# Patient Record
Sex: FEMALE | Race: WHITE | NOT HISPANIC OR LATINO | Employment: UNEMPLOYED | URBAN - METROPOLITAN AREA
[De-identification: names, ages, dates, MRNs, and addresses within clinical notes are randomized per-mention and may not be internally consistent; named-entity substitution may affect disease eponyms.]

---

## 2019-06-05 ENCOUNTER — OFFICE VISIT (OUTPATIENT)
Dept: FAMILY MEDICINE | Facility: CLINIC | Age: 24
End: 2019-06-05
Payer: COMMERCIAL

## 2019-06-05 VITALS
TEMPERATURE: 98.4 F | HEIGHT: 67 IN | DIASTOLIC BLOOD PRESSURE: 76 MMHG | SYSTOLIC BLOOD PRESSURE: 126 MMHG | BODY MASS INDEX: 26.18 KG/M2 | RESPIRATION RATE: 16 BRPM | WEIGHT: 166.8 LBS | HEART RATE: 76 BPM | OXYGEN SATURATION: 100 %

## 2019-06-05 DIAGNOSIS — Z11.1 SCREENING FOR TUBERCULOSIS: ICD-10-CM

## 2019-06-05 DIAGNOSIS — Z12.4 SCREENING FOR CERVICAL CANCER: ICD-10-CM

## 2019-06-05 DIAGNOSIS — Z72.0 TOBACCO ABUSE: ICD-10-CM

## 2019-06-05 DIAGNOSIS — Z11.3 SCREEN FOR STD (SEXUALLY TRANSMITTED DISEASE): ICD-10-CM

## 2019-06-05 DIAGNOSIS — Z11.59 ENCOUNTER FOR SCREENING FOR OTHER VIRAL DISEASES: ICD-10-CM

## 2019-06-05 DIAGNOSIS — Z00.00 ROUTINE GENERAL MEDICAL EXAMINATION AT A HEALTH CARE FACILITY: Primary | ICD-10-CM

## 2019-06-05 DIAGNOSIS — A54.9 GONORRHEA: ICD-10-CM

## 2019-06-05 DIAGNOSIS — F31.9 BIPOLAR DEPRESSION (H): ICD-10-CM

## 2019-06-05 DIAGNOSIS — A74.9 CHLAMYDIA: ICD-10-CM

## 2019-06-05 PROCEDURE — 87389 HIV-1 AG W/HIV-1&-2 AB AG IA: CPT | Performed by: FAMILY MEDICINE

## 2019-06-05 PROCEDURE — 86803 HEPATITIS C AB TEST: CPT | Performed by: FAMILY MEDICINE

## 2019-06-05 PROCEDURE — 87591 N.GONORRHOEAE DNA AMP PROB: CPT | Performed by: FAMILY MEDICINE

## 2019-06-05 PROCEDURE — 87491 CHLMYD TRACH DNA AMP PROBE: CPT | Performed by: FAMILY MEDICINE

## 2019-06-05 PROCEDURE — 86708 HEPATITIS A ANTIBODY: CPT | Performed by: FAMILY MEDICINE

## 2019-06-05 PROCEDURE — 86780 TREPONEMA PALLIDUM: CPT | Performed by: FAMILY MEDICINE

## 2019-06-05 PROCEDURE — 99385 PREV VISIT NEW AGE 18-39: CPT | Performed by: FAMILY MEDICINE

## 2019-06-05 PROCEDURE — 36415 COLL VENOUS BLD VENIPUNCTURE: CPT | Performed by: FAMILY MEDICINE

## 2019-06-05 PROCEDURE — 86481 TB AG RESPONSE T-CELL SUSP: CPT | Performed by: FAMILY MEDICINE

## 2019-06-05 PROCEDURE — 86709 HEPATITIS A IGM ANTIBODY: CPT | Performed by: FAMILY MEDICINE

## 2019-06-05 PROCEDURE — 87340 HEPATITIS B SURFACE AG IA: CPT | Performed by: FAMILY MEDICINE

## 2019-06-05 RX ORDER — BREXPIPRAZOLE 1 MG/1
TABLET ORAL
Refills: 1 | COMMUNITY
Start: 2019-04-24 | End: 2021-01-18

## 2019-06-05 RX ORDER — NICOTINE 21 MG/24HR
1 PATCH, TRANSDERMAL 24 HOURS TRANSDERMAL EVERY 24 HOURS
Qty: 30 PATCH | Refills: 1 | Status: SHIPPED | OUTPATIENT
Start: 2019-06-05 | End: 2021-01-18

## 2019-06-05 ASSESSMENT — PATIENT HEALTH QUESTIONNAIRE - PHQ9
5. POOR APPETITE OR OVEREATING: MORE THAN HALF THE DAYS
SUM OF ALL RESPONSES TO PHQ QUESTIONS 1-9: 8

## 2019-06-05 ASSESSMENT — ANXIETY QUESTIONNAIRES
GAD7 TOTAL SCORE: 13
6. BECOMING EASILY ANNOYED OR IRRITABLE: MORE THAN HALF THE DAYS
7. FEELING AFRAID AS IF SOMETHING AWFUL MIGHT HAPPEN: SEVERAL DAYS
3. WORRYING TOO MUCH ABOUT DIFFERENT THINGS: MORE THAN HALF THE DAYS
IF YOU CHECKED OFF ANY PROBLEMS ON THIS QUESTIONNAIRE, HOW DIFFICULT HAVE THESE PROBLEMS MADE IT FOR YOU TO DO YOUR WORK, TAKE CARE OF THINGS AT HOME, OR GET ALONG WITH OTHER PEOPLE: VERY DIFFICULT
1. FEELING NERVOUS, ANXIOUS, OR ON EDGE: MORE THAN HALF THE DAYS
2. NOT BEING ABLE TO STOP OR CONTROL WORRYING: SEVERAL DAYS
5. BEING SO RESTLESS THAT IT IS HARD TO SIT STILL: NEARLY EVERY DAY

## 2019-06-05 ASSESSMENT — MIFFLIN-ST. JEOR: SCORE: 1529.23

## 2019-06-05 NOTE — NURSING NOTE
"Chief Complaint   Patient presents with     Physical     [facility] pt- pt states that she did have a pap smear done elsewhere, but she does want STD screenings and a pap anyways.     /76   Pulse 76   Temp 98.4  F (36.9  C) (Oral)   Resp 16   Ht 1.702 m (5' 7\")   Wt 75.7 kg (166 lb 12.8 oz)   SpO2 100%   BMI 26.12 kg/m   Estimated body mass index is 26.12 kg/m  as calculated from the following:    Height as of this encounter: 1.702 m (5' 7\").    Weight as of this encounter: 75.7 kg (166 lb 12.8 oz).  Medication Reconciliation: complete        Health Maintenance Due Pending Provider Review:  Pap Smear, PHQ9 and GAD7    Possibly completing today per provider review.    Cary Sosa MA  [facility]  [phone]  "

## 2019-06-05 NOTE — PATIENT INSTRUCTIONS
Close follow up with psychiatrist  Follow up at [facility] as needed    Preventive Health Recommendations  Female Ages 26 - 39  Yearly exam:   See your health care provider every year in order to    Review health changes.     Discuss preventive care.      Review your medicines if you your doctor has prescribed any.    Until age 30: Get a Pap test every three years (more often if you have had an abnormal result).    After age 30: Talk to your doctor about whether you should have a Pap test every 3 years or have a Pap test with HPV screening every 5 years.   You do not need a Pap test if your uterus was removed (hysterectomy) and you have not had cancer.  You should be tested each year for STDs (sexually transmitted diseases), if you're at risk.   Talk to your provider about how often to have your cholesterol checked.  If you are at risk for diabetes, you should have a diabetes test (fasting glucose).  Shots: Get a flu shot each year. Get a tetanus shot every 10 years.   Nutrition:     Eat at least 5 servings of fruits and vegetables each day.    Eat whole-grain bread, whole-wheat pasta and brown rice instead of white grains and rice.    Get adequate Calcium and Vitamin D.     Lifestyle    Exercise at least 150 minutes a week (30 minutes a day, 5 days of the week). This will help you control your weight and prevent disease.    Limit alcohol to one drink per day.    No smoking.     Wear sunscreen to prevent skin cancer.    See your dentist every six months for an exam and cleaning.
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
## 2019-06-06 ENCOUNTER — TELEPHONE (OUTPATIENT)
Dept: FAMILY MEDICINE | Facility: CLINIC | Age: 24
End: 2019-06-06

## 2019-06-06 DIAGNOSIS — A74.9 CHLAMYDIA: Primary | ICD-10-CM

## 2019-06-06 DIAGNOSIS — A54.9 GONORRHEA: ICD-10-CM

## 2019-06-06 LAB
C TRACH DNA SPEC QL NAA+PROBE: POSITIVE
HAV IGG SER QL IA: NONREACTIVE
HAV IGM SERPL QL IA: NONREACTIVE
HBV SURFACE AG SERPL QL IA: NONREACTIVE
HCV AB SERPL QL IA: NONREACTIVE
HIV 1+2 AB+HIV1 P24 AG SERPL QL IA: NONREACTIVE
N GONORRHOEA DNA SPEC QL NAA+PROBE: POSITIVE
SPECIMEN SOURCE: ABNORMAL
SPECIMEN SOURCE: ABNORMAL
T PALLIDUM AB SER QL: NONREACTIVE

## 2019-06-06 RX ORDER — AZITHROMYCIN 500 MG/1
2000 TABLET, FILM COATED ORAL ONCE
Qty: 4 TABLET | Refills: 0 | Status: SHIPPED | OUTPATIENT
Start: 2019-06-06 | End: 2019-06-06

## 2019-06-06 RX ORDER — AZITHROMYCIN 500 MG/1
1000 TABLET, FILM COATED ORAL ONCE
Qty: 2 TABLET | Refills: 0 | Status: SHIPPED | OUTPATIENT
Start: 2019-06-06 | End: 2019-06-06

## 2019-06-06 ASSESSMENT — ANXIETY QUESTIONNAIRES: GAD7 TOTAL SCORE: 13

## 2019-06-06 NOTE — TELEPHONE ENCOUNTER
Please call radha at [phone] & fax report to [phone] at [facility]-  to inform patient Alicia     Chlamydia & gonorrhea positive  Need treatment Zithromax 2 gm single dose treatment is sent  Should have nurse only ap to get ceftriaxone 250 mg 1m       All rest negative for -HEPATITIS B, B  C VIRUS   HIV, human immunodeficiency virus  and syphilis    Negative for TB screening as well.    Also start MD

## 2019-06-06 NOTE — TELEPHONE ENCOUNTER
Called [facility] and left message for pt to call us back   Need to discuss positive results with patient     Rx sent for 2g - treatment should be 1g only   Huddled with A.S  She reviewed this - 1g is correct  Cancelled Rx at [facility] Rx for 1g instead per verbal order from A.S    [facility] form started (treatment date needs to be added)  Bettye FARRELL RN

## 2019-06-07 LAB
GAMMA INTERFERON BACKGROUND BLD IA-ACNC: 0.03 IU/ML
M TB IFN-G BLD-IMP: NEGATIVE
M TB IFN-G CD4+ BCKGRND COR BLD-ACNC: >10 IU/ML
MITOGEN IGNF BCKGRD COR BLD-ACNC: 0.01 IU/ML
MITOGEN IGNF BCKGRD COR BLD-ACNC: 0.03 IU/ML

## 2019-06-10 ENCOUNTER — ALLIED HEALTH/NURSE VISIT (OUTPATIENT)
Dept: NURSING | Facility: CLINIC | Age: 24
End: 2019-06-10
Payer: COMMERCIAL

## 2019-06-10 ENCOUNTER — MYC MEDICAL ADVICE (OUTPATIENT)
Dept: FAMILY MEDICINE | Facility: CLINIC | Age: 24
End: 2019-06-10

## 2019-06-10 DIAGNOSIS — A54.9 GONORRHEA: Primary | ICD-10-CM

## 2019-06-10 PROCEDURE — 96372 THER/PROPH/DIAG INJ SC/IM: CPT

## 2019-06-10 PROCEDURE — 99207 ZZC NO CHARGE NURSE ONLY: CPT

## 2019-06-10 RX ORDER — CEFTRIAXONE SODIUM 250 MG
250 VIAL (EA) INJECTION ONCE
Status: COMPLETED | OUTPATIENT
Start: 2019-06-10 | End: 2019-06-10

## 2019-06-10 RX ADMIN — Medication 250 MG: at 16:00

## 2019-08-06 ENCOUNTER — TELEPHONE (OUTPATIENT)
Dept: FAMILY MEDICINE | Facility: CLINIC | Age: 24
End: 2019-08-06

## 2019-08-06 NOTE — TELEPHONE ENCOUNTER
Received form(s) from [facility] for patient investigation.  Placed form(s) in/on AS's desk.  Forms need to be signed and faxed to [phone]..    Call pt to verify form was sent: No  Copy needs to be sent for scanning after completion: Yes

## 2020-03-01 ENCOUNTER — HEALTH MAINTENANCE LETTER (OUTPATIENT)
Age: 25
End: 2020-03-01

## 2020-11-08 ENCOUNTER — HOSPITAL ENCOUNTER (EMERGENCY)
Facility: CLINIC | Age: 25
Discharge: HOME | End: 2020-11-08
Attending: EMERGENCY MEDICINE | Admitting: EMERGENCY MEDICINE
Payer: COMMERCIAL

## 2020-11-08 VITALS
RESPIRATION RATE: 20 BRPM | OXYGEN SATURATION: 99 % | DIASTOLIC BLOOD PRESSURE: 69 MMHG | SYSTOLIC BLOOD PRESSURE: 145 MMHG | TEMPERATURE: 97.8 F | HEART RATE: 118 BPM

## 2020-11-08 DIAGNOSIS — R45.1 AGITATION: ICD-10-CM

## 2020-11-08 DIAGNOSIS — F19.10 POLYSUBSTANCE ABUSE (H): ICD-10-CM

## 2020-11-08 LAB — ALCOHOL BREATH TEST: 0 (ref 0–0.01)

## 2020-11-08 PROCEDURE — 99283 EMERGENCY DEPT VISIT LOW MDM: CPT | Performed by: EMERGENCY MEDICINE

## 2020-11-08 PROCEDURE — 82075 ASSAY OF BREATH ETHANOL: CPT | Performed by: EMERGENCY MEDICINE

## 2020-11-08 ASSESSMENT — ENCOUNTER SYMPTOMS
RHINORRHEA: 0
SORE THROAT: 0
FEVER: 0
NAUSEA: 0
CHILLS: 0
VOMITING: 0
ABDOMINAL PAIN: 0
HALLUCINATIONS: 0
COUGH: 0
NERVOUS/ANXIOUS: 1
SHORTNESS OF BREATH: 0

## 2020-11-08 NOTE — ED AVS SNAPSHOT
[facility] Emergency Department  [address]  [place] [address]  Phone: [phone]  Fax: [phone]                                    Alicia Rangel   MRN: [id number]    Department: [facility] Emergency Department   Date of Visit: 11/8/2020           After Visit Summary Signature Page    I have received my discharge instructions, and my questions have been answered. I have discussed any challenges I see with this plan with the nurse or doctor.    ..........................................................................................................................................  Patient/Patient Representative Signature      ..........................................................................................................................................  Patient Representative Print Name and Relationship to Patient    ..................................................               ................................................  Date                                   Time    ..........................................................................................................................................  Reviewed by Signature/Title    ...................................................              ..............................................  Date                                               Time          22EPIC Rev 08/18

## 2020-11-09 ENCOUNTER — TRANSFERRED RECORDS (OUTPATIENT)
Dept: HEALTH INFORMATION MANAGEMENT | Facility: CLINIC | Age: 25
End: 2020-11-09

## 2020-11-09 ENCOUNTER — HOSPITAL ENCOUNTER (EMERGENCY)
Facility: CLINIC | Age: 25
Discharge: HOME | End: 2020-11-10
Attending: EMERGENCY MEDICINE | Admitting: EMERGENCY MEDICINE
Payer: COMMERCIAL

## 2020-11-09 DIAGNOSIS — F19.10 SUBSTANCE ABUSE (H): ICD-10-CM

## 2020-11-09 DIAGNOSIS — F11.10 HEROIN ABUSE (H): ICD-10-CM

## 2020-11-09 PROCEDURE — 99284 EMERGENCY DEPT VISIT MOD MDM: CPT | Performed by: EMERGENCY MEDICINE

## 2020-11-09 ASSESSMENT — ENCOUNTER SYMPTOMS
AGITATION: 1
DYSURIA: 0
HYPERACTIVE: 1

## 2020-11-09 NOTE — DISCHARGE INSTRUCTIONS
You have come to the emergency department in order to get into detox due to heroin, methamphetamines, and possible other opiate use.  We do not do detox from opiates on an inpatient basis.  There is an outpatient addiction clinic that can talk with you about opiate detox.   There are no units that do detox from methamphetamines - there are treatment centers that can help you with this.      Rule 25 Information -     To access chemical dependency treatment you must have an assessment done or updated within 30 days of starting any program. If you are intoxicated to may be required to detox at a detox facility before starting treatment.     [facility]: [phone]  [facility]: [phone]  [facility]: [phone]  [facility]: [phone]   [facility]: [phone]    The following facilities offer Rule 25 assessments -     [facility]  [phone]  [facility]   [address] [place] [address]    [facility] ([facility])  [phone]  [address] [place] [address]  *Walk in assessments also available M-F starting at 8 am.     [facility]  [phone]  [address] [facility] [address]      [facility]   [phone]  [facility]  [address] [facility] [address]    [facility] [phone]  [address] [facility] [address]    [facility]  [phone] - press 1  Multiple clinic locations    [facility]   [phone]  [address]  [facility] [address]    [facility]  [phone]  [address] [facility] [address]    [facility]  [phone]  [address] [facility] [address]    [facility] RULE 25 INFORMATION -   [place] - [phone]  [facility] [phone]  [place] - [phone]  [facility] [phone]  [facility] [phone]  [facility]  [phone]  [place] - [phone]  [place] - [phone]

## 2020-11-09 NOTE — ED PROVIDER NOTES
"    [facility]GENCY DEPARTMENT ([facility])     November 8, 2020  History     Chief Complaint   Patient presents with     Drug / Alcohol Assessment     HPI  Alicia Rangel is a 25 year old female with a PMH of bipolar depression, AGUSTIN, asthma, jackie, paranoia, psychosis, and polysubstance abuse who presents to the ED today seeking a drug/alcohol assessment.  Patient reports she has been using meth and heroin, with her last use being earlier today.  Patient wants to get into detox.  Patient reports she smokes \"a bowl\" of meth daily, and has been doing this for a year and a half.  She reports she has been smoking $10 worth of heroin daily since November 2018, with her last use being just before arrival.  Patient reports she used carfentanyl on Sunday morning, and her friends had to administer Narcan.  She states that she was breathing the whole time, and did not go to the hospital after.  She states she smokes marijuana occasionally, with her last use a couple weeks ago.  Patient reports that she is on lamotrigine for her mental health disorders.  Patient reports has been through treatment before, [place] year ago, then [facility] in February 2020.  Patient reports she was not sober at all after her last treatment.  Patient reports she was last sober 3 years ago for a short time.  Patient denies any alcohol use.  Patient reports she has a psychiatrist at [facility] in [place], and occasionally attends [facility].  Patient reports she is paranoid and states \"for good reason\" but will not state why.  Per mom, her father and mother support her and want her to get help.  Patient denies alcohol use, suicidal ideation, hallucinations, fever, chills, rhinorrhea, sore throat, cough, shortness of breath, chest pain, abdominal pain, nausea, vomiting, or any IV drug use.    Her mom has accompanied the patient here to the emergency department.  She states that she very much wants to support the " "patient as she attempts to get sober.  She states that this episode last weekend where she seemingly overdosed on car fentanyl was quite a wake-up call to her and this is why she is pressing the patient to get help now.    The patient is exceedingly irritable, and during the entire interview was trying to interact on her phone with her Facebook account.  She had to be told several times to put her phone down so she could participate in the interview.  On multiple occasions, she said \"can't you just look this up, why are you asking me all these questions?\"    I have reviewed the Medications, Allergies, Past Medical and Surgical History, and Social History in the i4[employer]ms system.  PAST MEDICAL HISTORY:   Past Medical History:   Diagnosis Date     Generalized anxiety disorder      Polysubstance dependence (H)        PAST SURGICAL HISTORY:   Past Surgical History:   Procedure Laterality Date     GYN SURGERY         Past medical history, past surgical history, medications, and allergies were reviewed with the patient. Additional pertinent items: None    FAMILY HISTORY:   Family History   Problem Relation Age of Onset     Eye Disorder Mother      Alcohol/Drug Father      Hypertension Father      Cancer Paternal Grandfather        SOCIAL HISTORY:   Social History     Tobacco Use     Smoking status: Current Every Day Smoker     Packs/day: 3.00     Types: Cigarettes     Smokeless tobacco: Never Used   Substance Use Topics     Alcohol use: Yes     Comment: Last used Wednesday     Social history was reviewed with the patient. Additional pertinent items: None             Allergies   Allergen Reactions     Nkda [No Known Drug Allergies]         Review of Systems   Constitutional: Negative for chills and fever.        (Seeking detox from methamphetamines and opiates)   HENT: Negative for rhinorrhea and sore throat.    Respiratory: Negative for cough and shortness of breath.    Cardiovascular: Negative for chest pain. "   Gastrointestinal: Negative for abdominal pain, nausea and vomiting.   Genitourinary: Negative for dysuria.   Psychiatric/Behavioral: Positive for agitation and behavioral problems. Negative for hallucinations and suicidal ideas. The patient is nervous/anxious (Paranoid) and is hyperactive.    All other systems reviewed and are negative.    A complete review of systems was performed with pertinent positives and negatives noted in the HPI, and all other systems negative.    Physical Exam   BP: (!) 145/69  Pulse: 118  Temp: 97.8  F (36.6  C)  Resp: 20  SpO2: 99 %      Physical Exam  Vitals signs and nursing note reviewed.   Constitutional:       General: She is not in acute distress.     Appearance: She is well-developed. She is not diaphoretic.      Comments: Thin adult female, exceedingly distracted by her phone and attempting to interact with her Facebook account, very irritable, clearly not interested in responding to questions regarding drug use   HENT:      Head: Normocephalic and atraumatic.   Eyes:      Conjunctiva/sclera: Conjunctivae normal.      Pupils: Pupils are equal, round, and reactive to light.   Cardiovascular:      Rate and Rhythm: Normal rate and regular rhythm.      Heart sounds: Normal heart sounds.   Pulmonary:      Effort: Pulmonary effort is normal. No respiratory distress.      Breath sounds: Normal breath sounds. No wheezing or rales.   Abdominal:      General: Bowel sounds are normal. There is no distension.      Palpations: Abdomen is soft.      Tenderness: There is no abdominal tenderness.   Skin:     General: Skin is warm and dry.   Neurological:      Mental Status: She is alert.   Psychiatric:      Comments: Distracted, irritable, pressured speech, frequently interrupting.  Denies SI/HI, does not appear to be responding to internal stimuli. Insight very impaired.  Labile mood.  Unpredictable behavior, very suddenly escalated and became violent, agitated, throwing things in the room.  "        ED Course   7:24 PM  The patient was seen and examined by Vane Swain MD in Room ED15.        Procedures                           Results for orders placed or performed during the hospital encounter of 11/08/20 (from the past 24 hour(s))   Alcohol breath test POCT   Result Value Ref Range    Alcohol Breath Test 0.00 0.00 - 0.01     Medications - No data to display          Assessments & Plan (with Medical Decision Making)   Patient presents to the emergency department today seeking detox.  She is currently using methamphetamines and opiates.  She is not drinking alcohol per her report and benzodiazepines do not appear to be something that she tends to use.  She is very obviously exceedingly irritable and not overly interested in cooperating with an assessment.  I do not see any mental health reason to admit her.    I did attempt to explain to her that we cannot admit her to a detox unit for her meth use.  Before I could even finish the sentence, patient very suddenly escalated, stood up from her chair and began screaming out of control.  Her mother, who was with her here in the emergency department, immediately did stand up and tried to reason with her.  She repeatedly attempted to call the patient down and this was unsuccessful.  The patient did make threats and told me to \"get the fuck out of here\" repeatedly.  She was not redirectable even to have a very basic discussion of what our recommendations would be about how to go about getting help as an outpatient.  She began throwing things at her mother.  Even with a show of force by security, she did not calm down.    At this point, she is asking/demanding discharge.  For patient and staff safety, we will discharge her.  I attempted to give CD resources, and I was going to speak with her mother in a separate room so we could discuss potential outpatient resources for her.  However the patient came back in the department, screaming at the top of her " lungs and demanded that her mother come out right now so she could drive her home.  Her mother complied.    Patient was discharged.    I have reviewed the nursing notes.    I have reviewed the findings, diagnosis, plan and need for follow up with the patient.    Discharge Medication List as of 11/8/2020  7:43 PM          Final diagnoses:   Polysubstance abuse (H)   Agitation   I, Zachary Marx, am serving as a trained medical scribe to document services personally performed by Vane Swain MD, based on the provider's statements to me.     I, Vane Swain MD, was physically present and have reviewed and verified the accuracy of this note documented by Zachary Marx.      11/8/2020   [facility] EMERGENCY DEPARTMENT     Vane Swain MD  11/09/20 0105

## 2020-11-09 NOTE — ED TRIAGE NOTES
Pt states she has been using alcohol, meth, and herion and last used earlier today.  Pt wants to get into detox.

## 2020-11-09 NOTE — ED NOTES
Pt yelling and swearing a MD and staff in pt room and in hallway. Pt yelling for shoes and demanding to leave. Pt's mom given Rule 25 and detox center information. Pt left without being discharged.

## 2020-11-09 NOTE — ED AVS SNAPSHOT
[facility] Emergency Department  [address]  [place] [address]  Phone: [phone]  Fax: [phone]                                    Alicia Rangel   MRN: [id number]    Department: [facility]   Date of Visit: 11/9/2020           After Visit Summary Signature Page    I have received my discharge instructions, and my questions have been answered. I have discussed any challenges I see with this plan with the nurse or doctor.    ..........................................................................................................................................  Patient/Patient Representative Signature      ..........................................................................................................................................  Patient Representative Print Name and Relationship to Patient    ..................................................               ................................................  Date                                   Time    ..........................................................................................................................................  Reviewed by Signature/Title    ...................................................              ..............................................  Date                                               Time          22EPIC Rev 08/18

## 2020-11-10 VITALS
TEMPERATURE: 99 F | SYSTOLIC BLOOD PRESSURE: 114 MMHG | OXYGEN SATURATION: 100 % | RESPIRATION RATE: 16 BRPM | DIASTOLIC BLOOD PRESSURE: 78 MMHG | HEART RATE: 54 BPM

## 2020-11-10 PROCEDURE — 250N000011 HC RX IP 250 OP 636: Performed by: EMERGENCY MEDICINE

## 2020-11-10 PROCEDURE — 250N000013 HC RX MED GY IP 250 OP 250 PS 637: Mod: GY | Performed by: EMERGENCY MEDICINE

## 2020-11-10 PROCEDURE — 96372 THER/PROPH/DIAG INJ SC/IM: CPT | Performed by: EMERGENCY MEDICINE

## 2020-11-10 RX ORDER — LAMOTRIGINE 25 MG/1
25 TABLET ORAL ONCE
Status: COMPLETED | OUTPATIENT
Start: 2020-11-10 | End: 2020-11-10

## 2020-11-10 RX ORDER — OLANZAPINE 10 MG/2ML
10 INJECTION, POWDER, FOR SOLUTION INTRAMUSCULAR ONCE
Status: COMPLETED | OUTPATIENT
Start: 2020-11-10 | End: 2020-11-10

## 2020-11-10 RX ORDER — BUPRENORPHINE AND NALOXONE 2; .5 MG/1; MG/1
1 FILM, SOLUBLE BUCCAL; SUBLINGUAL DAILY
Status: DISCONTINUED | OUTPATIENT
Start: 2020-11-10 | End: 2020-11-10 | Stop reason: HOSPADM

## 2020-11-10 RX ORDER — BUPRENORPHINE AND NALOXONE 2; .5 MG/1; MG/1
1 FILM, SOLUBLE BUCCAL; SUBLINGUAL ONCE
Status: DISCONTINUED | OUTPATIENT
Start: 2020-11-10 | End: 2020-11-10 | Stop reason: HOSPADM

## 2020-11-10 RX ADMIN — LAMOTRIGINE 25 MG: 25 TABLET ORAL at 12:57

## 2020-11-10 RX ADMIN — OLANZAPINE 10 MG: 10 INJECTION, POWDER, LYOPHILIZED, FOR SOLUTION INTRAMUSCULAR at 11:16

## 2020-11-10 RX ADMIN — BUPRENORPHINE AND NALOXONE 1 FILM: 2; .5 FILM BUCCAL; SUBLINGUAL at 14:35

## 2020-11-10 ASSESSMENT — ENCOUNTER SYMPTOMS
CONFUSION: 1
ABDOMINAL PAIN: 0
AGITATION: 1
SHORTNESS OF BREATH: 0
FEVER: 0

## 2020-11-10 NOTE — ED NOTES
Pt signed out to me by Dr. Quigley at 7 am      Situation:   The 25-year-old female who was brought to the emergency department overnight due to aggressive behavior.  Patient has a history of polysubstance abuse and was given droperidol in route by EMS.    Plan: Plan is to watch the patient in the ER until she is more awake and alert and able to give a full history.  There is a concern that patient is under the influence of multiple polysubstances this likely caused her aggressive behavior.  Plan will be that patient will likely be discharged in the morning.    Shift Report:    The first reevaluated the patient around 8:30 in the morning.  She is sleeping comfortably in bed and is calm.  Patient will be reevaluated later in the day when she is more awake.    4pm: When patient woke up she became more agitated and was having withdrawal-like symptoms.  She felt very restless and anxious.  She is unable to sit still.  She felt like she needed to be admitted to detox.  I did call and discuss her case with mental health detox.  They said that they no longer do detox for methamphetamines and opioids cannot offer her any inpatient treatment.  Patient was given some Zyprexa to see that would help with her agitation and some Lamictal per her request.  This helped somewhat with her symptoms but patient was still feeling very restless and felt like her symptoms were likely due to heroin withdrawal.  I called and spoke to the [facility] physician on-call.  She recommended that I give her a dose of Suboxone and so she was given one half a fill film of 2 mg of Suboxone.  The Suboxone did help her feel better and her symptoms improved.  The physician had recommended that I discharge her with 1 dose of Suboxone for her to take tonight and then have her follow-up in clinic tomorrow.  Patient was given 1 additional 2 mg of Suboxone here in the ER and will be discharged home with instructions to follow-up tomorrow in [facility] clinic  for further care.    Signed:  Mary Cleveland MD  November 10, 2020 at 4:01 PM       Mary Cleveland MD  11/10/20 2948

## 2020-11-10 NOTE — ED NOTES
Patient up to bathroom at this time. Patient ambulates with stable gait, but needs encouragement to open eyes when ambulating. Patient tearful about having to void. Assisted to bathroom, patient able to void. Patient able to answer some questions, but still not answering Triage questions. Poor insight about the events that happened PTA. Will continue to monitor.

## 2020-11-10 NOTE — ED NOTES
Pt was picked up from [address] because staff couldn't control her.  She got 5mg of Droperidol IV at about 2240.

## 2020-11-10 NOTE — ED NOTES
Patient was brought in by EMS from [address] [place] [address] for aggressive behavior. Patient was given sedatives PTA. Patient answering questions intermittently. Patient sleepy at this time. Rouses to verbal stimuli. Denies being suicidal.

## 2020-11-10 NOTE — ED PROVIDER NOTES
"ED Provider Note  [facility]      History     Chief Complaint   Patient presents with     Aggressive Behavior     Pt was picked up from [address] because staff couldn't control her.  She got 5mg of Droperidol IV at about 2240.      The history is provided by the patient, medical records and the EMS personnel.     Alicia Rangel is a 25 year old female with a PMH of bipolar depression, AGUSTIN, asthma, jackie, paranoia, psychosis, and polysubstance abuse who presents to the ED today via EMS from [address] for aggressive behavior. Per EMS staff could not control her. She was given 5mg Droperidol at 2240. When asked what she used tonight the patient states she used \"catfish\".     Per chart review the patient was seen here in the ED on 11/8 seeking a drug/alcohol assessment and seeking detox. She reported using a bowl of meth daily for 2 years in addition to occasional marijuana use. She also reported using carfentanyl on Sunday morning and her friends had to administer Narcan. She is on lamotrigine for mental health disorders. She was in treatment 1 year ago in [place] and again at [facility] in February 2020, but was not sober at all after. The patient escalated after being told she could not be admitted to detox for meth. She was discharged for patient and staff safety.   Past Medical History:   Diagnosis Date     Generalized anxiety disorder      Polysubstance dependence (H)           Review of Systems   Constitutional: Negative for fever.   Respiratory: Negative for shortness of breath.    Cardiovascular: Negative for chest pain.   Gastrointestinal: Negative for abdominal pain.   Psychiatric/Behavioral: Positive for agitation and confusion. Negative for self-injury and suicidal ideas.   All other systems reviewed and are negative.        Physical Exam   BP: (pt refused)  Pulse: 86  Temp: (pt refused temp)  Resp: 12  SpO2: 98 %  Physical Exam  Vitals signs and nursing note reviewed. "   Constitutional:       General: She is not in acute distress.     Appearance: She is well-developed.   HENT:      Head: Normocephalic.   Eyes:      Extraocular Movements: Extraocular movements intact.   Neck:      Musculoskeletal: Neck supple.   Pulmonary:      Effort: No respiratory distress.   Abdominal:      General: There is no distension.   Musculoskeletal:         General: No deformity.   Skin:     General: Skin is dry.   Neurological:      Mental Status: She is alert.      Comments: Sleeping but arousable, answer some questions.         ED Course      Procedures             No results found for any visits on 11/09/20.  Medications - No data to display     Assessments & Plan (with Medical Decision Making)   25-year-old female presents to us with a chief complaint of agitation.  She received droperidol prior to arrival and is sleepy upon evaluation.  She admits to using substances.  She is not currently violent or agitated.  This point she still seems somewhat disoriented.  Will sign her out to the day physician pending clearance of what ever substance she is on    I have reviewed the nursing notes. I have reviewed the findings, diagnosis, plan and need for follow up with the patient.    New Prescriptions    No medications on file       Final diagnoses:   Substance abuse (H)   I, Jennifer Houston, am serving as a trained medical scribe to document services personally performed by Jorden Quigley DO, based on the provider's statements to me.     I, Jorden Quigley DO, was physically present and have reviewed and verified the accuracy of this note documented by Jennifer Houston.      --  Jorden Quigley DO  [facility] EMERGENCY DEPARTMENT  11/9/2020     Jorden Quigley DO  11/10/20 0659

## 2020-11-11 ENCOUNTER — DOCUMENTATION ONLY (OUTPATIENT)
Dept: BEHAVIORAL HEALTH | Facility: CLINIC | Age: 25
End: 2020-11-11

## 2020-11-11 NOTE — PROGRESS NOTES
[facility] was a [facility] show to [facility]11[facility]. Routing to   to contact patient to reschedule.    [place] [address] [facility], [address]  [place], [place], [address]  [phone]    Open Mon, Wed, Fridays  8:30am-4:30pm  Walk in hours: 9am-3pm    Obdulia Persaud RN on 11/11/2020 at 5:26 PM

## 2020-11-12 ENCOUNTER — HOSPITAL ENCOUNTER (EMERGENCY)
Facility: CLINIC | Age: 25
Discharge: HOME | End: 2020-11-12
Attending: EMERGENCY MEDICINE | Admitting: EMERGENCY MEDICINE
Payer: COMMERCIAL

## 2020-11-12 VITALS
DIASTOLIC BLOOD PRESSURE: 70 MMHG | TEMPERATURE: 98 F | RESPIRATION RATE: 12 BRPM | HEART RATE: 80 BPM | OXYGEN SATURATION: 98 % | SYSTOLIC BLOOD PRESSURE: 120 MMHG

## 2020-11-12 DIAGNOSIS — F19.10 POLYSUBSTANCE ABUSE (H): ICD-10-CM

## 2020-11-12 LAB
AMPHETAMINES UR QL SCN: POSITIVE
BARBITURATES UR QL: NEGATIVE
BENZODIAZ UR QL: NEGATIVE
CANNABINOIDS UR QL SCN: NEGATIVE
COCAINE UR QL: NEGATIVE
ETHANOL UR QL SCN: NEGATIVE
HCG UR QL: NEGATIVE
OPIATES UR QL SCN: POSITIVE

## 2020-11-12 PROCEDURE — 96360 HYDRATION IV INFUSION INIT: CPT | Performed by: EMERGENCY MEDICINE

## 2020-11-12 PROCEDURE — 99283 EMERGENCY DEPT VISIT LOW MDM: CPT | Performed by: EMERGENCY MEDICINE

## 2020-11-12 PROCEDURE — 96361 HYDRATE IV INFUSION ADD-ON: CPT | Performed by: EMERGENCY MEDICINE

## 2020-11-12 PROCEDURE — 250N000013 HC RX MED GY IP 250 OP 250 PS 637: Performed by: EMERGENCY MEDICINE

## 2020-11-12 PROCEDURE — 81025 URINE PREGNANCY TEST: CPT | Performed by: EMERGENCY MEDICINE

## 2020-11-12 PROCEDURE — 99283 EMERGENCY DEPT VISIT LOW MDM: CPT | Mod: 25 | Performed by: EMERGENCY MEDICINE

## 2020-11-12 PROCEDURE — 80307 DRUG TEST PRSMV CHEM ANLYZR: CPT | Performed by: EMERGENCY MEDICINE

## 2020-11-12 PROCEDURE — 258N000003 HC RX IP 258 OP 636: Performed by: EMERGENCY MEDICINE

## 2020-11-12 PROCEDURE — 80320 DRUG SCREEN QUANTALCOHOLS: CPT | Performed by: EMERGENCY MEDICINE

## 2020-11-12 RX ORDER — SODIUM CHLORIDE 9 MG/ML
INJECTION, SOLUTION INTRAVENOUS CONTINUOUS
Status: DISCONTINUED | OUTPATIENT
Start: 2020-11-12 | End: 2020-11-12 | Stop reason: HOSPADM

## 2020-11-12 RX ORDER — HYDROXYZINE HYDROCHLORIDE 50 MG/1
50 TABLET, FILM COATED ORAL ONCE
Status: COMPLETED | OUTPATIENT
Start: 2020-11-12 | End: 2020-11-12

## 2020-11-12 RX ADMIN — HYDROXYZINE HYDROCHLORIDE 50 MG: 50 TABLET, FILM COATED ORAL at 02:26

## 2020-11-12 RX ADMIN — SODIUM CHLORIDE 1000 ML: 9 INJECTION, SOLUTION INTRAVENOUS at 01:44

## 2020-11-12 ASSESSMENT — ENCOUNTER SYMPTOMS
FEVER: 0
ABDOMINAL PAIN: 0
SHORTNESS OF BREATH: 0

## 2020-11-12 NOTE — ED NOTES
Call received from camera room that pt fell in room. Pt up to BR. Asked Pt about fall. Pt denies fall or injury.

## 2020-11-12 NOTE — ED PROVIDER NOTES
"ED Provider Note  [facility]      History     Chief Complaint   Patient presents with     Addiction Problem     Pt here for detox from heroin and meth     The history is provided by the patient.     Alicia Rangel is a 25 year old female with a medical history significant for bipolar depression, AGUSTIN, asthma, jackie, paranoia, psychosis and polysubstance abuse who presents to the Emergency Department seeking detox from heroin and methamphetamine.  The patient reports she is supposed to enter [facility], but she needs to detox first.  The patient reports she was at [address], but she left because they were \"sabotaging me\".  The patient has done a Rule 25 assessment.  The patient reports that her last use was a couple of days ago.  The patient is currently homeless and does not want to be on the streets tonight.  The patient reports that her mother can pick her up in the morning to take her to the treatment center.    Past Medical History  Past Medical History:   Diagnosis Date     Generalized anxiety disorder      Polysubstance dependence (H)      Past Surgical History:   Procedure Laterality Date     GYN SURGERY            ARIPiprazole (ABILIFY) 20 MG tablet       diphenhydrAMINE (BENADRYL) 25 MG capsule       divalproex (DEPAKOTE ER) 500 MG 24 hr tablet       hydrOXYzine (ATARAX) 50 MG tablet       levonorgestrel-ethinyl estradiol (AVIANE,ALESSE,LESSINA) 0.1-20 MG-MCG per tablet       nicotine (NICODERM CQ) 21 MG/24HR 24 hr patch       propranolol (INDERAL) 20 MG tablet       REXULTI 1 MG tablet       SUMAtriptan (IMITREX) 50 MG tablet      Allergies   Allergen Reactions     Nkda [No Known Drug Allergies]      Family History  Family History   Problem Relation Age of Onset     Eye Disorder Mother      Alcohol/Drug Father      Hypertension Father      Cancer Paternal Grandfather      Social History   Social History     Tobacco Use     Smoking status: Current Every Day Smoker     " Packs/day: 3.00     Types: Cigarettes     Smokeless tobacco: Never Used   Substance Use Topics     Alcohol use: Yes     Comment: Last used Wednesday     Drug use: Yes     Types: Marijuana, Cocaine, Methamphetamines, Opiates     Comment: Relapsed on Cocaine this past week      Past medical history, past surgical history, medications, allergies, family history, and social history were reviewed with the patient. No additional pertinent items.       Review of Systems   Constitutional: Negative for fever.   Respiratory: Negative for shortness of breath.    Cardiovascular: Negative for chest pain.   Gastrointestinal: Negative for abdominal pain.   All other systems reviewed and are negative.      Physical Exam   BP: (!) 80/48  Pulse: 88  Temp: 98  F (36.7  C)  Resp: 12  SpO2: 98 %  Physical Exam  Constitutional:       General: She is not in acute distress.     Appearance: She is not diaphoretic.   HENT:      Head: Normocephalic.      Mouth/Throat:      Pharynx: No oropharyngeal exudate.   Eyes:      Extraocular Movements: Extraocular movements intact.   Neck:      Musculoskeletal: Neck supple.   Cardiovascular:      Rate and Rhythm: Normal rate and regular rhythm.      Heart sounds: Normal heart sounds.   Pulmonary:      Effort: No respiratory distress.      Breath sounds: Normal breath sounds.   Abdominal:      General: There is no distension.      Palpations: Abdomen is soft.      Tenderness: There is no abdominal tenderness.   Musculoskeletal:         General: No deformity.   Skin:     General: Skin is warm and dry.   Neurological:      Mental Status: She is alert.      Comments: alert   Psychiatric:         Behavior: Behavior normal.         ED Course      Procedures     1:12 AM  The patient was seen and examined by Jorden Quigley DO in Room ED16B.           Results for orders placed or performed during the hospital encounter of 11/12/20   Drug abuse screen 6 urine (tox)     Status: Abnormal   Result Value Ref  Range    Amphetamine Qual Urine Positive (A) NEG^Negative    Barbiturates Qual Urine Negative NEG^Negative    Benzodiazepine Qual Urine Negative NEG^Negative    Cannabinoids Qual Urine Negative NEG^Negative    Cocaine Qual Urine Negative NEG^Negative    Ethanol Qual Urine Negative NEG^Negative    Opiates Qualitative Urine Positive (A) NEG^Negative   HCG qualitative urine     Status: None   Result Value Ref Range    HCG Qual Urine Negative NEG^Negative          Results for orders placed or performed during the hospital encounter of 11/12/20   Drug abuse screen 6 urine (tox)     Status: Abnormal   Result Value Ref Range    Amphetamine Qual Urine Positive (A) NEG^Negative    Barbiturates Qual Urine Negative NEG^Negative    Benzodiazepine Qual Urine Negative NEG^Negative    Cannabinoids Qual Urine Negative NEG^Negative    Cocaine Qual Urine Negative NEG^Negative    Ethanol Qual Urine Negative NEG^Negative    Opiates Qualitative Urine Positive (A) NEG^Negative   HCG qualitative urine     Status: None   Result Value Ref Range    HCG Qual Urine Negative NEG^Negative     Medications   0.9% sodium chloride BOLUS (1,000 mLs Intravenous New Bag 11/12/20 0144)     Followed by   sodium chloride 0.9% infusion (has no administration in time range)   hydrOXYzine (ATARAX) tablet 50 mg (has no administration in time range)        Assessments & Plan (with Medical Decision Making)   25-year-old female presents to us with a chief complaint of requesting detox for methamphetamine and other substances.  Patient's initial blood pressure seemed to be low.  Recheck was normal but the patient was given some fluids as she did report being on methamphetamine recently.  She does have a intake for chemical dependency treatment tomorrow.  She is not actively suicidal or homicidal.  She was able to eat a meal.  Patient was observed in the emergency department overnight and was discharged in the a.m. to go to her treatment facility.    I have  reviewed the nursing notes. I have reviewed the findings, diagnosis, plan and need for follow up with the patient.    New Prescriptions    No medications on file       Final diagnoses:   Polysubstance abuse (H)       --  I, Moo Benavides, am serving as a trained medical scribe to document services personally performed by Jorden Quigley DO, based on the provider's statements to me.     I, Jorden Quigley DO, was physically present and have reviewed and verified the accuracy of this note documented by Moo Benavides.    Jorden Quigley DO  [facility] EMERGENCY DEPARTMENT  11/12/2020     Jorden Quigley DO  11/12/20 0218

## 2020-11-12 NOTE — ED AVS SNAPSHOT
[facility] Emergency Department  [address]  [place] [address]  Phone: [phone]  Fax: [phone]                                    Alicia Rangel   MRN: [id number]    Department: [facility] Department   Date of Visit: 11/12/2020           After Visit Summary Signature Page    I have received my discharge instructions, and my questions have been answered. I have discussed any challenges I see with this plan with the nurse or doctor.    ..........................................................................................................................................  Patient/Patient Representative Signature      ..........................................................................................................................................  Patient Representative Print Name and Relationship to Patient    ..................................................               ................................................  Date                                   Time    ..........................................................................................................................................  Reviewed by Signature/Title    ...................................................              ..............................................  Date                                               Time          22EPIC Rev 08/18

## 2020-11-12 NOTE — ED NOTES
Pt restless in hw talking to another agitated pt. Pt moved to room, pacing, eating food provided by staff.

## 2020-11-12 NOTE — ED NOTES
"Pt requested to use phone when discharging. Writer provided pt with hospital phone. Writer heard pt yelling on phone in room. Pt then came out of room and requested to be provided toothpaste and toothbrush so she could brush teeth. Writer provided pt with toothpaste and toothbrush but informed them they could not use bathroom inside emergency department because they were discharged. Pt began to yell and become aggressive while yelling \"talk to my mom!\" and trying to hand writer and nearby nurse the phone. Writer declined speaking to the person on the phone. Pt then tried to access locked bathroom and became angry when she could not. Pt was then informed by security that it was time to leave. Pt ran down hallway screaming and left personal bag. Security brought personal bag to patient in the lobby.   "

## 2020-12-14 ENCOUNTER — HEALTH MAINTENANCE LETTER (OUTPATIENT)
Age: 25
End: 2020-12-14

## 2021-01-17 ENCOUNTER — HOSPITAL ENCOUNTER (EMERGENCY)
Facility: CLINIC | Age: 26
Discharge: PSYCHIATRIC OR SUBSTANCE USE FACILITY | End: 2021-01-18
Attending: EMERGENCY MEDICINE | Admitting: PSYCHIATRY & NEUROLOGY
Payer: COMMERCIAL

## 2021-01-17 DIAGNOSIS — F19.10 POLYSUBSTANCE ABUSE (H): ICD-10-CM

## 2021-01-17 DIAGNOSIS — R46.89 AGGRESSION: ICD-10-CM

## 2021-01-17 DIAGNOSIS — R45.1 AGITATION: ICD-10-CM

## 2021-01-17 LAB
ALBUMIN SERPL-MCNC: 3.9 G/DL (ref 3.4–5)
ALP SERPL-CCNC: 60 U/L (ref 40–150)
ALT SERPL W P-5'-P-CCNC: 24 U/L (ref 0–50)
AMPHETAMINES UR QL SCN: POSITIVE
ANION GAP SERPL CALCULATED.3IONS-SCNC: 4 MMOL/L (ref 3–14)
APAP SERPL-MCNC: <2 MG/L (ref 10–20)
AST SERPL W P-5'-P-CCNC: 18 U/L (ref 0–45)
B-HCG FREE SERPL-ACNC: <5 IU/L
BARBITURATES UR QL: NEGATIVE
BASOPHILS # BLD AUTO: 0.1 10E9/L (ref 0–0.2)
BASOPHILS NFR BLD AUTO: 0.8 %
BENZODIAZ UR QL: NEGATIVE
BILIRUB SERPL-MCNC: 0.2 MG/DL (ref 0.2–1.3)
BUN SERPL-MCNC: 15 MG/DL (ref 7–30)
CALCIUM SERPL-MCNC: 9.1 MG/DL (ref 8.5–10.1)
CANNABINOIDS UR QL SCN: NEGATIVE
CHLORIDE SERPL-SCNC: 108 MMOL/L (ref 94–109)
CO2 SERPL-SCNC: 27 MMOL/L (ref 20–32)
COCAINE UR QL: NEGATIVE
CREAT SERPL-MCNC: 0.95 MG/DL (ref 0.52–1.04)
DIFFERENTIAL METHOD BLD: NORMAL
EOSINOPHIL # BLD AUTO: 0.6 10E9/L (ref 0–0.7)
EOSINOPHIL NFR BLD AUTO: 5.6 %
ERYTHROCYTE [DISTWIDTH] IN BLOOD BY AUTOMATED COUNT: 13.1 % (ref 10–15)
ETHANOL SERPL-MCNC: <0.01 G/DL
GFR SERPL CREATININE-BSD FRML MDRD: 82 ML/MIN/{1.73_M2}
GLUCOSE SERPL-MCNC: 63 MG/DL (ref 70–99)
HCT VFR BLD AUTO: 39 % (ref 35–47)
HGB BLD-MCNC: 12.7 G/DL (ref 11.7–15.7)
IMM GRANULOCYTES # BLD: 0 10E9/L (ref 0–0.4)
IMM GRANULOCYTES NFR BLD: 0.3 %
LABORATORY COMMENT REPORT: NORMAL
LYMPHOCYTES # BLD AUTO: 3.5 10E9/L (ref 0.8–5.3)
LYMPHOCYTES NFR BLD AUTO: 35.1 %
MCH RBC QN AUTO: 28.7 PG (ref 26.5–33)
MCHC RBC AUTO-ENTMCNC: 32.6 G/DL (ref 31.5–36.5)
MCV RBC AUTO: 88 FL (ref 78–100)
MONOCYTES # BLD AUTO: 0.4 10E9/L (ref 0–1.3)
MONOCYTES NFR BLD AUTO: 4.3 %
NEUTROPHILS # BLD AUTO: 5.3 10E9/L (ref 1.6–8.3)
NEUTROPHILS NFR BLD AUTO: 53.9 %
NRBC # BLD AUTO: 0 10*3/UL
NRBC BLD AUTO-RTO: 0 /100
OPIATES UR QL SCN: POSITIVE
PCP UR QL SCN: NEGATIVE
PLATELET # BLD AUTO: 313 10E9/L (ref 150–450)
POTASSIUM SERPL-SCNC: 4 MMOL/L (ref 3.4–5.3)
PROT SERPL-MCNC: 7.2 G/DL (ref 6.8–8.8)
RBC # BLD AUTO: 4.42 10E12/L (ref 3.8–5.2)
SALICYLATES SERPL-MCNC: <2 MG/DL
SARS-COV-2 RNA RESP QL NAA+PROBE: NEGATIVE
SODIUM SERPL-SCNC: 139 MMOL/L (ref 133–144)
SPECIMEN SOURCE: NORMAL
WBC # BLD AUTO: 9.9 10E9/L (ref 4–11)

## 2021-01-17 PROCEDURE — C9803 HOPD COVID-19 SPEC COLLECT: HCPCS

## 2021-01-17 PROCEDURE — 99292 CRITICAL CARE ADDL 30 MIN: CPT

## 2021-01-17 PROCEDURE — 85025 COMPLETE CBC W/AUTO DIFF WBC: CPT | Performed by: EMERGENCY MEDICINE

## 2021-01-17 PROCEDURE — 80307 DRUG TEST PRSMV CHEM ANLYZR: CPT | Performed by: EMERGENCY MEDICINE

## 2021-01-17 PROCEDURE — 250N000011 HC RX IP 250 OP 636

## 2021-01-17 PROCEDURE — 84702 CHORIONIC GONADOTROPIN TEST: CPT

## 2021-01-17 PROCEDURE — 258N000003 HC RX IP 258 OP 636: Performed by: EMERGENCY MEDICINE

## 2021-01-17 PROCEDURE — 80179 DRUG ASSAY SALICYLATE: CPT | Performed by: EMERGENCY MEDICINE

## 2021-01-17 PROCEDURE — 87635 SARS-COV-2 COVID-19 AMP PRB: CPT | Performed by: EMERGENCY MEDICINE

## 2021-01-17 PROCEDURE — 96360 HYDRATION IV INFUSION INIT: CPT

## 2021-01-17 PROCEDURE — 250N000013 HC RX MED GY IP 250 OP 250 PS 637: Performed by: EMERGENCY MEDICINE

## 2021-01-17 PROCEDURE — 99291 CRITICAL CARE FIRST HOUR: CPT | Mod: 25

## 2021-01-17 PROCEDURE — 80143 DRUG ASSAY ACETAMINOPHEN: CPT | Performed by: EMERGENCY MEDICINE

## 2021-01-17 PROCEDURE — 80053 COMPREHEN METABOLIC PANEL: CPT | Performed by: EMERGENCY MEDICINE

## 2021-01-17 PROCEDURE — 82077 ASSAY SPEC XCP UR&BREATH IA: CPT | Performed by: EMERGENCY MEDICINE

## 2021-01-17 RX ORDER — LANOLIN ALCOHOL/MO/W.PET/CERES
3 CREAM (GRAM) TOPICAL
Status: DISCONTINUED | OUTPATIENT
Start: 2021-01-17 | End: 2021-01-18 | Stop reason: HOSPADM

## 2021-01-17 RX ORDER — HYDROXYZINE HYDROCHLORIDE 25 MG/1
25 TABLET, FILM COATED ORAL EVERY 4 HOURS PRN
Status: DISCONTINUED | OUTPATIENT
Start: 2021-01-17 | End: 2021-01-18

## 2021-01-17 RX ORDER — OLANZAPINE 10 MG/2ML
INJECTION, POWDER, FOR SOLUTION INTRAMUSCULAR
Status: COMPLETED
Start: 2021-01-17 | End: 2021-01-17

## 2021-01-17 RX ORDER — OLANZAPINE 10 MG/2ML
10 INJECTION, POWDER, FOR SOLUTION INTRAMUSCULAR 2 TIMES DAILY PRN
Status: DISCONTINUED | OUTPATIENT
Start: 2021-01-17 | End: 2021-01-18

## 2021-01-17 RX ORDER — OLANZAPINE 10 MG/2ML
10 INJECTION, POWDER, FOR SOLUTION INTRAMUSCULAR ONCE
Status: COMPLETED | OUTPATIENT
Start: 2021-01-17 | End: 2021-01-17

## 2021-01-17 RX ORDER — ACETAMINOPHEN 325 MG/1
650 TABLET ORAL ONCE
Status: COMPLETED | OUTPATIENT
Start: 2021-01-17 | End: 2021-01-17

## 2021-01-17 RX ORDER — WATER 10 ML/10ML
INJECTION INTRAMUSCULAR; INTRAVENOUS; SUBCUTANEOUS
Status: DISCONTINUED
Start: 2021-01-17 | End: 2021-01-18 | Stop reason: HOSPADM

## 2021-01-17 RX ORDER — HYDROXYZINE HYDROCHLORIDE 25 MG/1
25 TABLET, FILM COATED ORAL
Status: DISCONTINUED | OUTPATIENT
Start: 2021-01-17 | End: 2021-01-18

## 2021-01-17 RX ADMIN — OLANZAPINE 10 MG: 10 INJECTION, POWDER, FOR SOLUTION INTRAMUSCULAR at 18:35

## 2021-01-17 RX ADMIN — SODIUM CHLORIDE 1000 ML: 9 INJECTION, SOLUTION INTRAVENOUS at 14:15

## 2021-01-17 RX ADMIN — ACETAMINOPHEN 650 MG: 325 TABLET, FILM COATED ORAL at 23:55

## 2021-01-17 RX ADMIN — OLANZAPINE 10 MG: 10 INJECTION, POWDER, LYOPHILIZED, FOR SOLUTION INTRAMUSCULAR at 18:35

## 2021-01-17 NOTE — ED PROVIDER NOTES
"  History   Chief Complaint:  Psychiatric Evaluation       The history is provided by the EMS personnel. The history is limited by the condition of the patient.      Alicia Rangel is a 25 year old female with history of polysubstance abuse, jackie, bipolar depression, and paranoia who presents via EMS after her friends reported that she was acting abnormally. Upon arrival, EMS notes that she was very angry and combative. She was stating that people were \"after her\" and \"wanted her dead\". They are not sure if she ingested any substances, but they state that she was foaming at the mouth prior to arrival. She also stated that she wanted to kill the paramedics. She arrives in the ED in 4 point restraints and chemically restrained using Ketamine and Benadryl.    Review of Systems   Unable to perform ROS: Other (Chemical restraints)     Allergies:  Diatrizoate    Medications:  Abilify  Benadryl  Depakote  Atarax  Inderal  Imitrex  Rexulti    Past Medical History:    AGUSTIN  Polysubstance abuse  Jackie  Paranoia, psychosis  Bipolar depression  Insomnia  Dysmenorrhea  Migraines     Past Surgical History:    Gyn surgery-     Family History:    Father: HTN, alcohol/drug use  Mother: eye disorder    Social History:  Arrives via EMS. Was chemically restrained upon arrival.    Physical Exam     Patient Vitals for the past 24 hrs:   BP Temp Temp src Pulse Resp SpO2   21 1815 108/64 -- -- 70 15 --   21 1800 114/72 -- -- 68 15 --   21 1730 110/48 -- -- 73 15 --   21 1700 (!) 121/91 -- -- 68 20 --   21 1630 126/79 -- -- 71 19 97 %   21 1600 111/55 -- -- 79 -- 96 %   21 1530 112/64 -- -- 82 15 97 %   21 1500 105/64 -- -- 79 14 97 %   21 1445 (!) 124/92 -- -- 80 13 97 %   21 1430 (!) 121/90 -- -- 83 8 98 %   21 1415 109/64 -- -- 76 14 98 %   21 1400 112/66 98.5  F (36.9  C) Rectal 74 13 98 %   21 1345 113/65 -- -- 82 16 97 %       Physical " Exam  Constitutional: Well developed, disheveled, sedated  Head: Atraumatic.   Mouth/Throat: Oropharynx is clear and moist.   Neck:  no stridor  Eyes: no scleral icterus  Cardiovascular: RRR, 2+ bilat radial pulses  Pulmonary/Chest: nml resp effort, Clear BS bilat  Abdominal: ND,  soft, no rebound or guarding   Ext: Warm, well perfused, no edema  Neurological: Sedated, symmetric facies, moves ext x4  Skin: Skin is warm and dry.   Nursing note and vitals reviewed.        Emergency Department Course   Laboratory:  Acetaminophen: sent  Salicylate level: sent  CBC: sent  CMP: sent  Alcohol ethyl: sent  ISTAT HCG quantitative pregnancy nursing POCT: <5.0    Asymptomatic COVID-19 virus (Coronavirus) by PCR: pending     Drug abuse screen 77 urine: sent    Emergency Department Course:    Reviewed:  I reviewed the patient's nursing notes, vitals, past medical records, Care Everywhere.     Assessments:  1255 I received report from the EMS staff and attempted to perform an exam of the patient. She was sedated, so I was unable to do so.    Disposition:  Care of the patient was transferred to my colleague Dr. Smipson pending psychiatric evaluation.       Impression & Plan     Covid-19  Alicia Rangel was evaluated during a global COVID-19 pandemic, which necessitated consideration that the patient might be at risk for infection with the SARS-CoV-2 virus that causes COVID-19.   Applicable protocols for evaluation were followed during the patient's care.   COVID-19 was considered as part of the patient's evaluation. The plan for testing is:  a test was obtained during this visit.      Medical Decision Makin-year-old female presenting via EMS after being sedated for agitation and reported erratic behavior prior to arrival    Differential diagnosis includes substance abuse, polysubstance abuse, acute psychiatric disturbance, generalized anxiety disorder, jackie, acute psychosis.  Given sedation, psychiatric assessment unable  to be performed at this time.  Reevaluated the patient and she is somewhat verbally combative when awakened still seemingly intoxicated.  I think it would be appropriate to have the patient evaluated by DEC when clinically sober.  Labs ordered as noted above for screening and significant for normal CBC, pregnancy test negative with remaining labs pending at signout.  Patient subsequently signed out in stable condition awaiting clinical sobriety and reevaluation.      Diagnosis:    ICD-10-CM    1. Agitation  R45.1 Asymptomatic SARS-CoV-2 COVID-19 Virus (Coronavirus) by PCR     Comprehensive metabolic panel     Alcohol ethyl     Drug abuse screen 77 urine (FL, RH, SH)     Acetaminophen level     Salicylate level       Scribe Disclosure:  I, Vimal Best, am serving as a scribe at 1:35 PM on 1/17/2021 to document services personally performed by Perez Major MD based on my observations and the provider's statements to me.          Perez Major MD  01/18/21 0150

## 2021-01-17 NOTE — ED TRIAGE NOTES
Police were at the scene of a hotel in which pt was displaying irratic behavior.  She was put in the back of a squad car and that is where she was when EMS arrived.  Pt became combative and was under the influence of unknown substances.  EMS gave pt ketamine and restrained pt.

## 2021-01-17 NOTE — ED NOTES
Bed: [facility]  Expected date: 1/17/21  Expected time: 12:51 PM  Means of arrival: Ambulance  Comments:  511 25f combative, 4pts. ETA [id number]

## 2021-01-17 NOTE — ED NOTES
Video Observation initiated, patient informed.   RESTRAINTS PLACED BY EMS WERE REMOVED IMMEDIATELY ON PATIENT ARRIVAL IN THE ED    Karla Kathleen RN

## 2021-01-18 ENCOUNTER — HOSPITAL ENCOUNTER (INPATIENT)
Facility: CLINIC | Age: 26
LOS: 65 days | Discharge: PSYCHIATRIC OR SUBSTANCE USE FACILITY | End: 2021-03-24
Attending: PSYCHIATRY & NEUROLOGY | Admitting: PSYCHIATRY & NEUROLOGY
Payer: COMMERCIAL

## 2021-01-18 ENCOUNTER — TELEPHONE (OUTPATIENT)
Dept: BEHAVIORAL HEALTH | Facility: CLINIC | Age: 26
End: 2021-01-18

## 2021-01-18 VITALS
TEMPERATURE: 98.1 F | DIASTOLIC BLOOD PRESSURE: 82 MMHG | HEART RATE: 79 BPM | OXYGEN SATURATION: 100 % | SYSTOLIC BLOOD PRESSURE: 138 MMHG | RESPIRATION RATE: 20 BRPM

## 2021-01-18 DIAGNOSIS — G43.909 MIGRAINE WITHOUT STATUS MIGRAINOSUS, NOT INTRACTABLE, UNSPECIFIED MIGRAINE TYPE: ICD-10-CM

## 2021-01-18 DIAGNOSIS — J45.909 UNCOMPLICATED ASTHMA, UNSPECIFIED ASTHMA SEVERITY, UNSPECIFIED WHETHER PERSISTENT: Primary | ICD-10-CM

## 2021-01-18 DIAGNOSIS — G47.00 INSOMNIA, UNSPECIFIED TYPE: ICD-10-CM

## 2021-01-18 DIAGNOSIS — F41.1 GENERALIZED ANXIETY DISORDER: ICD-10-CM

## 2021-01-18 DIAGNOSIS — Z78.9 USES BIRTH CONTROL: ICD-10-CM

## 2021-01-18 DIAGNOSIS — F22 PARANOIA (PSYCHOSIS) (H): ICD-10-CM

## 2021-01-18 DIAGNOSIS — R63.4 WEIGHT LOSS: ICD-10-CM

## 2021-01-18 DIAGNOSIS — F30.9 MANIA (H): ICD-10-CM

## 2021-01-18 PROBLEM — R45.1 AGITATION: Status: ACTIVE | Noted: 2021-01-18

## 2021-01-18 PROBLEM — R46.89 AGGRESSION: Status: ACTIVE | Noted: 2021-01-18

## 2021-01-18 PROCEDURE — 250N000011 HC RX IP 250 OP 636: Performed by: EMERGENCY MEDICINE

## 2021-01-18 PROCEDURE — 96372 THER/PROPH/DIAG INJ SC/IM: CPT | Performed by: EMERGENCY MEDICINE

## 2021-01-18 PROCEDURE — 250N000009 HC RX 250

## 2021-01-18 PROCEDURE — 250N000013 HC RX MED GY IP 250 OP 250 PS 637: Performed by: PSYCHIATRY & NEUROLOGY

## 2021-01-18 PROCEDURE — 99207 PR CDG-CODE CATEGORY CHANGED: CPT | Performed by: PSYCHIATRY & NEUROLOGY

## 2021-01-18 PROCEDURE — 250N000009 HC RX 250: Performed by: EMERGENCY MEDICINE

## 2021-01-18 PROCEDURE — 124N000002 HC R&B MH UMMC

## 2021-01-18 PROCEDURE — 99221 1ST HOSP IP/OBS SF/LOW 40: CPT | Performed by: PSYCHIATRY & NEUROLOGY

## 2021-01-18 RX ORDER — OLANZAPINE 5 MG/1
5 TABLET ORAL 3 TIMES DAILY
Status: DISCONTINUED | OUTPATIENT
Start: 2021-01-18 | End: 2021-01-26

## 2021-01-18 RX ORDER — HALOPERIDOL 5 MG/1
5 TABLET ORAL EVERY 4 HOURS PRN
Status: DISCONTINUED | OUTPATIENT
Start: 2021-01-18 | End: 2021-03-24 | Stop reason: HOSPADM

## 2021-01-18 RX ORDER — KETAMINE HYDROCHLORIDE 100 MG/ML
100 INJECTION, SOLUTION INTRAMUSCULAR; INTRAVENOUS ONCE
Status: COMPLETED | OUTPATIENT
Start: 2021-01-18 | End: 2021-01-18

## 2021-01-18 RX ORDER — MAGNESIUM HYDROXIDE/ALUMINUM HYDROXICE/SIMETHICONE 120; 1200; 1200 MG/30ML; MG/30ML; MG/30ML
30 SUSPENSION ORAL EVERY 4 HOURS PRN
Status: DISCONTINUED | OUTPATIENT
Start: 2021-01-18 | End: 2021-03-24 | Stop reason: HOSPADM

## 2021-01-18 RX ORDER — LORAZEPAM 2 MG/ML
2 INJECTION INTRAMUSCULAR ONCE
Status: COMPLETED | OUTPATIENT
Start: 2021-01-18 | End: 2021-01-18

## 2021-01-18 RX ORDER — OLANZAPINE 10 MG/2ML
10 INJECTION, POWDER, FOR SOLUTION INTRAMUSCULAR DAILY PRN
Status: DISCONTINUED | OUTPATIENT
Start: 2021-01-18 | End: 2021-01-18

## 2021-01-18 RX ORDER — LAMOTRIGINE 25 MG/1
25 TABLET ORAL DAILY
Status: ON HOLD | COMMUNITY
End: 2021-03-22

## 2021-01-18 RX ORDER — OLANZAPINE 5 MG/1
5-10 TABLET, ORALLY DISINTEGRATING ORAL EVERY 6 HOURS PRN
Status: DISCONTINUED | OUTPATIENT
Start: 2021-01-18 | End: 2021-01-18 | Stop reason: HOSPADM

## 2021-01-18 RX ORDER — HYDROXYZINE HYDROCHLORIDE 25 MG/1
25-50 TABLET, FILM COATED ORAL EVERY 4 HOURS PRN
Status: DISCONTINUED | OUTPATIENT
Start: 2021-01-18 | End: 2021-03-24 | Stop reason: HOSPADM

## 2021-01-18 RX ORDER — NALOXONE HYDROCHLORIDE 4 MG/.1ML
4 SPRAY NASAL PRN
Status: ON HOLD | COMMUNITY
End: 2021-03-22

## 2021-01-18 RX ORDER — OLANZAPINE 10 MG/2ML
10 INJECTION, POWDER, FOR SOLUTION INTRAMUSCULAR 3 TIMES DAILY PRN
Status: DISCONTINUED | OUTPATIENT
Start: 2021-01-18 | End: 2021-01-22

## 2021-01-18 RX ORDER — DIVALPROEX SODIUM 500 MG/1
500 TABLET, EXTENDED RELEASE ORAL 2 TIMES DAILY
Status: DISCONTINUED | OUTPATIENT
Start: 2021-01-18 | End: 2021-01-18 | Stop reason: HOSPADM

## 2021-01-18 RX ORDER — ONDANSETRON 4 MG/1
4-8 TABLET, ORALLY DISINTEGRATING ORAL EVERY 6 HOURS PRN
Status: DISCONTINUED | OUTPATIENT
Start: 2021-01-18 | End: 2021-03-24 | Stop reason: HOSPADM

## 2021-01-18 RX ORDER — LORAZEPAM 1 MG/1
1-2 TABLET ORAL EVERY 4 HOURS PRN
Status: DISCONTINUED | OUTPATIENT
Start: 2021-01-18 | End: 2021-01-22

## 2021-01-18 RX ORDER — CLONIDINE HYDROCHLORIDE 0.1 MG/1
0.1 TABLET ORAL 2 TIMES DAILY PRN
Status: DISCONTINUED | OUTPATIENT
Start: 2021-01-18 | End: 2021-02-12

## 2021-01-18 RX ORDER — WATER 10 ML/10ML
INJECTION INTRAMUSCULAR; INTRAVENOUS; SUBCUTANEOUS
Status: DISCONTINUED
Start: 2021-01-18 | End: 2021-01-18 | Stop reason: HOSPADM

## 2021-01-18 RX ORDER — OLANZAPINE 10 MG/2ML
5 INJECTION, POWDER, FOR SOLUTION INTRAMUSCULAR ONCE
Status: COMPLETED | OUTPATIENT
Start: 2021-01-18 | End: 2021-01-18

## 2021-01-18 RX ORDER — OLANZAPINE 5 MG/1
5 TABLET, ORALLY DISINTEGRATING ORAL 3 TIMES DAILY
Status: DISCONTINUED | OUTPATIENT
Start: 2021-01-18 | End: 2021-01-18 | Stop reason: HOSPADM

## 2021-01-18 RX ORDER — LEVONORGESTREL/ETHIN.ESTRADIOL 0.1-0.02MG
1 TABLET ORAL DAILY
Status: ON HOLD | COMMUNITY
End: 2021-03-22

## 2021-01-18 RX ORDER — NICOTINE 21 MG/24HR
1 PATCH, TRANSDERMAL 24 HOURS TRANSDERMAL DAILY
Status: DISCONTINUED | OUTPATIENT
Start: 2021-01-18 | End: 2021-01-22

## 2021-01-18 RX ORDER — DIPHENHYDRAMINE HCL 50 MG
50 CAPSULE ORAL EVERY 4 HOURS PRN
Status: DISCONTINUED | OUTPATIENT
Start: 2021-01-18 | End: 2021-02-15

## 2021-01-18 RX ORDER — DIPHENHYDRAMINE HYDROCHLORIDE 50 MG/ML
50 INJECTION INTRAMUSCULAR; INTRAVENOUS EVERY 4 HOURS PRN
Status: DISCONTINUED | OUTPATIENT
Start: 2021-01-18 | End: 2021-02-15

## 2021-01-18 RX ORDER — WATER 10 ML/10ML
INJECTION INTRAMUSCULAR; INTRAVENOUS; SUBCUTANEOUS
Status: COMPLETED
Start: 2021-01-18 | End: 2021-01-18

## 2021-01-18 RX ORDER — DIVALPROEX SODIUM 500 MG/1
500 TABLET, EXTENDED RELEASE ORAL 2 TIMES DAILY
Status: DISCONTINUED | OUTPATIENT
Start: 2021-01-18 | End: 2021-01-20

## 2021-01-18 RX ORDER — CLONIDINE HYDROCHLORIDE 0.1 MG/1
0.1 TABLET ORAL 3 TIMES DAILY
Status: ON HOLD | COMMUNITY
Start: 2021-01-07 | End: 2021-03-22

## 2021-01-18 RX ORDER — ALBUTEROL SULFATE 90 UG/1
1-2 AEROSOL, METERED RESPIRATORY (INHALATION) EVERY 4 HOURS PRN
Status: ON HOLD | COMMUNITY
Start: 2021-01-14 | End: 2021-03-22

## 2021-01-18 RX ORDER — HYDROXYZINE PAMOATE 25 MG/1
25-50 CAPSULE ORAL EVERY 8 HOURS PRN
Status: ON HOLD | COMMUNITY
End: 2021-03-22

## 2021-01-18 RX ORDER — ACETAMINOPHEN 325 MG/1
650 TABLET ORAL EVERY 4 HOURS PRN
Status: DISCONTINUED | OUTPATIENT
Start: 2021-01-18 | End: 2021-03-24 | Stop reason: HOSPADM

## 2021-01-18 RX ORDER — LANOLIN ALCOHOL/MO/W.PET/CERES
3 CREAM (GRAM) TOPICAL
Status: DISCONTINUED | OUTPATIENT
Start: 2021-01-18 | End: 2021-03-24 | Stop reason: HOSPADM

## 2021-01-18 RX ORDER — HALOPERIDOL 5 MG/ML
5 INJECTION INTRAMUSCULAR EVERY 4 HOURS PRN
Status: DISCONTINUED | OUTPATIENT
Start: 2021-01-18 | End: 2021-03-24 | Stop reason: HOSPADM

## 2021-01-18 RX ORDER — BUDESONIDE 180 UG/1
1 AEROSOL, POWDER RESPIRATORY (INHALATION) 2 TIMES DAILY
Status: ON HOLD | COMMUNITY
Start: 2021-01-14 | End: 2021-03-22

## 2021-01-18 RX ORDER — LORAZEPAM 2 MG/ML
1-2 INJECTION INTRAMUSCULAR EVERY 4 HOURS PRN
Status: DISCONTINUED | OUTPATIENT
Start: 2021-01-18 | End: 2021-01-22

## 2021-01-18 RX ORDER — BUPRENORPHINE 2 MG/1
4 TABLET SUBLINGUAL
Status: COMPLETED | OUTPATIENT
Start: 2021-01-18 | End: 2021-01-19

## 2021-01-18 RX ORDER — OLANZAPINE 10 MG/2ML
10 INJECTION, POWDER, FOR SOLUTION INTRAMUSCULAR EVERY 6 HOURS PRN
Status: DISCONTINUED | OUTPATIENT
Start: 2021-01-18 | End: 2021-01-18 | Stop reason: HOSPADM

## 2021-01-18 RX ORDER — OLANZAPINE 10 MG/1
10 TABLET ORAL 3 TIMES DAILY PRN
Status: DISCONTINUED | OUTPATIENT
Start: 2021-01-18 | End: 2021-01-22

## 2021-01-18 RX ORDER — AMOXICILLIN 250 MG
1 CAPSULE ORAL 2 TIMES DAILY PRN
Status: DISCONTINUED | OUTPATIENT
Start: 2021-01-18 | End: 2021-03-24 | Stop reason: HOSPADM

## 2021-01-18 RX ORDER — LOPERAMIDE HCL 2 MG
2 CAPSULE ORAL EVERY 4 HOURS PRN
Status: DISCONTINUED | OUTPATIENT
Start: 2021-01-18 | End: 2021-03-24 | Stop reason: HOSPADM

## 2021-01-18 RX ORDER — LEVONORGESTREL/ETHIN.ESTRADIOL 0.1-0.02MG
1 TABLET ORAL DAILY
Status: DISCONTINUED | OUTPATIENT
Start: 2021-01-18 | End: 2021-03-24 | Stop reason: HOSPADM

## 2021-01-18 RX ADMIN — OLANZAPINE 10 MG: 5 TABLET, ORALLY DISINTEGRATING ORAL at 17:18

## 2021-01-18 RX ADMIN — OLANZAPINE 10 MG: 10 INJECTION, POWDER, LYOPHILIZED, FOR SOLUTION INTRAMUSCULAR at 06:45

## 2021-01-18 RX ADMIN — KETAMINE HYDROCHLORIDE 100 MG: 100 INJECTION INTRAMUSCULAR; INTRAVENOUS at 10:46

## 2021-01-18 RX ADMIN — OLANZAPINE 5 MG: 10 INJECTION, POWDER, LYOPHILIZED, FOR SOLUTION INTRAMUSCULAR at 08:46

## 2021-01-18 RX ADMIN — OLANZAPINE 5 MG: 10 INJECTION, POWDER, LYOPHILIZED, FOR SOLUTION INTRAMUSCULAR at 09:20

## 2021-01-18 RX ADMIN — WATER 1 ML: 1 INJECTION INTRAMUSCULAR; INTRAVENOUS; SUBCUTANEOUS at 08:47

## 2021-01-18 RX ADMIN — LORAZEPAM 2 MG: 2 INJECTION INTRAMUSCULAR; INTRAVENOUS at 07:36

## 2021-01-18 ASSESSMENT — ACTIVITIES OF DAILY LIVING (ADL): HYGIENE/GROOMING: INDEPENDENT

## 2021-01-18 NOTE — ED NOTES
"Patient given sandwich tray.  When nurse comes into room as patient requested food (screamed at nurse for food).   [place] tray given.  Patient screaming \"MY STOMACH HURTS!!  MY TEMPLES HURT!!!\"  WHY HAVEN'T YOU BROUGHT ME ANYTHING!!!  Reminded patient we just gave her Tylenol.  Patient yells \"I DON\"T WANT TO BE HERE!   I NEVER ASKED TO BE HERE!\"  Asked patient to stop yelling & talk respectfully.  Patient screams \"I AM NOT SCREAMING!!\"      Dr Dubose aware of patient behavior.   "

## 2021-01-18 NOTE — ED NOTES
Patient resting in bed with eyes closed. Restraints removed while verbalizing to patient that she needs to cooperate with staff to keep restraints off. Patient very sleepy and not responding, but is moving all extremities independently. Restraints removed by security. Patient still in bed, being monitored via video.

## 2021-01-18 NOTE — TELEPHONE ENCOUNTER
Claraeint brought to [facility] by EMS  B: pt was seen in ED a few days ago with psychosis butcleared and was sent home. She presents on this occasion very psychotic.  She was brought in by EMS.  She was acting abnormally.  She became so combative they had to give her ketamine in the field and was talking about wanting to kill the paramedics.  She ended up in 4-point restraints.  She is currently in restraints here in the emergency room.  She is on a 72-hour hold.   The patient is lying on a gurney in restraints.  Eyes are partially closed.  She is mumbling incoherently, yelling out, thrashing about.  She appears internally preoccupied, talking to herself.  Insight and judgment grossly impaired.  Cognitive exam grossly impaired.  Covid negative result  A: bipolar, Poly sub abuse. 72 hr hold  R:  Abdirizak/Nely  Patient cleared and ready for behavioral bed placement: Yes

## 2021-01-18 NOTE — ED PROVIDER NOTES
Patient signed out to me by Dr. Dubose.  She is polysubstance abuse history and is high on methamphetamine.  She has been a frequent visitor to emergency room's with similar symptoms.  She is been very aggressive and needs admission.  She had slept overnight after the ketamine that she got but then when she awoke this morning, started threatening to kill people and was very aggressive with staff including purity.  She had to be put back in five-point restraints.  Please see Dr. Dubose's note for details.  She was given 10 mg of IM Zyprexa.  When I came on, the patient was still very agitated and yelling and so I ordered 2 mg of IM Ativan with continued five-point restraints.  She was not yelling at this point but still fighting the restraints so she was given another 5 mg of IM Zyprexa.  I talked with DEC who was consulted again and they are not able to do a assessment.  Dr. Morales from psychiatry came down and saw the patient before she was given the second dose of Zyprexa and was able to talk to her and felt that she definitely needs admission and likely has underlying untreated bipolar disorder.  They are waiting for discharge up on the mental health unit.  I did another face-to-face assessment at 8:20 AM and the patient still requires restraints at this time until we can get control of her agitation with medication.    I reevaluated the patient at 915 and while she is not yelling out, she is still very restless in the bed.  She is still in five-point restraints for her own safety and staff safety.  I have ordered another 5 mg of Zyprexa IM and she will be observed.  There is still was not a bed available for her.    The patient continues to be very agitated but not yelling out.  I decided to give her 100 mg of IM ketamine.  Reevaluated her at 1035 and she still requires restraints.  Face-to-face was done.  Restraints were reordered for 1045 with a goal hopefully to be able to get them off sometime over the next  hour.    The restraints were removed after the ketamine.  She has been somewhat fidgety but cooperative and sleeping.  She was assisted to the bathroom and needed the assistance walking because she was unsteady but has been very cooperative.  The nurse was told to call after 330 this afternoon to check on bed availability here.  She is awaiting a psychiatric bed and is on a 72-hour hold.    She is signed out to my partner Dr. Bennett pending bed placement.      Labs Ordered and Resulted from Time of ED Arrival Up to the Time of Departure from the ED   COMPREHENSIVE METABOLIC PANEL - Abnormal; Notable for the following components:       Result Value    Glucose 63 (*)     All other components within normal limits   DRUG ABUSE SCREEN 77 URINE (FL, RH, SH) - Abnormal; Notable for the following components:    Amphetamine Qual Urine Positive (*)     Opiates Qualitative Urine Positive (*)     All other components within normal limits   SARS-COV-2 (COVID-19) VIRUS RT-PCR   CBC WITH PLATELETS DIFFERENTIAL   ALCOHOL ETHYL   ACETAMINOPHEN LEVEL   SALICYLATE LEVEL   ISTAT HCG QUANTITATIVE PREGNANCY NURSING POCT   DOCUMENT IN LEGAL HOLD NAVIGATOR   VITAL SIGNS   PATIENT CARE ORDER   NOTIFY PROVIDER   MEASURE WEIGHT   DOCUMENT   ISTAT HCG QUANTITATIVE PREGNANCY POCT         ICD-10-CM    1. Agitation  R45.1 Asymptomatic SARS-CoV-2 COVID-19 Virus (Coronavirus) by PCR     Comprehensive metabolic panel     Alcohol ethyl     Drug abuse screen 77 urine (FL, RH, SH)     Acetaminophen level     Salicylate level   2. Polysubstance abuse (H)  F19.10    3. Aggression  R46.89           Zayra Frias MD  01/18/21 8766
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
## 2021-01-18 NOTE — ED PROVIDER NOTES
"This patient was signed out to me by Dr. Major.  She has a history of polysubstance abuse, bipolar depression, paranoia, jackie who presents agitated.  She initially required ketamine and restraints as she was being aggressive and threatening towards paramedics.    1840 patient has barricaded herself in the bathroom and is hitting walls, threatening staff, saying she is \"going to kill everybody.\"  Verbally screaming profanities at staff.  Unwilling to return to room.  As she was escalating and posing danger to self and staff she required 10 IM of Zyprexa.    2245 Pt still sleeping. Being signed out to oncoming provider. Will still need DEC assessment once clinically sober.    MD Tatiana Fishman, Erin Monreal MD  01/17/21 2239    "

## 2021-01-18 NOTE — ED NOTES
"Patient provided with her belongings and discharge papers.  Patient began yelling at staff to get her food and a taxi cab back to her car.  Patient screaming that \"you guys are fucked up, I'm going to get you all fired.\"   Patient then lunged at RN from her bed.  Security already present and assisted the patient back to bed.  MD at bedside.  Patient continues to scream at staff and yells \"I hope you all loose your jobs then go to hell, I'm going to kill you all.\"  10mg IM Zyprexa given and patient was placed in restraints due to violent behavior and threats.  Patient continued to scream during the process of getting restrained and calling staff derogatory names.  Patient placed in 5 point restraints and hooked up to monitors.    "

## 2021-01-18 NOTE — CONSULTS
"Consult Date:  01/18/2021      PSYCHIATRY CONSULTATION       REASON FOR CONSULTATION:  Psychosis/drug use.      REQUESTING PHYSICIAN:  Dr. Dubose      IDENTIFYING DATA:  Alicia is a 25-year-old woman with an established history of bipolar disorder, but she also has drug use issues.  She was here a few days ago agitated and combative, but seemed to sober up and they discharged her back to the community.  She presents on this occasion very psychotic.  She was brought in by EMS.  She was acting abnormally.  She became so combative they had to give her ketamine in the field and was talking about wanting to kill the paramedics.  She ended up in 4-point restraints.  She is currently in restraints here in the emergency room.  She is on a 72-hour hold.      CHIEF COMPLAINT:  \"Where am I?\"      HISTORY OF PRESENT ILLNESS:  The patient is a 25-year-old woman who by chart review has an established history of type 1 bipolar disorder.  She also has a notable history of chemical dependency and seems to abuse methamphetamines.  Her current drug screen is positive for amphetamines and opiates.  Past records indicate she has abused a number of substances including cocaine and stimulants.  She mentions something about being on Lamictal.  It looks like she typically had been prescribed Depakote and Abilify in the past as well as Seroquel and Zyprexa.  Rexulti is also mentioned as a prior to admission medication.  Her last admission to the system was in 2015, but she has had contact through [facility] back in 2017 and I know at one point she was followed by Dr. Nj at [facility].  This morning, she cannot give me any meaningful history, she is thrashing about, mumbling incoherently in restraints.      PAST PSYCHIATRIC HISTORY:  As above.      PAST CHEMICAL DEPENDENCY HISTORY:  Notable for polydrug dependence; amphetamines, opiates and cocaine are mentioned in past records.      PAST MEDICAL HISTORY:  Per chart " review, is very limited, she may be living in a hotel.      FAMILY AND SOCIAL HISTORY:  Very limited, she may be living in a hotel.      A 10-point review of systems is notable for marked agitation on neurologic exam, otherwise negative.      MOST RECENT VITAL SIGNS:  Blood pressure 121/66, temp 98.1, pulse 129, respiratory rate 20, oxygen saturation 99%.      MENTAL STATUS EXAMINATION:  Very limited.  The patient is lying on a gurney in restraints.  Eyes are partially closed.  She is mumbling incoherently, yelling out, thrashing about.  She appears internally preoccupied, talking to herself.  Insight and judgment grossly impaired.  Cognitive exam grossly impaired.      IMPRESSION:  The patient is a 25-year-old woman presenting very altered.  Historically, she has a type 1 bipolar diagnosis, which is probably fueled by amphetamine use.  We will initiate some Zyprexa and Depakote.  She meets criteria for a 72-hour hold in a psychiatric emergency.  Given her recent visit to the ER and current mental state, I see no alternative but to recommend admission to Psychiatry for further stabilization.      DIAGNOSES:   1.  Amphetamine-induced psychotic disorder.   2.  Bipolar disorder, type 1 by history, most recent episode manic with psychotic features.   3.  Polydrug dependence.      PLAN:   1.  Start Zyprexa Zydis 5 mg t.i.d., IM doses are available if she meets criteria for psychiatric emergency.   2.  We will offer Depakote  mg b.i.d.; unclear if she will take this, but historically she has done well with it.   3.  Maintain 72-hour hold.  I told Dr. Frias that she does not need to be seen by the DEC.  She is not assessable at this time and clearly needs to be put on the wait list for a psychiatric bed.  Her hold technically will not start until midnight because today is a holiday.         MONE SUH MD             D: 01/18/2021   T: 01/18/2021   MT: RONNELL      Name:     MIRELA MARQUIS   MRN:       [id number]        Account:       [id number]   :      1995           Consult Date:  2021      Document: [id number]

## 2021-01-18 NOTE — PHARMACY-ADMISSION MEDICATION HISTORY
Pharmacy Medication History  Admission medication history interview status for the 1/17/2021  admission is complete. See EPIC admission navigator for prior to admission medications     Location of Interview: Outside patient room but on unit  Medication history sources: Surescripts and Care Everywhere - updated based on recent prescription fill history  Medication history source reliability: Moderate    Additional medication history information:     --Suboxone 2-0.5 mg buccal tablets BID last filled 11/19/20 for 7 days supply.     Medication reconciliation completed by provider prior to medication history? No    Time spent in this activity: 10 min      Prior to Admission medications    Medication Sig Last Dose Taking? Auth Provider   lamoTRIgine (LAMICTAL) 25 MG tablet Take 25 mg by mouth daily  Yes Unknown, Entered By History   levonorgestrel-ethinyl estradiol (AVIANE) 0.1-20 MG-MCG tablet Take 1 tablet by mouth daily  Yes Unknown, Entered By History   albuterol (PROAIR HFA/PROVENTIL HFA/VENTOLIN HFA) 108 (90 Base) MCG/ACT inhaler Inhale 1-2 puffs into the lungs every 4 hours as needed for shortness of breath / dyspnea   Unknown, Entered By History   cloNIDine (CATAPRES) 0.1 MG tablet Take 0.1 mg by mouth 3 times daily    Unknown, Entered By History   hydrOXYzine (VISTARIL) 25 MG capsule Take 25-50 mg by mouth every 8 hours as needed for anxiety   Unknown, Entered By History   naloxone (NARCAN) 4 MG/0.1ML nasal spray Spray 4 mg into one nostril alternating nostrils as needed Call 911. Repeat in opposite nostril in 3 in as needed.   Unknown, Entered By History   nicotine (NICORETTE) 2 MG gum Take 1 each by mouth 10 times per day as needed   Unknown, Entered By History   PULMICORT FLEXHALER 180 MCG/ACT inhaler Inhale 1 puff into the lungs 2 times daily   Unknown, Entered By History   SUMAtriptan (IMITREX) 50 MG tablet Take 1 tablet (50 mg) by mouth at onset of headache 1 tab as needed for TAVAREZ   AndAiden craig MD

## 2021-01-18 NOTE — ED NOTES
Transport has been arranged for this pt - transport has been arranged with the expectation that this pt may be violent at the time EMS arrive. Md is aware of this situation and [facility] has stated they will take the patient in restraints if needed.

## 2021-01-18 NOTE — ED NOTES
"Writer entered patient's (pt) room when pt was yelling from her room. Upon entering, pt asked writer \"Where the fuck is the bathroom?\". Writer responded \"Bathroom is down the mitchell, but if you need to go we need a urine sample\". Writer disconnected pt from the monitor--pt sprang up from the bed and continued to say obscenities to writer as she walked to the bathroom.     Once in the bathroom, pt had demanded writer \"get me some goddamn eye drop solution\" to which writer responded, \"no\". Pt then continued \"You're worthless, you piece of shit, what good are you. I'm firing you\". Pt continued to pace the bathroom and yell obscenities, mostly directed towards writer. [facility] had called for more security for backup as pt continued to yell and say, \"I'll fuck you up. I'm going to kill you\". Pt had continued to target and yell at writer. Writer escorted by security back to her room and given zyprexa via her RN.    "

## 2021-01-18 NOTE — ED NOTES
Dr Dubose aware of patients MARSHAL which has  at 0233.  Patient is sleeping and is cooperative with staff and will continue being monitored voluntarily.
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
## 2021-01-19 ENCOUNTER — TRANSFERRED RECORDS (OUTPATIENT)
Dept: HEALTH INFORMATION MANAGEMENT | Facility: CLINIC | Age: 26
End: 2021-01-19

## 2021-01-19 PROCEDURE — 124N000002 HC R&B MH UMMC

## 2021-01-19 PROCEDURE — HZ2ZZZZ DETOXIFICATION SERVICES FOR SUBSTANCE ABUSE TREATMENT: ICD-10-PCS | Performed by: PSYCHIATRY & NEUROLOGY

## 2021-01-19 PROCEDURE — 250N000013 HC RX MED GY IP 250 OP 250 PS 637: Performed by: PSYCHIATRY & NEUROLOGY

## 2021-01-19 PROCEDURE — 99223 1ST HOSP IP/OBS HIGH 75: CPT | Mod: 95 | Performed by: PSYCHIATRY & NEUROLOGY

## 2021-01-19 RX ORDER — SUMATRIPTAN 50 MG/1
50 TABLET, FILM COATED ORAL DAILY PRN
Status: DISCONTINUED | OUTPATIENT
Start: 2021-01-19 | End: 2021-01-19

## 2021-01-19 RX ORDER — SUMATRIPTAN 50 MG/1
50 TABLET, FILM COATED ORAL 2 TIMES DAILY PRN
Status: DISCONTINUED | OUTPATIENT
Start: 2021-01-19 | End: 2021-03-24 | Stop reason: HOSPADM

## 2021-01-19 RX ORDER — ALBUTEROL SULFATE 90 UG/1
1-2 AEROSOL, METERED RESPIRATORY (INHALATION) EVERY 4 HOURS PRN
Status: DISCONTINUED | OUTPATIENT
Start: 2021-01-19 | End: 2021-03-24 | Stop reason: HOSPADM

## 2021-01-19 RX ORDER — GABAPENTIN 300 MG/1
300 CAPSULE ORAL 3 TIMES DAILY PRN
Status: DISCONTINUED | OUTPATIENT
Start: 2021-01-19 | End: 2021-02-12

## 2021-01-19 RX ADMIN — BUPRENORPHINE HCL 4 MG: 2 TABLET SUBLINGUAL at 01:06

## 2021-01-19 RX ADMIN — ACETAMINOPHEN 650 MG: 325 TABLET, FILM COATED ORAL at 15:21

## 2021-01-19 RX ADMIN — OLANZAPINE 5 MG: 5 TABLET, FILM COATED ORAL at 15:19

## 2021-01-19 RX ADMIN — ACETAMINOPHEN 650 MG: 325 TABLET, FILM COATED ORAL at 09:16

## 2021-01-19 RX ADMIN — LEVONORGESTREL AND ETHINYL ESTRADIOL 1 TABLET: KIT at 09:04

## 2021-01-19 ASSESSMENT — ACTIVITIES OF DAILY LIVING (ADL)
DRESS: INDEPENDENT
LAUNDRY: UNABLE TO COMPLETE
ORAL_HYGIENE: PROMPTS
LAUNDRY: UNABLE TO COMPLETE
DRESS: INDEPENDENT
HYGIENE/GROOMING: PROMPTS
HYGIENE/GROOMING: PROMPTS
ORAL_HYGIENE: PROMPTS
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
## 2021-01-19 NOTE — ED NOTES
I have called pt's mother and updated her on where her daughter is going and on what happened the last 24 hours. Pt had given me permission to update her mother. I have called eitan and updated that pt has left peacefully and that I had updated her mother.

## 2021-01-19 NOTE — PLAN OF CARE
Initial Psychosocial Assessment    I have reviewed the chart, met with the patient, and developed Care Plan.  Information for assessment was obtained from chart notes and collateral from mother.  Writer unable to meet with patient today due to level of illness.     Presenting Problem:  Patient was admitted on a 72 hour hold with agitation, aggression, polysubstance abuse (heroin and meth).  She has had several recent ED visits and likely has been off medications for a while.  Per mom, there have been attempts to get patient into treatment but patient is resistant and untreated mental illness has also been a preventing factor.      History of Mental Health and Chemical Dependency:  Patient has a history of bipolar, depression, paranoia, TIMOTHY and prior CD treatments.  She experienced an overdose in November requiring Narcan.  [facility] was [facility] to [facility] for [facility] but they were not able to accept her due to ongoing psychosis.      Family Description (Constellation, Family Psychiatric History):  Patient's parents  when patient was age 10.  She was raised by both parents, who still each live in [place].  She has one older brother.  Father with depression and alcohol use disorder.      Significant Life Events (Illness, Abuse, Trauma, Death):  Unable to assess    Living Situation:  Patient stays with mom or dad or couch hops when using.  Mother's address in [place] is her mailing address.    Educational Background:  Patient completed high school    Occupational History:  Patient is not employed and has not worked for at least 1.5 years.     Financial Status:  No income, Blue Plus medical insurance.  Parents help support.    Legal Issues:  Probation in [facility] for drug possession.  PO is Deborah Arnold [phone].     Ethnic/Cultural Issues:  None noted.    Spiritual Orientation:  Unable to assess     Service History:  None     Social Functioning (organization,  interests):  Unable to assess    Current Treatment Providers are:  Per mom, most recent provider was Dr. Dudley at the [facility] [phone].  They have discontinued their care of patient due to lack of follow-through.     Social Service Assessment/Plan:  Patient has been seen by the psychiatric provider.  Medications have been offered.  Patient has signed ONDINA for mom Michi ([phone]) and she has been contacted for collateral.  MI/CD petition for commitment with [facility] in [facility] has been filed today.  Patient will need a CD evaluation when she is more stable.  Mom indicates that [facility] may be one option for patient.  Patient will meet with the treatment team daily and care will continue to be offered throughout the court process.  CTC available to assist as needed.

## 2021-01-19 NOTE — PLAN OF CARE
BEHAVIORAL TEAM DISCUSSION    Participants: Alicia Reveles MD; Audrey Tiwari RN; Shayy JOYA; Veronica Del Valle OT  Progress: none   Anticipated length of stay: Unknown pending stabilization and court process.  Continued Stay Criteria/Rationale: Patient is newly admitted with severe mental health and substance abuse issues.  Unable to care for self or participate in treatment planning at this time.  Petition for commitment initiated.   Medical/Physical: Patient is being monitored for withdrawal.  Precautions:   Behavioral Orders   Procedures     Assault precautions     Code 1 - Restrict to Unit     Elopement precautions     Fall precautions     Routine Programming     As clinically indicated     Routine Programming     As clinically indicated     Status 15     Every 15 minutes.     Status 15     Every 15 minutes.     Status Individual Observation     Patient SIO status reviewed with team/RN.  Please also refer to RN/team documentation for add'l detail.    -SIO staff to monitor following which have contributed to patient being on SIO:  Unsteady, recent fall  -Possible interventions SIO staff could use to support patient's treatment progress:    -When following observed, team will review discontinuation of SIO:     Order Specific Question:   CONTINUOUS 24 hours / day     Answer:   5 feet     Order Specific Question:   Indications for SIO     Answer:   Self-injury risk     Plan: Patient will need stabilization with medications and chemical dependency treatment.  Petition for commitment needed and started in [facility].  CTC to coordinate care with court and formulate appropriate discharge plan once patient has stabilized.  Rationale for change in precautions or plan: initial plan.

## 2021-01-19 NOTE — ED NOTES
Pt was able to be discharged peacefully with ALS crew. Pt wanted me to call her mom . I am updating her now.

## 2021-01-19 NOTE — PLAN OF CARE
"  Problem: Behavior Regulation Impairment (Disruptive Behavior)  Goal: Improved Impulse and Aggression Control (Disruptive Behavior)  Outcome: No Change    Received pt sleeping in her room.  At 1620H, received a call from Dr. Reveles that SIO will be discontinued. Order carried out.   At 1645H, Pt received a call from court. When she was informed regarding the call, pt was irritable, crying and started yelling at staff. She walked out of her room yelling \"Fuck you!\". She was also observed yelling while she was talking to someone on the phone. After the call, patient went inside her room and asked for snacks, yelling \"Do you have food? I am hungry!\". Offered snacks and was given options. Able to consume 50% of the sandwich that was already in her room, and sips of fluids.   Pt appears to be irritable, teary eyed. Pt is dismissive to staff when asked questions. COWS score is 8. Vital signs /57   Pulse 78   Temp 98.6  F (37  C) (Tympanic)   Resp 18   SpO2 96% .    Consumed 25% of share of dinner and took approximately 480cc of fluids.   At 1925H, offered pt her medications. Pt presented irritable mood and raised her voice towards staff. Pt refused Depakote and Zyprexa. Claimed \"I am taking lamotrigine! You guys fucked up my medications?!\". Education given on the importance of adhering to taking her medications and the indication of each. Pt still refused to take her medications.  Pt refused to participate on 1:1 assessment. Pt did not exhibit any suicidal or self injurious behavior. Presents to be irritable, disheveled and observed mumbling to herself. Pt slept mostly of the shift.    Needs attended to. No further concerns noted as of this time.       "
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
## 2021-01-19 NOTE — PROGRESS NOTES
It was reported to writer that patient fell during clothing search after arriving to the unit, please see other RN shift note regarding fall. Per staff witnessing event she did not hit her head.     VS upon arrival  (before fall) at 1900:   Patient was unable to remain still, frequently moving, result may not be accurate, unable to obtain temperature and O2 level.  108/64 R/arm  HR-101    VS assessed again at 1945:  Patient again moving frequently and unable to remain still. VS may not be accurate, were as follows:  T-99.5 Tympanic  B/P R/Arm laying, 98/46  HR-65  O2-100% R/A    Writer contacted on call provider Aiden Erwin MD and reported patient's fall along with VS diastolic blood pressure reading of 46 at 1945 and possible inaccuracy d/t patient's movement. Aiden Erwin MD reported NSG to reassess patient's VS when calmer and continue to monitor.     Patient  placed on SIO 1:1 for safety due to patient being unsteady while ambulation, order received from MD Alicia Reveles. Patient placed on fall precautions. Will continue to monitor for safety through this shift.        Mgmt per nephrology

## 2021-01-19 NOTE — PHARMACY-ADMISSION MEDICATION HISTORY
Please see Admission Medication History note completed?by pharmacist?on 1/18/21 under previous encounter at [facility] Emergency [facility] for information regarding prior to admission medications.    Randa Camarena pharm.D

## 2021-01-19 NOTE — PROGRESS NOTES
Pt in her room sleeping when writer was was about to assess for COWS. Unable to get vital signs as of this time.

## 2021-01-19 NOTE — PROVIDER NOTIFICATION
Pt slipped upon admission when in the intake room. She had on hospital socks, but they were shifted so the sticky spots were on the sides of her feet rather than in contact with the ground. Unfortunately, it was not report off to accepting RN that pt was unsteady on her feet. When she got off of the gurney, she was unsteady. She walked the 10 feet into the intake room, tried to turn around and began to slip. She trying to catch herself and staff attempted to help pt, but her foot slipped and she fell from a bent-leg position onto her bottom, caught herself with her elbows, and began to cry. Writer witnessed this whole incident and pt's head did not come in contact with the floor

## 2021-01-19 NOTE — PLAN OF CARE
Pt appears to have slept 6.5 hours this shift, remains on 1:1 SIO for fall risk. Will continue to monitor and support plan of care.
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
## 2021-01-19 NOTE — PROGRESS NOTES
01/19/21 0039   Patient Belongings   Did you bring any home meds/supplements to the hospital?  Yes   Disposition of meds  Sent to security/pharmacy per site process;Other (see comment)   Patient Belongings locker;sent to security per site process;other (see comments)   Patient Belongings Put in Hospital Secure Location (Security or Locker, etc.) cash/credit card;money (see comment);other (see comments)   Belongings Search Yes   Clothing Search Yes   Second Staff Perfecto O     Items in patient locker: 1 leggings, 1 tank top, 1 sweatshirt (ripped), 1 pair socks, 1 pair underwear, 1 scarf, 1 pair fashion boots, 1 black purse, 1 [place] ID, 1 pack cigarettes, 1 book, 1 bra, 1 pink wallet, misc. Silver and gold colored jewerly, lotion, make up brushes, 2 set of keys, miscellaneous make up, iphone w/ cracked screen, iphone case, miscellaneous toiletries, 1 mitten, mask, 2 nail clippers    Additional items brought in by mother on 2/12:  Black duffle bag-  Black pants x2  Grey pants  Grey sweatshirt  Green sweatshirt x2  Pink sweatshirt  Plastic bag containing underwear    Black/pink back pack-  Black shoes  Red/black slippers (with patient on unit)  Black/grey top  Pink hat  Grey face mask  Makeup bag  Contact lenses    Items to security ([id number]): visa debit #[id number], [employer] gift card, capital 1 #[id number], visa debit #[id number], mastercard debit #[id number], american express card #[id number], green dot card #[id number]    Medications sent to security in envelope ([id number])  A               Admission:  I am responsible for any personal items that are not sent to the safe or pharmacy.  [facility] is not responsible for loss, theft or damage of any property in my possession.    Signature:  _________________________________ Date: _______  Time: _____                                              Staff Signature:  ____________________________ Date: ________  Time: _____      2nd Staff person, if patient is unable/unwilling to sign:    Signature:  ________________________________ Date: ________  Time: _____     Discharge:  [facility] has returned all of my personal belongings:    Signature: _________________________________ Date: ________  Time: _____                                          Staff Signature:  ____________________________ Date: ________  Time: _____

## 2021-01-19 NOTE — PROGRESS NOTES
At approximately 2310 Pt arose from bed and walked out of her room. Pt stated that she had a migraine. Pt appeared to be very unsteady on her feet. SIO staff assisted Pt to chair in lounge.     Pt complaining of migraine. Staff asked about specific symptoms, Pt did not respond to questions. Staff asked Pt questions to assess orientation (full name, physical location-[facility], city). Pt did not respond to questions. Pt appeared to be mumbling and incoherent.    Pt asked for snacks and water. Pt was provided severiano snacks and cheese sticks and water. Pt needed assistance with removing snacks from the wrappers.     Pt appears to be confused and disorganized. She would reach out her hands in the air and attempt to grab things that were not there.     Blood pressure assessed with pt sitting in chair. 131/64 with pulse rate of 91. O2 sats, temperature, and respirations were unable to be assessed due to Pt's constant movement in chair.     Pt was assisted back into bed. Pt is tossing and turning in bed. Pt will continue on SIO monitoring due to fall risk and disorganization.

## 2021-01-19 NOTE — PLAN OF CARE
"Problem: Behavioral Health Plan of Care  Goal: Absence of New-Onset Illness or Injury  Outcome: No Change     Pt is on an SIO 1:1 due to fall risk. Pt was awakened around 0900 to give the pt morning medications. Pt became very agitated, yelled, and flailed her arms. She loudly refused her Depakote, stating she takes Lamictal. She refused her Zyprexa and her inhaler. The pt took Tylenol for a headache and her birth control pill, Aviane. Pt then ran from her room, yelling and swearing, and went to the phone and called her mother. She asked her mother to \"get me out of here.\"    At 0900, the pt's VS were BP 94/53   Pulse 58   Temp 98  F (36.7  C) (Tympanic)   Resp 18   SpO2 98% . COWS score was 10. Pt refused gabapentin for withdrawal symptoms, which included restlessness, tremor, and irritability.  Pt refused Imitrex for her self reported migraine headache. Encouraged pt to drink fluids and placed a glass of water at her bedside. Educated pt on low BP and to get up slowly and to drink fluids. Pt refused teaching.    Pt refused 1:1 assessment. Pt did not display suicidal or self injurious bx. Other than a headache, pt denied any pain or acute physical concerns. Pt presented as disoriented, confused, disheveled, and mumbling at times. Pt was less sedated than last night; however, the pt was very sleepy. Pt slept for 6.5 hours this shift. Pt ate only a few bites of breakfast and refused her lunch.      Pt's mother called at 0815 and gave information regarding the pt (see previous note).  Pt agreed to sign a release for her mother. Pt was laying in bed with her eyes closed and would not sit up to sign the ONDINA. When asked if she would allow this writer to sign for her, she said \"yes\". A witness, N.I., was present. Both this RN and the witness signed and initialed the ONDINA on behalf of the pt. ONDINA is in the pt's chart.    Pt got up to go to the bathroom and pt given Tylenol at 1520 for a headache. Will continue to " monitor.

## 2021-01-20 PROCEDURE — 99233 SBSQ HOSP IP/OBS HIGH 50: CPT | Mod: 95 | Performed by: PSYCHIATRY & NEUROLOGY

## 2021-01-20 PROCEDURE — 250N000013 HC RX MED GY IP 250 OP 250 PS 637: Performed by: STUDENT IN AN ORGANIZED HEALTH CARE EDUCATION/TRAINING PROGRAM

## 2021-01-20 PROCEDURE — 124N000002 HC R&B MH UMMC

## 2021-01-20 PROCEDURE — 250N000013 HC RX MED GY IP 250 OP 250 PS 637: Performed by: PSYCHIATRY & NEUROLOGY

## 2021-01-20 RX ORDER — LAMOTRIGINE 25 MG/1
25 TABLET ORAL DAILY
Status: DISCONTINUED | OUTPATIENT
Start: 2021-01-20 | End: 2021-02-03

## 2021-01-20 RX ADMIN — LEVONORGESTREL AND ETHINYL ESTRADIOL 1 TABLET: KIT at 11:19

## 2021-01-20 RX ADMIN — OLANZAPINE 5 MG: 5 TABLET, FILM COATED ORAL at 18:41

## 2021-01-20 RX ADMIN — LAMOTRIGINE 25 MG: 25 TABLET ORAL at 12:21

## 2021-01-20 RX ADMIN — ACETAMINOPHEN 650 MG: 325 TABLET, FILM COATED ORAL at 14:29

## 2021-01-20 RX ADMIN — GABAPENTIN 300 MG: 300 CAPSULE ORAL at 12:35

## 2021-01-20 RX ADMIN — ACETAMINOPHEN 650 MG: 325 TABLET, FILM COATED ORAL at 09:17

## 2021-01-20 RX ADMIN — LORAZEPAM 1 MG: 1 TABLET ORAL at 12:35

## 2021-01-20 RX ADMIN — OLANZAPINE 5 MG: 5 TABLET, FILM COATED ORAL at 09:21

## 2021-01-20 RX ADMIN — OLANZAPINE 5 MG: 5 TABLET, FILM COATED ORAL at 14:29

## 2021-01-20 ASSESSMENT — ACTIVITIES OF DAILY LIVING (ADL)
LAUNDRY: UNABLE TO COMPLETE
HYGIENE/GROOMING: INDEPENDENT
LAUNDRY: UNABLE TO COMPLETE
HYGIENE/GROOMING: INDEPENDENT
DRESS: INDEPENDENT
DRESS: SCRUBS (BEHAVIORAL HEALTH)
ORAL_HYGIENE: PROMPTS
ORAL_HYGIENE: INDEPENDENT

## 2021-01-20 NOTE — PROGRESS NOTES
"[facility], [place]   Psychiatric Progress Note  Hospital Day: 2     Video-Visit Details     Type of service:  Video Visit     Video Start Time (time video started): 11:14AM     Video End Time (time video stopped): 11:20AM    Originating Location (pt. Location): Other station 12     Distant Location (provider location): Provider remote location     Mode of Communication:  Video Conference via [employer]     Physician has received verbal consent for a Video Visit from the patient? Yes        Interim History:   The patient's care was discussed with the treatment team during the daily team meeting and/or staff's chart notes were reviewed. Alicia received a call from [facility] and was irritable, crying, yelling, and cursing at staff after the call. Continued to be labile with tense and irritable affect quickly switching to tearfulness. COWS in evening was 8 and 6 this morning. Continued to be agitated with yelling and cursing at staff. No physical aggression. Did not require restraint or seclusion. Slept and ate well. Refused Depakote but took afternoon and morning Zyprexa.     On interview, Alicia states that she has a migraine but declines any medication for it. When asked how she is feeling, she responds, \"I want Lamictal. I'm not taking Depakote.\" She initially declines to elaborate on what she dislikes about Depakote, instead yelling \"this is bullshit!\" She then states that she does not want weight gain from Depakote. When told that Lamictal is not effective for jackie and has a risk of life-threatening rash if not taken consistently, she responds, \"I haven't been manic while I've taken it for months.\" When told that Lamictal can be started today, she begins yelling \"I never stopped it!\" She then leaves her bed, ending the interview.          Medications:       fluticasone  1 puff Inhalation Daily     lamoTRIgine  25 mg Oral Daily     levonorgestrel-ethinyl estradiol  1 tablet Oral Daily     " nicotine  1 patch Transdermal Daily     nicotine   Transdermal Q8H     OLANZapine  5 mg Oral TID          Allergies:     Allergies   Allergen Reactions     Nkda [No Known Drug Allergies]           Labs:   No results found for this or any previous visit (from the past 24 hour(s)).       Psychiatric Examination:     /63   Pulse 73   Temp 98.2  F (36.8  C) (Tympanic)   Resp 16   SpO2 95%   Weight is 0 lbs 0 oz  There is no height or weight on file to calculate BMI.    Weight over time:  There were no vitals filed for this visit.    Orthostatic Vitals       Most Recent      Lying Orthostatic /63 01/20 0445    Lying Orthostatic Pulse (bpm) 73 01/20 0445            Cardiometabolic risk assessment. 01/20/21      Reviewed patient profile for cardiometabolic risk factors    Date taken /Value  REFERENCE RANGE   Abdominal Obesity  (Waist Circumference)   See nursing flowsheet Women ?35 in (88 cm)   Men ?40 in (102 cm)      Triglycerides  No results found for: TRIG    ?150 mg/dL (1.7 mmol/L) or current treatment for elevated triglycerides   HDL cholesterol  No results found for: HDL]   Women <50 mg/dL (1.3 mmol/L) in women or current treatment for low HDL cholesterol  Men <40 mg/dL (1 mmol/L) in men or current treatment for low HDL cholesterol     Fasting plasma glucose (FPG) Lab Results   Component Value Date    GLC 63 01/17/2021      FPG ?100 mg/dL (5.6 mmol/L) or treatment for elevated blood glucose   Blood pressure  BP Readings from Last 3 Encounters:   01/20/21 112/63   01/18/21 138/82   11/12/20 120/70    Blood pressure ?130/85 mmHg or treatment for elevated blood pressure   Family History  See family history     Appearance: awake, alert and poorly groomed  Attitude:  mostly uncooperative though answers a few brief questions  Eye Contact:  fair  Mood:  irritable  Affect:  intensity is heightened and labile, mood congruent  Speech:  clear, coherent and loud volume  Language: fluent and intact in  English  Psychomotor, Gait, Musculoskeletal:  no evidence of tardive dyskinesia, dystonia, or tics  Throught Process:  disorganized  Associations:  no loose associations  Thought Content:  unable to assess for suicidal or homicidal ideation given short interview  Insight:  limited  Judgement:  poor  Oriented to:  time, person, and place  Attention Span and Concentration:  limited  Recent and Remote Memory:  unable to determine  Fund of Knowledge:  Unable to determine         Precautions:     Behavioral Orders   Procedures     Assault precautions     Code 1 - Restrict to Unit     Elopement precautions     Fall precautions     Routine Programming     As clinically indicated     Routine Programming     As clinically indicated     Status 15     Every 15 minutes.     Status 15     Every 15 minutes.          Diagnoses:     Bipolar I Disorder, current episode manic  Amphetamine use disorder  Cannabis use disorder         Assessment & Plan:     Assessment and hospital summary:  This patient is a 25 year old woman with history of bipolar one disorder and polysubstance use (methamphetamine, alcohol, cocaine, heroin, cannabis) who presented to ED via EMS with agitation and aggression in context of methamphetamine and heroin use and likely medication nonadherence. Her symptoms of responding to internal stimuli, aggression, and disorganization are consistent with a manic episode with psychotic features with potential effects of amphetamine intoxication and opioid withdrawal. She has a history of multiple similar presentations in the ED recently but her symptoms have resolved more quickly, suggesting that her symptoms this time may be more due to her bipolar disorder than substance-induced. Alicia has a history of multiple similar presentations at prior hospitalizations. She has done well in these hospitalizations with an antipsychotic and sometimes a mood stabilizer. She has also required commitment and Carrillo for several of  these hospitalizations. Therefore, based on her history and current presentation, she would likely benefit from a mood stabilizer and an antipsychotic to target her manic symptoms, agitation, and psychosis.     Alicia continued to have agitation after admission and declined Depakote. Given her imminent risk to herself and others and poor insight into her mental health, a petition for commitment and Carrillo were sent. As she was declining Depakote and requesting PTA Lamictal, Depakote was stopped and Lamictal initiated. Discussed need for adherence and that Lamictal is not effective in jackie prevention. Alicia began taking Zyprexa 5mg TID two days after admission. Gabapentin PRN was started to target opioid withdrawal as she had low-normal blood pressures and this would therefore be preferred over clonidine for symptoms management.     Changes Today:  -Stop Depakote  -Start Lamictal 25mg daily    Target psychiatric symptoms and interventions:  # Bipolar I Disorder:  -Lamictal 25mg daily  -Zyprexa 5mg TID    -hydroxyzine 25 mg q4h prn for acute anxiety  -melatonin PRN insomnia  -Zyprexa 10 mg q2h prn for severe agitation  -Haldol, Ativan, Benadryl PRN severe agitation/aggression     Medical Problems and Treatments:  # Migraines:  -PTA sumatriptan PRN     #Opioid Withdrawal:  Given patient's low-normal blood pressure, will start gabapentin to use over clonidine.   -Gabapentin 300mg TID PRN opioid withdrawal symptoms  -PRN clonidine, Imodium, Zofran, Tylenol     - No other acute medical concerns     Behavioral/Psychological/Social:  - Encourage unit programming     Safety:  - Safety precautions include:   1:1 SIO for fall risk and aggression  Assault, elopement, and fall precautions.    Legal Status: 72 hour hold, Petitions for commitment and Carrillo have been filed    Disposition Plan   Reason for ongoing admission: poses an imminent risk to self, poses an imminent risk to others and is unable to care for self due to  severe psychosis or jackie  Discharge location: unknown at this time  Discharge Medications: not ordered  Follow-up Appointments: not scheduled    Entered by: Anny Schwarz on 1/20/2021 at 11:29 AM     Patient seen and discussed with attending physician, Dr. Reveles.    Anny Schwarz MD  Psychiatry PGY-4

## 2021-01-20 NOTE — PROGRESS NOTES
At 0925, COWS assessment was attempted with pt. Pt refused VS. COWS was unable to be completed. Without the pulse known, the COWS score reached was 5.     Pt refused her morning dose of Depakote and fluticasone.

## 2021-01-20 NOTE — PLAN OF CARE
"Pt appeared to sleep for approximately 6.5 hours. COWS score of 6 at 0445, no meds given. Pt requested PRN zyprexa and tylenol at this time but fell back asleep by the time writer returned with meds. Pt calling staff \"dumb fucking bitches,\" stating \"fuck you all I want to leave.\" Agitated and irritable. Mood is labile, pt would quickly go from being tense and angry to tearful. She did allow for VS to be taken and VSS. Will continue to monitor.   "

## 2021-01-20 NOTE — PLAN OF CARE
Personal Plan of Care    Reasons you are in the Hospital  1. Psychosis in the context of drug use/abuse  2. Medication non-adherence  3. Mood dysregulation    Goals for Discharge   1. Absence of psychosis  2. Residential chemical dependency treatment  3. Mood stabilization

## 2021-01-20 NOTE — PLAN OF CARE
"Nursing assessment completed. Pt presented as agitated, tense, angry and irratic. Pt refused some of her morning medications, VS and COWS assessment (see previous note.) Pt at first agreed to have her VS taken but then threw the BP cuff at the staff member and told the staff member \"get out!\"     After the pt spoke with her provider per telehealth, the pt came out of her room and demanded to speak to her provider again. She stated, \"I need to go home. I need to be discharged.\" Pt yelled at this writer, \"You're a fucking bitch\" when she was told the provider was meeting with another pt at the moment. The pt then went to her room and slammed her door and went to bed.    After a nap, the pt asked for and received two extra blankets and had her temperature in her room raised when she said that she was feeling cold. When pt asked, pt was also given an extra pillow. Pt became tearful about being in the hospital. Reassured and supported pt. Pt then allowed this writer to take her VS around 1225 /75   Pulse 113   Temp 98.2  F (36.8  C) (Tympanic)   Resp 16   SpO2 95% . Pt COWS score was 8.    PRNS: Pt c/o back pain and asked for and received  Tylenol at 0915. She declined a warm or cool pack. Pt was asleep 30 minutes later. Pt was administered Ativan 1 mg for agitation and gabapentin 300 mg for opioid withdrawal symptoms at 1245. Pt administered Tylenol at 1435 for headache. Pt sleeping again at 1515.    Pt was administered her new medication this afternoon, Lamictal 25 mg.    Overall, affect was tense and labile. Mood irritable and labile. Pt denied SI/SIB or urges to hurt others. Pt speech was pressured. Pt was not seen to be RIS. Pt slept on and off for much of the shift.     Pt ate 25% of her breakfast and 50% of her lunch. Pt drank approximately 720 ml of fluids.    After PRN of Ativan, pt was notably calmer, more cooperative, and no longer irratic.    "

## 2021-01-20 NOTE — PLAN OF CARE
Writer spoke with pre-petition screener at [facility] and was informed they are supporting the [facility] with Gerardo.  Court date is not scheduled as yet.    [facility] Pre-Petition Screener: Dante @ [phone]:  Fax: [phone]

## 2021-01-20 NOTE — PLAN OF CARE
Work Completed: Attended Team Meeting: Reviewed chart notes. Spoke with patients mother to provide update on patient status so far on the unit.     Discharge plan or goal:  TBD as civil commitment and Carrillo initiated                Barriers to discharge:  Acuity of symptoms and civil commitment initiated

## 2021-01-21 PROCEDURE — 99233 SBSQ HOSP IP/OBS HIGH 50: CPT | Mod: 95 | Performed by: PSYCHIATRY & NEUROLOGY

## 2021-01-21 PROCEDURE — 250N000013 HC RX MED GY IP 250 OP 250 PS 637: Performed by: STUDENT IN AN ORGANIZED HEALTH CARE EDUCATION/TRAINING PROGRAM

## 2021-01-21 PROCEDURE — 124N000002 HC R&B MH UMMC

## 2021-01-21 PROCEDURE — 250N000013 HC RX MED GY IP 250 OP 250 PS 637: Performed by: PSYCHIATRY & NEUROLOGY

## 2021-01-21 RX ADMIN — OLANZAPINE 5 MG: 5 TABLET, FILM COATED ORAL at 10:25

## 2021-01-21 RX ADMIN — ALBUTEROL SULFATE 2 PUFF: 90 AEROSOL, METERED RESPIRATORY (INHALATION) at 10:36

## 2021-01-21 RX ADMIN — ACETAMINOPHEN 650 MG: 325 TABLET, FILM COATED ORAL at 01:07

## 2021-01-21 RX ADMIN — LAMOTRIGINE 25 MG: 25 TABLET ORAL at 10:25

## 2021-01-21 RX ADMIN — OLANZAPINE 5 MG: 5 TABLET, FILM COATED ORAL at 17:40

## 2021-01-21 RX ADMIN — OLANZAPINE 10 MG: 10 TABLET, FILM COATED ORAL at 13:02

## 2021-01-21 RX ADMIN — LORAZEPAM 2 MG: 1 TABLET ORAL at 13:43

## 2021-01-21 RX ADMIN — LEVONORGESTREL AND ETHINYL ESTRADIOL 1 TABLET: KIT at 10:25

## 2021-01-21 RX ADMIN — OLANZAPINE 10 MG: 10 TABLET, FILM COATED ORAL at 01:07

## 2021-01-21 ASSESSMENT — ACTIVITIES OF DAILY LIVING (ADL)
ORAL_HYGIENE: INDEPENDENT
HYGIENE/GROOMING: INDEPENDENT
DRESS: SCRUBS (BEHAVIORAL HEALTH)

## 2021-01-21 NOTE — PLAN OF CARE
Work Completed: Attended Team Meeting: Reviewed chart notes: Patient was served her court paperwork  this afternoon.     Discharge plan or goal:  TBD as civil commitment and Carrillo initiated.                Barriers to discharge:  Acuity of symptoms. Patient is continuing to stabilize.

## 2021-01-21 NOTE — PLAN OF CARE
"Problem: Behavior Regulation Impairment (Disruptive Behavior)  Goal: Improved Impulse and Aggression Control (Disruptive Behavior)  Outcome: No Change     Nursing assessment completed. Pt presents as tense, agitated, and erratic. Pt affect blunted and tense. Pt denied SI/SIB and urges to hurt others. Pt denied AVH and was not seen responding to internal stimuli.     Pt laid in bed and slept on and off for much of the shift. At one pt, the verbalized wanting to take a shower; however, she went back to her room and went to bed. Pt verbalized wanting to be discharged and began to yell \"This fucking place sucks.\" When staff asked if she wanted to go to cd treatment she nodded and said \"yes.\" She then began to yell \"hurry it up, I don't want to waste time here anymore.\"    The pt's morning medications were identified for her and she took all of her morning medications except fluticasone and her nicotine patch. The pt asked for Zyprexa, and she needed to be reminded she just took a dose. The pt asked for and received her albuterol inhaler. At 1245 pt requested and received a PRN of Zyprexa 10 mg orally for agitation.      The pt took 2 showers this shift.    At 0145 pt came out of her room to ask to see her phone to get a phone number. When told by this writer she would need to ask for an available PA, the pt began yelling \"You fucking bitch! I don't know who my staff member is, I was never told!\" When this writer attempted to let her know any staff member could help her, she was yelling so loudly she could not hear this writer \"You fucking bitch!\" Pt accused this writer of lying to her. She approached this writer and got within 1 foot of this writer and was told to back away. Pt complied and then pt was erratic and moving around Pod 2 while yelling. With other staff assisting and after several attempts, this writer directed the pt back into her room. The pt was told she could not come out of her room yelling and she was " asked to stay in her room for 15 minutes to calm down. Pt was administered a PRN of Ativan 2 mg by another RN.    Pt received court paperwork on her court hold.

## 2021-01-21 NOTE — PLAN OF CARE
Problem: Behavioral Health Plan of Care  Goal: Plan of Care Review  Outcome: No Change     Problem: Behavior Regulation Impairment (Disruptive Behavior)  Goal: Improved Impulse and Aggression Control (Disruptive Behavior)  Outcome: No Change     Problem: Sleep Disturbance (Disruptive Behavior)  Goal: Improved Sleep (Disruptive Behavior)  Outcome: No Change   Pt was  asleep at the beginning of the shift. Pt woke up at around 0100H and requested for the lights to be put on so she could eat her dinner; (she had  dinner tray in her room). Pt then requested for zyprexa and tylenol which writer administered. Writer assessed for O2 97% RR 14 and P 71. Pt requested for an extra blanket and socks before going back to bed. Pt's mood was irritable and labile. Pt was crying in between conversation but declined having a reason. COWS scale is 2. Will continue to monitor.

## 2021-01-21 NOTE — PROGRESS NOTES
"[facility], [place]   Psychiatric Progress Note  Hospital Day: 3     Video-Visit Details     Type of service:  Video Visit     Video Start Time (time video started): 1017     Video End Time (time video stopped): 1029    Originating Location (pt. Location): Other station 12     Distant Location (provider location): Provider remote location     Mode of Communication:  Video Conference via [employer]     Physician has received verbal consent for a Video Visit from the patient? Yes        Interim History:   The patient's care was discussed with the treatment team during the daily team meeting and/or staff's chart notes were reviewed. Alicia was agitated intermittently thoughout the day shift yesterday. She is isolative to her room, and did appear to be responding to internal stimuli per chart review. She is angry and easily startled. Mood is labile. Affect is tense. She is requesting prn Zyprexa when agitated. Also given prn Ativan with relief. Petition for commitment supported. No evidence of significant opiate withdrawal.     On interview, Margarita was initially lying calmly in bed, though does become easily agitated and tearful. She appears restless. She says that she is feeling \"anxious sometimes about the future, the unknown,\" but otherwise denies bothersome mental health symptoms. She said repeatedly that she \"just wants to go home.\" She feels she can maintain sobriety with support of AA/NA meetings, but then confirms that she was using methamphetamine frequently (\"smoking a little bit every couple of days\") while attending meetings occasionally in the community. She does not feel she needs treatment because it is \"repetitive shit.\" She said that all she needs is \"positive things and positive people.\" She did have evidence of bruising on her right upper arm. When asked about this, she replied \"it was probably the .\" She denies any recent abuse. She denies other acute medical concerns. " "She denies IV substance use. Denies recent use of opiates despite positive drug screen. She endorsed paranoia, but said \"I don't want to talk about it.\" She does feel that there are people out to get her or after her. She denies SI, HI, AH, VH. She was focused on discharge. She was informed of commitment process and pending outcome. She had no other questions or concerns.          Medications:       fluticasone  1 puff Inhalation Daily     lamoTRIgine  25 mg Oral Daily     levonorgestrel-ethinyl estradiol  1 tablet Oral Daily     nicotine  1 patch Transdermal Daily     nicotine   Transdermal Q8H     OLANZapine  5 mg Oral TID          Allergies:     Allergies   Allergen Reactions     Nkda [No Known Drug Allergies]           Labs:   No results found for this or any previous visit (from the past 24 hour(s)).       Psychiatric Examination:     /75   Pulse 71   Temp 98.2  F (36.8  C) (Tympanic)   Resp 14   SpO2 97%   Weight is 0 lbs 0 oz  There is no height or weight on file to calculate BMI.    Weight over time:  There were no vitals filed for this visit.    Orthostatic Vitals       Most Recent      Lying Orthostatic /63 01/20 0445    Lying Orthostatic Pulse (bpm) 73 01/20 0445            Cardiometabolic risk assessment. 01/20/21      Reviewed patient profile for cardiometabolic risk factors    Date taken /Value  REFERENCE RANGE   Abdominal Obesity  (Waist Circumference)   See nursing flowsheet Women ?35 in (88 cm)   Men ?40 in (102 cm)      Triglycerides  No results found for: TRIG    ?150 mg/dL (1.7 mmol/L) or current treatment for elevated triglycerides   HDL cholesterol  No results found for: HDL]   Women <50 mg/dL (1.3 mmol/L) in women or current treatment for low HDL cholesterol  Men <40 mg/dL (1 mmol/L) in men or current treatment for low HDL cholesterol     Fasting plasma glucose (FPG) Lab Results   Component Value Date    GLC 63 01/17/2021      FPG ?100 mg/dL (5.6 mmol/L) or treatment for " elevated blood glucose   Blood pressure  BP Readings from Last 3 Encounters:   01/20/21 112/75   01/18/21 138/82   11/12/20 120/70    Blood pressure ?130/85 mmHg or treatment for elevated blood pressure   Family History  See family history     Appearance: awake, alert and poorly groomed  Attitude:  mostly uncooperative though answers a few brief questions  Eye Contact:  fair  Mood:  irritable  Affect:  intensity is heightened and labile, mood congruent  Speech:  clear, coherent and loud volume  Language: fluent and intact in English  Psychomotor, Gait, Musculoskeletal:  no evidence of tardive dyskinesia, dystonia, or tics  Throught Process:  disorganized  Associations:  no loose associations  Thought Content:  Denied SI, HI, AH, VH though endorsed paranoia  Insight:  limited  Judgement:  poor  Oriented to:  time, person, and place  Attention Span and Concentration:  limited  Recent and Remote Memory:  unable to determine  Fund of Knowledge:  Unable to determine         Precautions:     Behavioral Orders   Procedures     Assault precautions     Code 1 - Restrict to Unit     Elopement precautions     Fall precautions     Routine Programming     As clinically indicated     Status 15     Every 15 minutes.          Diagnoses:     Bipolar I Disorder, current episode manic  Amphetamine use disorder  Cannabis use disorder         Assessment & Plan:     Assessment and hospital summary:  This patient is a 25 year old woman with history of bipolar one disorder and polysubstance use (methamphetamine, alcohol, cocaine, heroin, cannabis) who presented to ED via EMS with agitation and aggression in context of methamphetamine and heroin use and likely medication nonadherence. Her symptoms of responding to internal stimuli, aggression, and disorganization are consistent with a manic episode with psychotic features with potential effects of amphetamine intoxication and opioid withdrawal. She has a history of multiple similar  presentations in the ED recently but her symptoms have resolved more quickly, suggesting that her symptoms this time may be more due to her bipolar disorder than substance-induced. Alicia has a history of multiple similar presentations at prior hospitalizations. She has done well in these hospitalizations with an antipsychotic and sometimes a mood stabilizer. She has also required commitment and Carrillo for several of these hospitalizations. Therefore, based on her history and current presentation, she would likely benefit from a mood stabilizer and an antipsychotic to target her manic symptoms, agitation, and psychosis.     Alicia continued to have agitation after admission and declined Depakote. Given her imminent risk to herself and others and poor insight into her mental health, a petition for commitment and Carrillo were sent. As she was declining Depakote and requesting PTA Lamictal, Depakote was stopped and Lamictal initiated. Discussed need for adherence and that Lamictal is not effective in jackie prevention. Alicia began taking Zyprexa 5mg TID two days after admission. Gabapentin PRN was started to target opioid withdrawal as she had low-normal blood pressures and this would therefore be preferred over clonidine for symptoms management.     Changes Today:  - Increase prn Zyprexa max to 60 mg from all sources over 24 hr period given frequent requests and ongoing evidence of psychosis, jackie, agitation. Will monitor closely for side effects and may consider optimizing scheduled Zyprexa if frequent prn use.     Target psychiatric symptoms and interventions:  # Bipolar I Disorder:  -Lamictal 25mg daily  -Zyprexa 5mg TID    -hydroxyzine 25 mg q4h prn for acute anxiety  -melatonin PRN insomnia  -Zyprexa 10 mg q2h prn for severe agitation  -Haldol, Ativan, Benadryl PRN severe agitation/aggression     Medical Problems and Treatments:  # Migraines:  -PTA sumatriptan PRN     #Opioid Withdrawal:  Given patient's  low-normal blood pressure, will start gabapentin to use over clonidine.   -Gabapentin 300mg TID PRN opioid withdrawal symptoms  -PRN clonidine, Imodium, Zofran, Tylenol  - Discontinue COWS given absence of significant withdrawal     - No other acute medical concerns     Behavioral/Psychological/Social:  - Encourage unit programming     Safety:  - Safety precautions include:   Assault, elopement, and fall precautions.    Legal Status: Court hold. Petition for MICD commitment and Carrillo supported through [employer].    Disposition Plan   Reason for ongoing admission: poses an imminent risk to self, poses an imminent risk to others and is unable to care for self due to severe psychosis or jackie  Discharge location: unknown at this time  Discharge Medications: not ordered  Follow-up Appointments: not scheduled    Entered by: Alicia Reveles on 1/20/2021 at 2:37 PM

## 2021-01-21 NOTE — PLAN OF CARE
"  Problem: Behavior Regulation Impairment (Disruptive Behavior)  Goal: Improved Impulse and Aggression Control (Disruptive Behavior)  Outcome: No Change    Patient isolative to room sleeping most of the shift, declines VS, patient appear to be responding to internal stimuli, rambling speech, poor eye contact; agitated and moving about swinging her arms, \"you're too slow, you're too slow, are you stupid?\" patient yells and storms off down the mitchell to the bathroom and enters another room unoccupied, angry, easily startled, \"don't you even,\" \"don't you even\" patient yells out toward staff member opening the bathroom door that is locked; eats 25% of dinner meal, two juice and popcorn/snack  PO2 96% Room air and pulse 111 per pulse oximeter, cows score of 4 at 6 pm, limited assessment as patient agitated and defensive or paranoid; evening scheduled zyprexa given. Will continue to monitor and allow space/time to rest.     "

## 2021-01-21 NOTE — PROGRESS NOTES
Patient's [facility] paperwork arrived on the unit.  Patient was served this paperwork. Patient is now on a District court Hold:  Preliminary Hearing is Monday, 1/25/21 @ 10:30am:  Final Hearing:  February 2, 2pm:  Radha Curran has been emailed this information.

## 2021-01-22 PROCEDURE — 250N000013 HC RX MED GY IP 250 OP 250 PS 637: Performed by: STUDENT IN AN ORGANIZED HEALTH CARE EDUCATION/TRAINING PROGRAM

## 2021-01-22 PROCEDURE — 124N000002 HC R&B MH UMMC

## 2021-01-22 PROCEDURE — 250N000013 HC RX MED GY IP 250 OP 250 PS 637: Performed by: PSYCHIATRY & NEUROLOGY

## 2021-01-22 PROCEDURE — 99233 SBSQ HOSP IP/OBS HIGH 50: CPT | Mod: 95 | Performed by: PSYCHIATRY & NEUROLOGY

## 2021-01-22 RX ORDER — LORAZEPAM 2 MG/ML
1-2 INJECTION INTRAMUSCULAR EVERY 4 HOURS PRN
Status: DISCONTINUED | OUTPATIENT
Start: 2021-01-22 | End: 2021-02-11

## 2021-01-22 RX ORDER — OLANZAPINE 10 MG/2ML
5 INJECTION, POWDER, FOR SOLUTION INTRAMUSCULAR 3 TIMES DAILY PRN
Status: DISCONTINUED | OUTPATIENT
Start: 2021-01-22 | End: 2021-03-24 | Stop reason: HOSPADM

## 2021-01-22 RX ORDER — OLANZAPINE 5 MG/1
5 TABLET ORAL 3 TIMES DAILY PRN
Status: DISCONTINUED | OUTPATIENT
Start: 2021-01-22 | End: 2021-03-24 | Stop reason: HOSPADM

## 2021-01-22 RX ORDER — LORAZEPAM 1 MG/1
1 TABLET ORAL EVERY 4 HOURS PRN
Status: DISCONTINUED | OUTPATIENT
Start: 2021-01-22 | End: 2021-02-11

## 2021-01-22 RX ADMIN — ALUMINUM HYDROXIDE, MAGNESIUM HYDROXIDE, AND SIMETHICONE 30 ML: 200; 200; 20 SUSPENSION ORAL at 13:18

## 2021-01-22 RX ADMIN — LORAZEPAM 1 MG: 1 TABLET ORAL at 12:24

## 2021-01-22 RX ADMIN — HYDROXYZINE HYDROCHLORIDE 50 MG: 25 TABLET, FILM COATED ORAL at 15:59

## 2021-01-22 RX ADMIN — NICOTINE POLACRILEX 4 MG: 2 GUM, CHEWING ORAL at 16:36

## 2021-01-22 RX ADMIN — LORAZEPAM 1 MG: 1 TABLET ORAL at 21:02

## 2021-01-22 RX ADMIN — LEVONORGESTREL AND ETHINYL ESTRADIOL 1 TABLET: KIT at 09:36

## 2021-01-22 RX ADMIN — CLONIDINE HYDROCHLORIDE 0.1 MG: 0.1 TABLET ORAL at 17:22

## 2021-01-22 RX ADMIN — DIPHENHYDRAMINE HYDROCHLORIDE 50 MG: 50 CAPSULE ORAL at 21:02

## 2021-01-22 RX ADMIN — OLANZAPINE 5 MG: 5 TABLET, FILM COATED ORAL at 15:25

## 2021-01-22 RX ADMIN — OLANZAPINE 5 MG: 5 TABLET, FILM COATED ORAL at 09:34

## 2021-01-22 RX ADMIN — ACETAMINOPHEN 650 MG: 325 TABLET, FILM COATED ORAL at 11:37

## 2021-01-22 RX ADMIN — OLANZAPINE 5 MG: 5 TABLET, FILM COATED ORAL at 19:24

## 2021-01-22 RX ADMIN — LAMOTRIGINE 25 MG: 25 TABLET ORAL at 09:34

## 2021-01-22 RX ADMIN — FLUTICASONE FUROATE 1 PUFF: 100 POWDER RESPIRATORY (INHALATION) at 13:18

## 2021-01-22 RX ADMIN — OLANZAPINE 5 MG: 5 TABLET, FILM COATED ORAL at 11:15

## 2021-01-22 ASSESSMENT — ACTIVITIES OF DAILY LIVING (ADL)
DRESS: INDEPENDENT
ORAL_HYGIENE: INDEPENDENT
HYGIENE/GROOMING: INDEPENDENT
ORAL_HYGIENE: INDEPENDENT
LAUNDRY: UNABLE TO COMPLETE
HYGIENE/GROOMING: INDEPENDENT
DRESS: SCRUBS (BEHAVIORAL HEALTH)

## 2021-01-22 NOTE — PLAN OF CARE
Pt spent much of the evening isolated/withdrawn to her room, or sleeping.  She presented as confused and sedated when interacting with RN writer.  She did not know the day or time.  She was frustrated with her dinner and became tearful.  She did not eat dinner, but was given an alternative. Patient given a new menu for tomorrow and filled it out, it was sent down.  She took her 1600 Zyprexa without incident.  Patient sleeping much of the rest of the evening and her HS Zyprexa was held.  She denied SI/SIB/HI but did not want answer many assessment questions.  She did not have any behavioral outburst or aggression.

## 2021-01-22 NOTE — PLAN OF CARE
Work Completed:  Attended Team Meeting: Reviewed chart notes: Spoke with [facility] [address] staff regarding Court Hold.     Discharge plan or goal: TBD as civil commitment/Carrillo initiated                Barriers to discharge:  Acuity of symptoms. Civil commitment initiated and preliminary hearing Monday 1/25/21 @ 10:30am

## 2021-01-22 NOTE — PROGRESS NOTES
"[facility], [place]   Psychiatric Progress Note  Hospital Day: 4     Video-Visit Details     Type of service:  Video Visit     Video Start Time (time video started): 1055     Video End Time (time video stopped): 1103    Originating Location (pt. Location): Other station 12     Distant Location (provider location): Provider remote location     Mode of Communication:  Video Conference via [employer]     Physician has received verbal consent for a Video Visit from the patient? Yes        Interim History:   The patient's care was discussed with the treatment team during the daily team meeting and/or staff's chart notes were reviewed. Patient is isolative to her room, and did appear to be responding to internal stimuli per chart review. She is angry and easily startled. Mood is labile. Affect is tense. She appeared sedated at times yesterday, and has frequently requested prns. She was sleeping through most of the evening. No episodes of seclusions or restraints. No significant behavioral concerns. Denied SI, SIB, and HI. Medication adherent.     On interview, patient was lying in bed though was awake. She appeared restless and was intermittently tearful and agitated. She said that \"being here is not conducive to my recovery. I need to be home!\" When I voiced my concerns about her returning home rather than to CD treatment, she said \"You're a dumb bitch!\" She became quite agitated. She said that she feels \"angry, bored, and anxious. What do you expect me to be like!? This is not daisies and lollipops you bitch!\" She is not interested in CD treatment. Given her repeated insults and unwillingness to discuss MH symptoms, interview was subsequently terminated. While writer was leaving her room, patient screamed \"You dumb fuckers are all retarded!\"         Medications:       fluticasone  1 puff Inhalation Daily     lamoTRIgine  25 mg Oral Daily     levonorgestrel-ethinyl estradiol  1 tablet Oral Daily "     OLANZapine  5 mg Oral TID          Allergies:     Allergies   Allergen Reactions     Nkda [No Known Drug Allergies]           Labs:   No results found for this or any previous visit (from the past 24 hour(s)).       Psychiatric Examination:     BP 94/59   Pulse 92   Temp 98.5  F (36.9  C) (Tympanic)   Resp 18   SpO2 97%   Weight is 0 lbs 0 oz  There is no height or weight on file to calculate BMI.    Weight over time:  There were no vitals filed for this visit.    Orthostatic Vitals       Most Recent      Lying Orthostatic /63 01/20 0445    Lying Orthostatic Pulse (bpm) 73 01/20 0445            Cardiometabolic risk assessment. 01/20/21      Reviewed patient profile for cardiometabolic risk factors    Date taken /Value  REFERENCE RANGE   Abdominal Obesity  (Waist Circumference)   See nursing flowsheet Women ?35 in (88 cm)   Men ?40 in (102 cm)      Triglycerides  No results found for: TRIG    ?150 mg/dL (1.7 mmol/L) or current treatment for elevated triglycerides   HDL cholesterol  No results found for: HDL]   Women <50 mg/dL (1.3 mmol/L) in women or current treatment for low HDL cholesterol  Men <40 mg/dL (1 mmol/L) in men or current treatment for low HDL cholesterol     Fasting plasma glucose (FPG) Lab Results   Component Value Date    GLC 63 01/17/2021      FPG ?100 mg/dL (5.6 mmol/L) or treatment for elevated blood glucose   Blood pressure  BP Readings from Last 3 Encounters:   01/22/21 94/59   01/18/21 138/82   11/12/20 120/70    Blood pressure ?130/85 mmHg or treatment for elevated blood pressure   Family History  See family history     Appearance: awake, alert and poorly groomed  Attitude:  mostly uncooperative though answers a few brief questions  Eye Contact:  fair  Mood:  irritable  Affect:  intensity is heightened and labile, mood congruent  Speech:  clear, coherent and loud volume  Language: fluent and intact in English  Psychomotor, Gait, Musculoskeletal:  no evidence of tardive dyskinesia,  dystonia, or tics  Throught Process:  disorganized  Associations:  no loose associations  Thought Content:  Denied SI, HI, AH, VH though endorsed paranoia  Insight:  limited  Judgement:  poor  Oriented to:  time, person, and place  Attention Span and Concentration:  limited  Recent and Remote Memory:  unable to determine  Fund of Knowledge:  Unable to determine         Precautions:     Behavioral Orders   Procedures     Assault precautions     Code 1 - Restrict to Unit     Elopement precautions     Fall precautions     Routine Programming     As clinically indicated     Status 15     Every 15 minutes.          Diagnoses:     Bipolar I Disorder, current episode manic  Amphetamine use disorder  Cannabis use disorder         Assessment & Plan:     Assessment and hospital summary:  This patient is a 25 year old woman with history of bipolar one disorder and polysubstance use (methamphetamine, alcohol, cocaine, heroin, cannabis) who presented to ED via EMS with agitation and aggression in context of methamphetamine and heroin use and likely medication nonadherence. Her symptoms of responding to internal stimuli, aggression, and disorganization are consistent with a manic episode with psychotic features with potential effects of amphetamine intoxication and opioid withdrawal. She has a history of multiple similar presentations in the ED recently but her symptoms have resolved more quickly, suggesting that her symptoms this time may be more due to her bipolar disorder than substance-induced. Alicia has a history of multiple similar presentations at prior hospitalizations. She has done well in these hospitalizations with an antipsychotic and sometimes a mood stabilizer. She has also required commitment and Carrillo for several of these hospitalizations. Therefore, based on her history and current presentation, she would likely benefit from a mood stabilizer and an antipsychotic to target her manic symptoms, agitation, and  psychosis.     Alicia continued to have agitation after admission and declined Depakote. Given her imminent risk to herself and others and poor insight into her mental health, a petition for commitment and Carrillo were sent. As she was declining Depakote and requesting PTA Lamictal, Depakote was stopped and Lamictal initiated. Discussed need for adherence and that Lamictal is not effective in jackie prevention. Alicia began taking Zyprexa 5mg TID two days after admission. Gabapentin PRN was started to target opioid withdrawal as she had low-normal blood pressures and this would therefore be preferred over clonidine for symptoms management.     Changes Today:  - Reduce Zyprexa prn dose from 10 to 5 mg due to evidence of sedation overnight, as well as soft pressures. Will consider increasing again if agitation worsens over the weekend.     Target psychiatric symptoms and interventions:  # Bipolar I Disorder:  -Lamictal 25mg daily  -Zyprexa 5mg TID    -hydroxyzine 25 mg q4h prn for acute anxiety  -melatonin PRN insomnia  -Zyprexa 10 mg q2h prn for severe agitation  -Haldol, Ativan, Benadryl PRN severe agitation/aggression     Medical Problems and Treatments:  # Migraines:  -PTA sumatriptan PRN     #Opioid Withdrawal:  Given patient's low-normal blood pressure, will start gabapentin to use over clonidine.   -Gabapentin 300mg TID PRN opioid withdrawal symptoms  -PRN clonidine, Imodium, Zofran, Tylenol  - Discontinue COWS given absence of significant withdrawal     - No other acute medical concerns     Behavioral/Psychological/Social:  - Encourage unit programming     Safety:  - Safety precautions include:   Assault, elopement, and fall precautions.    Legal Status: Court hold. Petition for MICD commitment and Carrillo supported through [facility].    Disposition Plan   Reason for ongoing admission: poses an imminent risk to self, poses an imminent risk to others and is unable to care for self due to severe psychosis or  jackie  Discharge location: unknown at this time  Discharge Medications: not ordered  Follow-up Appointments: not scheduled    Entered by: Alicia Reveles on 1/20/2021 at 1:44 PM

## 2021-01-22 NOTE — PLAN OF CARE
"  Problem: Mood Impairment (Disruptive Behavior)  Goal: Improved Mood Symptoms (Disruptive Behavior)  1/22/2021 1325 by Elsa Manzo, RN  Outcome: No Change   RN Assessment  Spent most of the day in bed today. Did not wake up until after 10 a.m. Notably agitated and restless when awake. Requested multiple PRN's including Zyprexa and Lorazepam, and reported minimal relief after administration. COWS 8 today, mostly due to agitation and restlessness. Mood irritable. Low frustration tolerance when asked questions. Denies thoughts death/dying or not being alive. Denies thoughts of suicide or non -suicidal self injury. Complained about \"nasty food\" that she has to eat in the hospital.  Presents disheveled and unkempt.   Patient's mother contacted the unit. Stated that she would be stopping by to  her keys as it was agreed previously. I attempted to confirm this with patient, who asked if she could speak with her mother before handing her the keys. I encouraged her to make a call.   Care plan was reviewed and updated today. Patient care was dicussed in team. Will continue with current plan and recommendations. Continue to monitor and reassess symptoms. Continue with current level of observation and precautions in place. See  notes for discharge planning.     "

## 2021-01-22 NOTE — PLAN OF CARE
Problem: Behavioral Health Plan of Care  Goal: Plan of Care Review  Outcome: Improving   Pt slept through the night with quite bathroom use. No behavioral concerns noted. No PRN administered tonight. Will continue to monitor.-*

## 2021-01-23 PROCEDURE — 250N000013 HC RX MED GY IP 250 OP 250 PS 637: Performed by: PSYCHIATRY & NEUROLOGY

## 2021-01-23 PROCEDURE — 124N000002 HC R&B MH UMMC

## 2021-01-23 PROCEDURE — 250N000013 HC RX MED GY IP 250 OP 250 PS 637: Performed by: STUDENT IN AN ORGANIZED HEALTH CARE EDUCATION/TRAINING PROGRAM

## 2021-01-23 RX ADMIN — LORAZEPAM 1 MG: 1 TABLET ORAL at 19:45

## 2021-01-23 RX ADMIN — HYDROXYZINE HYDROCHLORIDE 50 MG: 25 TABLET, FILM COATED ORAL at 01:16

## 2021-01-23 RX ADMIN — OLANZAPINE 5 MG: 5 TABLET, FILM COATED ORAL at 12:24

## 2021-01-23 RX ADMIN — FLUTICASONE FUROATE 1 PUFF: 100 POWDER RESPIRATORY (INHALATION) at 09:15

## 2021-01-23 RX ADMIN — ACETAMINOPHEN 650 MG: 325 TABLET, FILM COATED ORAL at 09:20

## 2021-01-23 RX ADMIN — LEVONORGESTREL AND ETHINYL ESTRADIOL 1 TABLET: KIT at 09:16

## 2021-01-23 RX ADMIN — LORAZEPAM 1 MG: 1 TABLET ORAL at 10:34

## 2021-01-23 RX ADMIN — OLANZAPINE 5 MG: 5 TABLET, FILM COATED ORAL at 19:00

## 2021-01-23 RX ADMIN — DIPHENHYDRAMINE HYDROCHLORIDE 50 MG: 50 CAPSULE ORAL at 19:45

## 2021-01-23 RX ADMIN — OLANZAPINE 5 MG: 5 TABLET, FILM COATED ORAL at 09:15

## 2021-01-23 RX ADMIN — LAMOTRIGINE 25 MG: 25 TABLET ORAL at 09:15

## 2021-01-23 RX ADMIN — NICOTINE POLACRILEX 4 MG: 2 GUM, CHEWING ORAL at 19:00

## 2021-01-23 RX ADMIN — OLANZAPINE 5 MG: 5 TABLET, FILM COATED ORAL at 14:47

## 2021-01-23 RX ADMIN — HYDROXYZINE HYDROCHLORIDE 50 MG: 25 TABLET, FILM COATED ORAL at 17:38

## 2021-01-23 RX ADMIN — NICOTINE POLACRILEX 4 MG: 2 GUM, CHEWING ORAL at 09:15

## 2021-01-23 ASSESSMENT — ACTIVITIES OF DAILY LIVING (ADL)
HYGIENE/GROOMING: HANDWASHING;INDEPENDENT
DRESS: SCRUBS (BEHAVIORAL HEALTH)
ORAL_HYGIENE: INDEPENDENT
HYGIENE/GROOMING: INDEPENDENT
LAUNDRY: WITH SUPERVISION
ORAL_HYGIENE: INDEPENDENT
DRESS: SCRUBS (BEHAVIORAL HEALTH);INDEPENDENT

## 2021-01-23 NOTE — PLAN OF CARE
Problem: Mood Impairment (Disruptive Behavior)  Goal: Improved Mood Symptoms (Disruptive Behavior)  Outcome: Improving   RN Assessment  Patient presented less anxious,restless, and agitated compare to yesterday. Still endorsed anxiety and requested PRN of Ativan and Zyprexa, both temporary effective. Mood notably irritable. Dismissive on approach. Affect blunted and irritable. Denies thoughts death/dying or not being alive. Denies thoughts of suicide or non -suicidal self injury. COWS -7 this shift. Room organization and cleanliness and personal hygiene remain poor. Complained of headache today and requested PRN Tylenol with reported relief.   Care plan was reviewed and updated today. Patient care was dicussed in team. Will continue with current plan and recommendations. Continue to monitor and reassess symptoms. Continue with current level of observation and precautions in place. See  notes for discharge planning.

## 2021-01-23 NOTE — PLAN OF CARE
Problem: Behavioral Health Plan of Care  Goal: Plan of Care Review  Outcome: No Change   Pt slept through the night without any concerns. Pt requested for a snack X2 which she ate and went back to bed. No PRN administered tonight.

## 2021-01-23 NOTE — PLAN OF CARE
Problem: Behavioral Health Plan of Care  Goal: Plan of Care Review  1/23/2021 0641 by Tawanna Gonzalez RN  Outcome: No Change   Pt was a sleep at the beginning of the shift. At 0118H Pt reported feeling anxious and requested for anxiety medication. Writer administered PRN hydroxyzine 50 mg. Pt requested for a snack X 2 and was able to sleep without any behavioral concerns. Pt slept for 6.5 hours.

## 2021-01-23 NOTE — PLAN OF CARE
"Pt presented as extremely restless and uncomfortable this evening.  She requested multiple PRNs for anxiety and what she described as withdrawal symptoms.  She is disheveled and her room is extremely untidy with food wrappers and dirty linens all over her floor.  Patient was encouraged to use the hamper and she did bring some linens to the hamper and placed them on top. She got multiple phone numbers out of her phone and made a few phone calls.  She did not have any verbal aggression or agitation with writer and was overall pleasant.  She had many requests and asked to be \"tucked-in.\"    Her BP was 85/45. Writer had patient drink a glass of water and retook her v/s and BP was 116/75.  She denied SI/SIB/HI or any psychotic symptoms.  She was medication compliant.    "

## 2021-01-24 LAB
ALBUMIN UR-MCNC: NEGATIVE MG/DL
APPEARANCE UR: CLEAR
BACTERIA #/AREA URNS HPF: ABNORMAL /HPF
BILIRUB UR QL STRIP: NEGATIVE
COLOR UR AUTO: YELLOW
GLUCOSE UR STRIP-MCNC: NEGATIVE MG/DL
HGB UR QL STRIP: NEGATIVE
KETONES UR STRIP-MCNC: NEGATIVE MG/DL
LEUKOCYTE ESTERASE UR QL STRIP: ABNORMAL
MUCOUS THREADS #/AREA URNS LPF: PRESENT /LPF
NITRATE UR QL: NEGATIVE
PH UR STRIP: 6 PH (ref 5–7)
RBC #/AREA URNS AUTO: 1 /HPF (ref 0–2)
SOURCE: ABNORMAL
SP GR UR STRIP: 1.02 (ref 1–1.03)
SQUAMOUS #/AREA URNS AUTO: 2 /HPF (ref 0–1)
UROBILINOGEN UR STRIP-MCNC: NORMAL MG/DL (ref 0–2)
WBC #/AREA URNS AUTO: 16 /HPF (ref 0–5)

## 2021-01-24 PROCEDURE — 250N000013 HC RX MED GY IP 250 OP 250 PS 637: Performed by: STUDENT IN AN ORGANIZED HEALTH CARE EDUCATION/TRAINING PROGRAM

## 2021-01-24 PROCEDURE — 250N000013 HC RX MED GY IP 250 OP 250 PS 637: Performed by: PSYCHIATRY & NEUROLOGY

## 2021-01-24 PROCEDURE — 81001 URINALYSIS AUTO W/SCOPE: CPT | Performed by: CLINICAL NURSE SPECIALIST

## 2021-01-24 PROCEDURE — 124N000002 HC R&B MH UMMC

## 2021-01-24 RX ORDER — SULFAMETHOXAZOLE/TRIMETHOPRIM 800-160 MG
1 TABLET ORAL 2 TIMES DAILY
Status: COMPLETED | OUTPATIENT
Start: 2021-01-24 | End: 2021-01-27

## 2021-01-24 RX ADMIN — LORAZEPAM 1 MG: 1 TABLET ORAL at 09:47

## 2021-01-24 RX ADMIN — DIPHENHYDRAMINE HYDROCHLORIDE 50 MG: 50 CAPSULE ORAL at 19:28

## 2021-01-24 RX ADMIN — LORAZEPAM 1 MG: 1 TABLET ORAL at 14:44

## 2021-01-24 RX ADMIN — NICOTINE POLACRILEX 4 MG: 2 GUM, CHEWING ORAL at 18:44

## 2021-01-24 RX ADMIN — OLANZAPINE 5 MG: 5 TABLET, FILM COATED ORAL at 13:12

## 2021-01-24 RX ADMIN — NICOTINE POLACRILEX 4 MG: 2 GUM, CHEWING ORAL at 09:15

## 2021-01-24 RX ADMIN — LORAZEPAM 1 MG: 1 TABLET ORAL at 19:28

## 2021-01-24 RX ADMIN — NICOTINE POLACRILEX 4 MG: 2 GUM, CHEWING ORAL at 13:13

## 2021-01-24 RX ADMIN — LEVONORGESTREL AND ETHINYL ESTRADIOL 1 TABLET: KIT at 09:15

## 2021-01-24 RX ADMIN — NICOTINE POLACRILEX 4 MG: 2 GUM, CHEWING ORAL at 16:08

## 2021-01-24 RX ADMIN — OLANZAPINE 5 MG: 5 TABLET, FILM COATED ORAL at 09:15

## 2021-01-24 RX ADMIN — LAMOTRIGINE 25 MG: 25 TABLET ORAL at 09:15

## 2021-01-24 RX ADMIN — SULFAMETHOXAZOLE AND TRIMETHOPRIM 1 TABLET: 800; 160 TABLET ORAL at 21:08

## 2021-01-24 RX ADMIN — OLANZAPINE 5 MG: 5 TABLET, FILM COATED ORAL at 19:02

## 2021-01-24 RX ADMIN — FLUTICASONE FUROATE 1 PUFF: 100 POWDER RESPIRATORY (INHALATION) at 09:15

## 2021-01-24 RX ADMIN — HYDROXYZINE HYDROCHLORIDE 50 MG: 25 TABLET, FILM COATED ORAL at 16:31

## 2021-01-24 ASSESSMENT — ACTIVITIES OF DAILY LIVING (ADL)
DRESS: SCRUBS (BEHAVIORAL HEALTH);INDEPENDENT
DRESS: SCRUBS (BEHAVIORAL HEALTH)
ORAL_HYGIENE: INDEPENDENT
LAUNDRY: WITH SUPERVISION
HYGIENE/GROOMING: INDEPENDENT
HYGIENE/GROOMING: HANDWASHING;INDEPENDENT
ORAL_HYGIENE: INDEPENDENT

## 2021-01-24 NOTE — PLAN OF CARE
Pt in bed sleeping at start of shift. Breathing quiet and unlabored. Appeared to be sleeping for 3.75 hours during the night.     Pt requested and was given a snack and then immediately laye back down.    On FALL, ASSAULT, and ELOPEMENT  precautions in addition to Single Room order , with no related events occurring this shift.     Will continue to monitor and assess.     Problem: Sleep Disturbance (Disruptive Behavior)  Goal: Improved Sleep (Disruptive Behavior)  Outcome: Declining

## 2021-01-24 NOTE — PLAN OF CARE
Problem: Behavioral Health Plan of Care  Goal: Plan of Care Review  Outcome: Improving   RN Assessment   Patient spent majority of the day in her room, napping. Was intermittently visible in the milieu and even conversed with other patients. Ate both meals.  Continues to endorse significant anxiety and reports partial response to medications given PRN.  Presents fidgety and restless. Hygiene and room organization remain poor. Mood irritable but improved from last assessment. Denies thoughts death/dying or not being alive. Denies thoughts of suicide or non -suicidal self injury.  Inquired about court process and possible outcome. Education was provided.   Care plan was reviewed and updated today. Patient care was dicussed in team. Will continue with current plan and recommendations. Continue to monitor and reassess symptoms. Continue with current level of observation and precautions in place. See  notes for discharge planning.

## 2021-01-24 NOTE — PLAN OF CARE
Pt presented as quite restless this evening.  She has difficulty sitting or laying down.  She made multiple statements about being uncomfortable and wanting to  leave.  She requested PRN medications consistently through out the evening and was irritable when she was told that she could not have more medications and that we did not have Suboxone available for her.  If her needs are not immediately met she finds another staff to make requests to.  She has poor insight regarding her behaviors PTA and has extremely low frustration tolerance.  She showered this evening, but did not comb her hair.  Her room continues to be extremely disorganized.  She denied SI/SIB/HI or an psychotic symptoms.  She was medication compliant.

## 2021-01-25 PROCEDURE — 250N000013 HC RX MED GY IP 250 OP 250 PS 637: Performed by: STUDENT IN AN ORGANIZED HEALTH CARE EDUCATION/TRAINING PROGRAM

## 2021-01-25 PROCEDURE — 124N000002 HC R&B MH UMMC

## 2021-01-25 PROCEDURE — 99233 SBSQ HOSP IP/OBS HIGH 50: CPT | Mod: 95 | Performed by: PSYCHIATRY & NEUROLOGY

## 2021-01-25 PROCEDURE — 250N000013 HC RX MED GY IP 250 OP 250 PS 637: Performed by: PSYCHIATRY & NEUROLOGY

## 2021-01-25 RX ADMIN — NICOTINE POLACRILEX 4 MG: 2 GUM, CHEWING ORAL at 10:51

## 2021-01-25 RX ADMIN — OLANZAPINE 5 MG: 5 TABLET, FILM COATED ORAL at 12:10

## 2021-01-25 RX ADMIN — LORAZEPAM 1 MG: 1 TABLET ORAL at 17:00

## 2021-01-25 RX ADMIN — HALOPERIDOL 5 MG: 5 TABLET ORAL at 17:00

## 2021-01-25 RX ADMIN — DIPHENHYDRAMINE HYDROCHLORIDE 50 MG: 50 CAPSULE ORAL at 16:14

## 2021-01-25 RX ADMIN — ACETAMINOPHEN 650 MG: 325 TABLET, FILM COATED ORAL at 13:48

## 2021-01-25 RX ADMIN — NICOTINE POLACRILEX 4 MG: 2 GUM, CHEWING ORAL at 20:38

## 2021-01-25 RX ADMIN — OLANZAPINE 5 MG: 5 TABLET, FILM COATED ORAL at 13:48

## 2021-01-25 RX ADMIN — OLANZAPINE 5 MG: 5 TABLET, FILM COATED ORAL at 20:35

## 2021-01-25 RX ADMIN — LEVONORGESTREL AND ETHINYL ESTRADIOL 1 TABLET: KIT at 08:38

## 2021-01-25 RX ADMIN — FLUTICASONE FUROATE 1 PUFF: 100 POWDER RESPIRATORY (INHALATION) at 08:38

## 2021-01-25 RX ADMIN — SULFAMETHOXAZOLE AND TRIMETHOPRIM 1 TABLET: 800; 160 TABLET ORAL at 20:35

## 2021-01-25 RX ADMIN — DIPHENHYDRAMINE HYDROCHLORIDE 50 MG: 50 CAPSULE ORAL at 20:38

## 2021-01-25 RX ADMIN — LAMOTRIGINE 25 MG: 25 TABLET ORAL at 08:38

## 2021-01-25 RX ADMIN — HYDROXYZINE HYDROCHLORIDE 50 MG: 25 TABLET, FILM COATED ORAL at 18:21

## 2021-01-25 RX ADMIN — SULFAMETHOXAZOLE AND TRIMETHOPRIM 1 TABLET: 800; 160 TABLET ORAL at 08:38

## 2021-01-25 RX ADMIN — NICOTINE POLACRILEX 4 MG: 2 GUM, CHEWING ORAL at 09:26

## 2021-01-25 RX ADMIN — MELATONIN TAB 3 MG 3 MG: 3 TAB at 20:35

## 2021-01-25 RX ADMIN — LORAZEPAM 1 MG: 1 TABLET ORAL at 10:51

## 2021-01-25 RX ADMIN — NICOTINE POLACRILEX 4 MG: 2 GUM, CHEWING ORAL at 13:48

## 2021-01-25 RX ADMIN — NICOTINE POLACRILEX 4 MG: 2 GUM, CHEWING ORAL at 16:14

## 2021-01-25 RX ADMIN — OLANZAPINE 5 MG: 5 TABLET, FILM COATED ORAL at 08:38

## 2021-01-25 ASSESSMENT — ACTIVITIES OF DAILY LIVING (ADL)
ORAL_HYGIENE: INDEPENDENT
ORAL_HYGIENE: INDEPENDENT
HYGIENE/GROOMING: INDEPENDENT
DRESS: SCRUBS (BEHAVIORAL HEALTH)
DRESS: SCRUBS (BEHAVIORAL HEALTH);INDEPENDENT
HYGIENE/GROOMING: INDEPENDENT

## 2021-01-25 NOTE — PROGRESS NOTES
"Pt given PRN olanzapine for agitation at 1210. She had come to the nursing window reporting anxiety. When RN writer attempted to ask her clarifying questions about her symptoms she became increasingly upset. She began yelling things like, \"you stupid motherfucker, give me a PRN,\" and \"I bet you suck fidencio on the side.\" She was very animated stomping her feet in the hallway and waving her hands. Pt took the oral medication without issue.   "

## 2021-01-25 NOTE — PROGRESS NOTES
"[facility], [place]   Psychiatric Progress Note  Hospital Day: 7     Video-Visit Details     Type of service:  Video Visit     Video Start Time (time video started): 1250     Video End Time (time video stopped): 1253    Originating Location (pt. Location): Other station 12     Distant Location (provider location): Provider remote location     Mode of Communication:  Video Conference via [employer]     Physician has received verbal consent for a Video Visit from the patient? Yes        Interim History:   The patient's care was discussed with the treatment team during the daily team meeting and/or staff's chart notes were reviewed. Patient remains agitated at times. She is medication seeking, requesting frequent prns for management of emotional dysregulation. She has very poor frustration tolerance. Patient presents as irritable, disorganized, and intermittently agitated with poor insight into her mental and chemical health relapse. This morning, she was observed singing in the lounge. She is being treated for UTI, and is accepting of treatment.     On interview, patient was lying in bed. She was difficult to awaken. She said that she feels \"tired.\" She denies SI, SIB, and HI. Denies medication side effects. Feels that medications are helping her, but didn't elaborate. States that she will go to CD treatment, and then fell asleep again. She had no questions or concerns for this writer.          Medications:       fluticasone  1 puff Inhalation Daily     lamoTRIgine  25 mg Oral Daily     levonorgestrel-ethinyl estradiol  1 tablet Oral Daily     OLANZapine  5 mg Oral TID     sulfamethoxazole-trimethoprim  1 tablet Oral BID          Allergies:     Allergies   Allergen Reactions     Nkda [No Known Drug Allergies]           Labs:     Recent Results (from the past 24 hour(s))   UA with Microscopic    Collection Time: 01/24/21  6:00 PM   Result Value Ref Range    Color Urine Yellow     Appearance " Urine Clear     Glucose Urine Negative NEG^Negative mg/dL    Bilirubin Urine Negative NEG^Negative    Ketones Urine Negative NEG^Negative mg/dL    Specific Gravity Urine 1.019 1.003 - 1.035    Blood Urine Negative NEG^Negative    pH Urine 6.0 5.0 - 7.0 pH    Protein Albumin Urine Negative NEG^Negative mg/dL    Urobilinogen mg/dL Normal 0.0 - 2.0 mg/dL    Nitrite Urine Negative NEG^Negative    Leukocyte Esterase Urine Moderate (A) NEG^Negative    Source Midstream Urine     WBC Urine 16 (H) 0 - 5 /HPF    RBC Urine 1 0 - 2 /HPF    Bacteria Urine Moderate (A) NEG^Negative /HPF    Squamous Epithelial /HPF Urine 2 (H) 0 - 1 /HPF    Mucous Urine Present (A) NEG^Negative /LPF          Psychiatric Examination:     /73   Pulse 87   Temp 99.3  F (37.4  C) (Tympanic)   Resp 18   SpO2 97%   Weight is 0 lbs 0 oz  There is no height or weight on file to calculate BMI.    Weight over time:  There were no vitals filed for this visit.    Orthostatic Vitals       Most Recent      Lying Orthostatic /63 01/20 0445    Lying Orthostatic Pulse (bpm) 73 01/20 0445            Cardiometabolic risk assessment. 01/20/21      Reviewed patient profile for cardiometabolic risk factors    Date taken /Value  REFERENCE RANGE   Abdominal Obesity  (Waist Circumference)   See nursing flowsheet Women ?35 in (88 cm)   Men ?40 in (102 cm)      Triglycerides  No results found for: TRIG    ?150 mg/dL (1.7 mmol/L) or current treatment for elevated triglycerides   HDL cholesterol  No results found for: HDL]   Women <50 mg/dL (1.3 mmol/L) in women or current treatment for low HDL cholesterol  Men <40 mg/dL (1 mmol/L) in men or current treatment for low HDL cholesterol     Fasting plasma glucose (FPG) Lab Results   Component Value Date    GLC 63 01/17/2021      FPG ?100 mg/dL (5.6 mmol/L) or treatment for elevated blood glucose   Blood pressure  BP Readings from Last 3 Encounters:   01/24/21 115/73   01/18/21 138/82   11/12/20 120/70    Blood  pressure ?130/85 mmHg or treatment for elevated blood pressure   Family History  See family history     Appearance: poorly groomed, disheveled, lying in bed.   Attitude:  mostly uncooperative though answers a few brief questions  Eye Contact:  poor  Mood:  irritable, though less so c/t Friday  Affect:  intensity is heightened and labile, mood congruent  Speech:  clear, coherent and loud volume  Language: fluent and intact in English  Psychomotor, Gait, Musculoskeletal:  no evidence of tardive dyskinesia, dystonia, or tics  Throught Process:  Linear though difficult to assess due to sedation.   Associations:  no loose associations  Thought Content:  Denied SI, HI, AH, VH though endorsed paranoia  Insight:  limited  Judgement:  poor  Oriented to:  time, person, and place  Attention Span and Concentration:  limited  Recent and Remote Memory:  unable to determine  Fund of Knowledge:  Unable to determine         Precautions:     Behavioral Orders   Procedures     Assault precautions     Code 1 - Restrict to Unit     Elopement precautions     Fall precautions     Routine Programming     As clinically indicated     Status 15     Every 15 minutes.          Diagnoses:     Bipolar I Disorder, current episode manic  Amphetamine use disorder  Cannabis use disorder         Assessment & Plan:     Assessment and hospital summary:  This patient is a 25 year old woman with history of bipolar one disorder and polysubstance use (methamphetamine, alcohol, cocaine, heroin, cannabis) who presented to ED via EMS with agitation and aggression in context of methamphetamine and heroin use and likely medication nonadherence. Her symptoms of responding to internal stimuli, aggression, and disorganization are consistent with a manic episode with psychotic features with potential effects of amphetamine intoxication and opioid withdrawal. She has a history of multiple similar presentations in the ED recently but her symptoms have resolved more  quickly, suggesting that her symptoms this time may be more due to her bipolar disorder than substance-induced. Alicia has a history of multiple similar presentations at prior hospitalizations. She has done well in these hospitalizations with an antipsychotic and sometimes a mood stabilizer. She has also required commitment and Carrillo for several of these hospitalizations. Therefore, based on her history and current presentation, she would likely benefit from a mood stabilizer and an antipsychotic to target her manic symptoms, agitation, and psychosis.     Alicia continued to have agitation after admission and declined Depakote. Given her imminent risk to herself and others and poor insight into her mental health, a petition for commitment and Carrillo were sent. As she was declining Depakote and requesting PTA Lamictal, Depakote was stopped and Lamictal initiated. Discussed need for adherence and that Lamictal is not effective in jackie prevention. Alicia began taking Zyprexa 5mg TID two days after admission. Gabapentin PRN was started to target opioid withdrawal as she had low-normal blood pressures and this would therefore be preferred over clonidine for symptoms management.     Changes Today:  - No changes will be made today.     Target psychiatric symptoms and interventions:  # Bipolar I Disorder:  -Lamictal 25mg daily  -Zyprexa 5mg TID    -hydroxyzine 25 mg q4h prn for acute anxiety  -melatonin PRN insomnia  -Zyprexa 10 mg q2h prn for severe agitation  -Haldol, Ativan, Benadryl PRN severe agitation/aggression     Medical Problems and Treatments:  # UTI:  - Resume Bactrim    # Migraines:  -PTA sumatriptan PRN     #Opioid Withdrawal:  Given patient's low-normal blood pressure, will start gabapentin to use over clonidine.   -Gabapentin 300mg TID PRN opioid withdrawal symptoms  -PRN clonidine, Imodium, Zofran, Tylenol  - Discontinued COWS given absence of significant withdrawal     - No other acute medical  concerns     Behavioral/Psychological/Social:  - Encourage unit programming     Safety:  - Safety precautions include:   Assault, elopement, and fall precautions.    Legal Status: Court hold. Petition for MICD commitment and Carrillo supported through [employer].    Disposition Plan   Reason for ongoing admission: poses an imminent risk to self, poses an imminent risk to others and is unable to care for self due to severe psychosis or jackie  Discharge location: unknown at this time  Discharge Medications: not ordered  Follow-up Appointments: not scheduled    Entered by: Alicia Reveles on 1/25/2021 at 3:18 PM

## 2021-01-25 NOTE — PLAN OF CARE
Pt awake and received snack at start of shift. Breathing quiet and unlabored.     Appeared to be sleeping for 6 hours during the night.     Pt on FALL, ASSAULT, and ELOPEMENT  precautions in addition to single room order. Any related events noted above.     Will continue to monitor and assess.   Problem: Sleep Disturbance (Disruptive Behavior)  Goal: Improved Sleep (Disruptive Behavior)  Outcome: No Change

## 2021-01-25 NOTE — PROVIDER NOTIFICATION
"   01/25/21 1710   Seclusion or Restraint Order   Length of Order 4   Order Obtained Yes   Attending Physician Notified Yes   Attending Physician's Name Loren   Pt became agitated toward Art therapist and started swearing and calling therapist a \"stupid bitch.\"  Staff intervened and redirected patient to her room.  Patient given PRN Haldol and Ativan PO.  Patient asked to remain in her room until she could calm down.  Patient continued to escalate and started threatening RN writer in addition to other RN.  Patient flipping off RN writer saying, \"I'll fight you, you fucking bitch!\"  Patient posturing and approaching RN writer.  Staff intervened and code 21 called.  Patient walked to seclusion, saying \"Do you know who I am? I know you're scared of me!\"  Discontinuation criteria explained to patient.  Provider notified and order placed.  SIO staff monitoring patient.  Face to face completed by second RN.   "

## 2021-01-25 NOTE — PROVIDER NOTIFICATION
"   01/25/21 1710   Seclusion or Restraint Order   In Person Face to Face Assessment Conducted Yes-Eval of pt's immediate situation, reaction to intervention, complete review of systems assessment, behavioral assessment & review/assessment of hx, drugs & meds, recent labs, etc, behavioral condition, need to continue/terminate restraint/seclusion   Patient Experienced No adverse physical outcome from seclusion/restraint initiation   Continuation of Seclus/Restraint indicated at this time Yes     In person face-to-face assessment conducted. Patient's medical history, progress notes, and recent lab and VS were reviewed.     Patient does not appear to have sustained any injuries during code and during seclusion. Discontinuation criteria reviewed with patient twice as patient was yelling loudly while writer was talking. Patient does not show evidence of learning, yelling \"Fuck you cunt! Get out of here!\"    When asked what happened prior to seclusion patient stated \"I don't know, nothing.\" When asked if patient remembered making threatening statements and posturing at staff patient said, \"No, not to staff, but alone, in my room to the shadows.\" Patient then said she threatened staff because, \"They said they don't care about my life.\" When asked if patient now remembers making threatening statements, patient yelled, shouting profanities, yelling at writer to get out of room. \"No one fucking listens to me you're all dumb cunts!\"     Patient on continuous observation for safety.     Continuing of seclusion indicated at this time due to patient presently not calm, yelling loudly at writer.     Patient is currently lying on the mattress on the floor covering body with leg wrap. Patient has access to urinal and water.      On-call provider Dr. Pimentel updated with status.        "

## 2021-01-25 NOTE — PLAN OF CARE
Pt continues to report discomfort and restlessness. She was overall cooperative with staff and peers but became quite agitated on the phone with her mother about how no one would come pick her up. She has constant requests and has difficulty remaining still or focused on anything.  Staff noted that patient was using bathroom frequently.  Provider was notified and UA was ordered and completed.  UA came back abnormal and IM was contacted.  IM order Bactrim DS.  Patient requested multiple PRNS for anxiety and restlessness.  She was given PRN Benadryl and Ativan. She denies SI/SIB/HI or any psychotic symptoms.  She was medication compliant.

## 2021-01-25 NOTE — PLAN OF CARE
"Patient presents as irritable, disorganized, and intermittently agitated with poor insight into her mental and chemical health relapse.  She has been verbally aggressive and demanding for much of this shift.  She endorses anxiety, feeling agitated, and notes that it was very stressful for her to endure mental health court this morning.  Shortly after her virtual hearing this morning, at 10:51, Alicia requested and was given a dose of PRN Ativan 1 mg for c/o persistent anxiety, later reporting minimal relief.  While preparing the Ativan for administration, Alicia was overheard making threatening comments about the  in her commitment hearing this morning: \"I'd love to wipe that smirk off her face with my fist!\".  She was at the medication room window about 1.5 hours later c/o mounting agitation, and asking for a dose of PRN Zyprexa.  Since RN writer was off the unit at this time, another male nurse assisted her with this request.  While preparing the PRN Zyprexa for administration, Alicia reportedly was yelling insults at the nurse, including but not limited to: \"I bet you suck some fidencio on the side!\".  When RN writer returned to the unit and attempted to assess Alicia for the effectiveness of the PRN Zyprexa, Alicia was sleeping comfortably in her room.  Alicia refused to cooperate with vital signs assessment this AM.  She denies any acute physical concerns at this time.  She is aware that antibiotic therapy is currently underway to address her UTI.  She was compliant with her scheduled medications this AM, and no adverse side effects have been reported by Alicia or observed by RN writer.    Addendum 15:05  RN writer provided patient with a copy of civil commitment order and the associated paperwork that arrived on the unit this afternoon.  She was sleeping at the time and did not respond to my voice, so the paperwork was placed on her nightstand for her review.  "

## 2021-01-25 NOTE — PLAN OF CARE
Work Completed:  Attended Team Meeting; Reviewed chart notes: Attempted to meet with patient to discuss disposition plan but patient was too dysregulated.     Discharge plan or goal:  TBD Civil Commitment/Carrillo initiated                Barriers to discharge:  Acuity of symptoms: Chemical Dependency Residential Treatment: Patient is continuing to stabilize.

## 2021-01-26 PROCEDURE — 250N000013 HC RX MED GY IP 250 OP 250 PS 637: Performed by: STUDENT IN AN ORGANIZED HEALTH CARE EDUCATION/TRAINING PROGRAM

## 2021-01-26 PROCEDURE — 250N000013 HC RX MED GY IP 250 OP 250 PS 637: Performed by: PSYCHIATRY & NEUROLOGY

## 2021-01-26 PROCEDURE — 99233 SBSQ HOSP IP/OBS HIGH 50: CPT | Mod: 95 | Performed by: PSYCHIATRY & NEUROLOGY

## 2021-01-26 PROCEDURE — 124N000002 HC R&B MH UMMC

## 2021-01-26 RX ORDER — OLANZAPINE 5 MG/1
10 TABLET, ORALLY DISINTEGRATING ORAL 2 TIMES DAILY
Status: DISCONTINUED | OUTPATIENT
Start: 2021-01-26 | End: 2021-02-05

## 2021-01-26 RX ORDER — OLANZAPINE 10 MG/2ML
10 INJECTION, POWDER, FOR SOLUTION INTRAMUSCULAR 2 TIMES DAILY
Status: DISCONTINUED | OUTPATIENT
Start: 2021-01-26 | End: 2021-02-05

## 2021-01-26 RX ORDER — OLANZAPINE 5 MG/1
5 TABLET, ORALLY DISINTEGRATING ORAL ONCE
Status: COMPLETED | OUTPATIENT
Start: 2021-01-26 | End: 2021-01-26

## 2021-01-26 RX ORDER — OLANZAPINE 10 MG/2ML
10 INJECTION, POWDER, FOR SOLUTION INTRAMUSCULAR 2 TIMES DAILY
Status: DISCONTINUED | OUTPATIENT
Start: 2021-01-26 | End: 2021-01-26

## 2021-01-26 RX ORDER — OLANZAPINE 5 MG/1
10 TABLET, ORALLY DISINTEGRATING ORAL 2 TIMES DAILY
Status: DISCONTINUED | OUTPATIENT
Start: 2021-01-26 | End: 2021-01-26

## 2021-01-26 RX ORDER — PROPRANOLOL HYDROCHLORIDE 10 MG/1
10 TABLET ORAL 3 TIMES DAILY
Status: DISCONTINUED | OUTPATIENT
Start: 2021-01-26 | End: 2021-01-29

## 2021-01-26 RX ADMIN — LORAZEPAM 1 MG: 1 TABLET ORAL at 09:38

## 2021-01-26 RX ADMIN — LAMOTRIGINE 25 MG: 25 TABLET ORAL at 07:50

## 2021-01-26 RX ADMIN — SULFAMETHOXAZOLE AND TRIMETHOPRIM 1 TABLET: 800; 160 TABLET ORAL at 07:50

## 2021-01-26 RX ADMIN — FLUTICASONE FUROATE 1 PUFF: 100 POWDER RESPIRATORY (INHALATION) at 07:50

## 2021-01-26 RX ADMIN — NICOTINE POLACRILEX 4 MG: 2 GUM, CHEWING ORAL at 14:10

## 2021-01-26 RX ADMIN — OLANZAPINE 5 MG: 5 TABLET, FILM COATED ORAL at 07:50

## 2021-01-26 RX ADMIN — NICOTINE POLACRILEX 4 MG: 2 GUM, CHEWING ORAL at 18:09

## 2021-01-26 RX ADMIN — SULFAMETHOXAZOLE AND TRIMETHOPRIM 1 TABLET: 800; 160 TABLET ORAL at 19:42

## 2021-01-26 RX ADMIN — LORAZEPAM 1 MG: 1 TABLET ORAL at 14:08

## 2021-01-26 RX ADMIN — OLANZAPINE 5 MG: 5 TABLET, ORALLY DISINTEGRATING ORAL at 08:03

## 2021-01-26 RX ADMIN — PROPRANOLOL HYDROCHLORIDE 10 MG: 10 TABLET ORAL at 19:42

## 2021-01-26 RX ADMIN — HYDROXYZINE HYDROCHLORIDE 50 MG: 25 TABLET, FILM COATED ORAL at 17:48

## 2021-01-26 RX ADMIN — OLANZAPINE 10 MG: 5 TABLET, ORALLY DISINTEGRATING ORAL at 19:41

## 2021-01-26 RX ADMIN — NICOTINE POLACRILEX 4 MG: 2 GUM, CHEWING ORAL at 08:03

## 2021-01-26 RX ADMIN — PROPRANOLOL HYDROCHLORIDE 10 MG: 10 TABLET ORAL at 15:47

## 2021-01-26 RX ADMIN — NICOTINE POLACRILEX 4 MG: 2 GUM, CHEWING ORAL at 09:38

## 2021-01-26 RX ADMIN — LEVONORGESTREL AND ETHINYL ESTRADIOL 1 TABLET: KIT at 07:50

## 2021-01-26 RX ADMIN — NICOTINE POLACRILEX 4 MG: 2 GUM, CHEWING ORAL at 15:53

## 2021-01-26 RX ADMIN — LORAZEPAM 1 MG: 1 TABLET ORAL at 18:57

## 2021-01-26 ASSESSMENT — ACTIVITIES OF DAILY LIVING (ADL)
LAUNDRY: UNABLE TO COMPLETE
HYGIENE/GROOMING: INDEPENDENT
DRESS: SCRUBS (BEHAVIORAL HEALTH)
DRESS: SCRUBS (BEHAVIORAL HEALTH)
HYGIENE/GROOMING: INDEPENDENT
LAUNDRY: UNABLE TO COMPLETE
ORAL_HYGIENE: INDEPENDENT
ORAL_HYGIENE: INDEPENDENT

## 2021-01-26 NOTE — PLAN OF CARE
Problem: Behavior Regulation Impairment (Disruptive Behavior)  Goal: Improved Impulse and Aggression Control (Disruptive Behavior)  Outcome: No Change  Flowsheets (Taken 1/26/2021 1420)  Mutually Determined Action Steps (Improved Impulse and Aggression Control): (requests PRN medication) other (see comments)     SI/Self harm: pt denies  Aggression/agitation/HI: pt became loud and verbally hostile toward provider on polycom. Pt minimally responsive to redirection. Pt appears physically agitated and restless at times.   AVH: none  Sleep: 6.25 hours overnight, nap during the day.  PRN Med: PRN lorazepam 1 mg PO at 0930 and 1408  Medication AE: none  Physical Complaints/Issues: none  I & O: eating and drinking well   ADLs: independent   Vitals:  WNL and stable  COVID 19 Assessment: pt tested negative on admission and currently does not show signs of COVID19 infection  Milieu Participation: minimal. Pt paces the hallway at times, wears headphones and keeps to self mostly. Pt minimally social with one peer who has since discharged. No groups, eats meals in her room.   Behavior: Pt is in and out of her room throughout the day. Pt reports high anxiety and does appear to be very anxious. Pt is overall polite toward writer, though became very hostile toward provider on polycom.

## 2021-01-26 NOTE — PROGRESS NOTES
"[facility], [place]   Psychiatric Progress Note  Hospital Day: 8     Video-Visit Details     Type of service:  Video Visit     Video Start Time (time video started): 1402     Video End Time (time video stopped): 1414    Originating Location (pt. Location): Other station 12     Distant Location (provider location): Provider remote location     Mode of Communication:  Video Conference via [employer]     Physician has received verbal consent for a Video Visit from the patient? Yes        Interim History:   The patient's care was discussed with the treatment team during the daily team meeting and/or staff's chart notes were reviewed. Last evening, patient became agitated toward art therapist (because she could not request a specific song as she was not engaged in group), began swearing and calling therapist a \"stupid bitch.\" Staff intervened and redirected patient to her room.  Patient given PRN Haldol and Ativan PO.  Patient asked to remain in her room until she could calm down.  Patient continued to escalate and started threatening RNs.  Patient flipping off RN saying, \"I'll fight you, you fucking bitch!\"  Patient posturing and approaching RN.  Staff intervened and code 21 called.  Patient walked to seclusion, saying \"Do you know who I am? I know you're scared of me!\" Patient is easily agitated when her needs are not immediately met. She denied SI/SIB/HI or any psychotic symptoms.      On interview, patient was lying in bed. Initially, she was quite agitated, referring to this writer as a \"stupid dumb bitch. You're really pissing me off right now with these questions!\" She said that she was in seclusion for 30 minutes \"because I was threatened by a nurse who told me that they dont give a fuck about me and it pissed me off! Woopty fuckin doo!\" She also added that she was feeling \"angry and hungry\" just prior to her aggressive behavior. She did not take any accountability for her actions. She " "added that staff are \"not doing jody shit!\" including this writer. She said that the most bothersome mental health symptom she is experiencing is restlessness. She does describe a need to move or pace, and appears quite restless during our interviews. She also reports anxiety because of \"fear of the unknown, and being stuck here! I want to leave!\" Discussed option of adding propranolol, and patient agreed after discussing R/B/A. Discussed plan for her to go to CD treatment. She said that she completed programs at [facility], [facility], and , and would be willing to go to any of these programs. She plans to complete the programming. She denies SI, HI, SIB, noting that she has not experienced any of these thoughts \"for over one week now!\" She denies symptoms of psychosis, though continues to feel that someone may be after her or out to get her. She did not wish to elaborate. She feels safe in the hospital.          Medications:       fluticasone  1 puff Inhalation Daily     lamoTRIgine  25 mg Oral Daily     levonorgestrel-ethinyl estradiol  1 tablet Oral Daily     OLANZapine  5 mg Oral TID     sulfamethoxazole-trimethoprim  1 tablet Oral BID          Allergies:     Allergies   Allergen Reactions     Nkda [No Known Drug Allergies]           Labs:     No results found for this or any previous visit (from the past 24 hour(s)).       Psychiatric Examination:     /73   Pulse 87   Temp 99.3  F (37.4  C) (Tympanic)   Resp 18   SpO2 97%   Weight is 0 lbs 0 oz  There is no height or weight on file to calculate BMI.    Weight over time:  There were no vitals filed for this visit.    Orthostatic Vitals       Most Recent      Lying Orthostatic /63 01/20 0445    Lying Orthostatic Pulse (bpm) 73 01/20 0445            Cardiometabolic risk assessment. 01/20/21      Reviewed patient profile for cardiometabolic risk factors    Date taken /Value  REFERENCE RANGE   Abdominal Obesity  (Waist Circumference)   See " nursing flowsheet Women ?35 in (88 cm)   Men ?40 in (102 cm)      Triglycerides  No results found for: TRIG    ?150 mg/dL (1.7 mmol/L) or current treatment for elevated triglycerides   HDL cholesterol  No results found for: HDL]   Women <50 mg/dL (1.3 mmol/L) in women or current treatment for low HDL cholesterol  Men <40 mg/dL (1 mmol/L) in men or current treatment for low HDL cholesterol     Fasting plasma glucose (FPG) Lab Results   Component Value Date    GLC 63 01/17/2021      FPG ?100 mg/dL (5.6 mmol/L) or treatment for elevated blood glucose   Blood pressure  BP Readings from Last 3 Encounters:   01/24/21 115/73   01/18/21 138/82   11/12/20 120/70    Blood pressure ?130/85 mmHg or treatment for elevated blood pressure   Family History  See family history     Appearance: poorly groomed, disheveled, lying in bed.   Attitude:  mostly uncooperative though answers a few brief questions  Eye Contact:  poor  Mood:  Agitated, hostile  Affect:  intensity is heightened and labile, mood congruent  Speech:  clear, coherent and loud volume  Language: fluent and intact in English  Psychomotor, Gait, Musculoskeletal:  no evidence of tardive dyskinesia, dystonia, or tics. Appeared quite restless, frequently moving around in her bed.   Throught Process:  Linear, goal oriented, illogical  Associations:  no loose associations  Thought Content:  Denied SI, HI, AH, VH though endorsed paranoia  Insight:  limited  Judgement:  poor  Oriented to:  time, person, and place  Attention Span and Concentration:  limited  Recent and Remote Memory:  Impaired wrt events leading to hospitalization  Fund of Knowledge:  appropriate         Precautions:     Behavioral Orders   Procedures     Assault precautions     Code 1 - Restrict to Unit     Elopement precautions     Fall precautions     Routine Programming     As clinically indicated     Status 15     Every 15 minutes.          Diagnoses:     Bipolar I Disorder, current episode  manic  Amphetamine use disorder  Cannabis use disorder         Assessment & Plan:     Assessment and hospital summary:  This patient is a 25 year old woman with history of bipolar one disorder and polysubstance use (methamphetamine, alcohol, cocaine, heroin, cannabis) who presented to ED via EMS with agitation and aggression in context of methamphetamine and heroin use and likely medication nonadherence. Her symptoms of responding to internal stimuli, aggression, and disorganization are consistent with a manic episode with psychotic features with potential effects of amphetamine intoxication and opioid withdrawal. She has a history of multiple similar presentations in the ED recently but her symptoms have resolved more quickly, suggesting that her symptoms this time may be more due to her bipolar disorder than substance-induced. Alicia has a history of multiple similar presentations at prior hospitalizations. She has done well in these hospitalizations with an antipsychotic and sometimes a mood stabilizer. She has also required commitment and Carrillo for several of these hospitalizations. Therefore, based on her history and current presentation, she would likely benefit from a mood stabilizer and an antipsychotic to target her manic symptoms, agitation, and psychosis.     Alicia continued to have agitation after admission and declined Depakote. Given her imminent risk to herself and others and poor insight into her mental health, a petition for commitment and Carrillo were sent. As she was declining Depakote and requesting PTA Lamictal, Depakote was stopped and Lamictal initiated. Discussed need for adherence and that Lamictal is not effective in jackie prevention. Alicia began taking Zyprexa 5mg TID two days after admission. Gabapentin PRN was started to target opioid withdrawal as she had low-normal blood pressures and this would therefore be preferred over clonidine for symptoms management.     On 1/25, patient  postured toward staff in an aggressive manner. She required seclusion and emergency neuroleptic medications. Psychiatric emergency was declared on 1/26, and Zyprexa was increased, and backed up with IM. Propranolol was also added to target anxiety and suspected akathisia.    Changes Today:   - No changes will be made today.     Target psychiatric symptoms and interventions:  # Bipolar I Disorder:  -Lamictal 25mg daily (initiated on 1/20 per pt request). She understands that this is not first line treatment for jackie.   -Increase Zyprexa to 10 mg BID. Declare psychiatric emergency as patient postured toward staff with the intent to assault them last evening on 1/25. She may not decline medication.   -Repeatedly discussed recommendation to start non-neuroleptic mood stabilizer, though patient has consistently declined due to concerns about weight gain.   -Add propranolol 10 mg TID to target akathisia and anxiety  -hydroxyzine 25 mg q4h prn for acute anxiety  -melatonin PRN insomnia  -Zyprexa 10 mg q2h prn for severe agitation  -Haldol, Ativan, Benadryl PRN severe agitation/aggression     Medical Problems and Treatments:  # UTI:  - Resume Bactrim (initiated on 1/24)     # Migraines:  -PTA sumatriptan PRN     #Opioid Withdrawal, resolved:   -Gabapentin 300mg TID PRN opioid withdrawal symptoms  -PRN clonidine, Imodium, Zofran, Tylenol  - Discontinued COWS given absence of significant withdrawal     - No other acute medical concerns     Behavioral/Psychological/Social:  - Encourage unit programming     Safety:  - Safety precautions include:   Assault, elopement, and fall precautions.    Legal Status: Court hold. Petition for MICD commitment and Carrillo supported through [facility]. Recommending full commitment with Carrillo at this time.     Disposition Plan   Reason for ongoing admission: poses an imminent risk to self, poses an imminent risk to others and is unable to care for self due to severe psychosis or  jackie  Discharge location: unknown at this time. May need to pursue CARE given severity of patient's MICD conditions, poor insight/judgment, ongoing impulsivity and intermittent severe agitation. Patient has extremely poor frustration tolerance, and at this time is a high elopement risk if discharging to unlocked MICD program. Will continue to reassess with plan to pursue least restrictive, safest dispo option.   Discharge Medications: not ordered  Follow-up Appointments: not scheduled    Entered by: Alicia Reveles on 1/26/2021 at 7:46 AM

## 2021-01-26 NOTE — PLAN OF CARE
Problem: Sleep Disturbance (Disruptive Behavior)  Goal: Improved Sleep (Disruptive Behavior)  Outcome: Improving   Pt slept through the night. No behavioral concerns noted or reported tonight. Pt slept for 6.25 hours.

## 2021-01-26 NOTE — PLAN OF CARE
Pt had a difficult evening.  She was placed in seclusion for threaten to harm staff.  (see previous note).  She continues to make very frequent requests for medications, reporting that she's restless or uncomfortable. She had difficulty following staff direction and was easily agitated when her needs were not immediately met.  She was medication compliant.  She denied SI/SIB/HI or any psychotic symptoms.

## 2021-01-26 NOTE — PROVIDER NOTIFICATION
01/25/21 1818   Debriefing   Debriefing DO   Does patient understand why the event happened? Patient refuses to answer   Does patient agree to safe behaviors? Yes   What can we do differently so this doesn't happen again? Patient refuses to answer   Plan of care reviewed and modified Yes   Patient ambrocio for safety.  She agrees to be respectful and follow staff direction.  She agreed to remain in her room for an hour to show staff that she can remain in control.  Discontinuation of seclusion is appropriate at this time.

## 2021-01-26 NOTE — PLAN OF CARE
"  Problem: General Rehab Plan of Care  Goal: Therapeutic Recreation/Music Therapy Goal (Art Therapy)  Description: The patient and/or their representative will achieve their patient-specific goals related to the plan of care.  The patient-specific goals include: emotional expression  Outcome: No Change   Pt did not join AT group this evening but did enter the Pod 1 lounge while walking the halls several times during this group. Towards the end of group pt took a seat in a chair across from the TV and said she just wanted to listen to the music that author was playing from a tablet. Pt then requested a specific song for author to play and author told pt that song requests were available for those pts that were attending group. This upset pt and pt became argumentative and started demanding that someone give her a chair to join group. Pt then began posturing and called author a \"stupid bitch.\" Other staff members intervened at this point and pt was redirected out of group. Pt continued to yell obscenities directed at author while pt stood outside of Pod 1. Author ended group early at this point for the safety of other pts in group.   "

## 2021-01-27 PROCEDURE — 99232 SBSQ HOSP IP/OBS MODERATE 35: CPT | Mod: 95 | Performed by: PSYCHIATRY & NEUROLOGY

## 2021-01-27 PROCEDURE — 250N000013 HC RX MED GY IP 250 OP 250 PS 637: Performed by: STUDENT IN AN ORGANIZED HEALTH CARE EDUCATION/TRAINING PROGRAM

## 2021-01-27 PROCEDURE — 124N000002 HC R&B MH UMMC

## 2021-01-27 PROCEDURE — 250N000013 HC RX MED GY IP 250 OP 250 PS 637: Performed by: PSYCHIATRY & NEUROLOGY

## 2021-01-27 RX ADMIN — ALUMINUM HYDROXIDE, MAGNESIUM HYDROXIDE, AND SIMETHICONE 30 ML: 200; 200; 20 SUSPENSION ORAL at 13:08

## 2021-01-27 RX ADMIN — PROPRANOLOL HYDROCHLORIDE 10 MG: 10 TABLET ORAL at 07:39

## 2021-01-27 RX ADMIN — LORAZEPAM 1 MG: 1 TABLET ORAL at 18:20

## 2021-01-27 RX ADMIN — HYDROXYZINE HYDROCHLORIDE 50 MG: 25 TABLET, FILM COATED ORAL at 12:08

## 2021-01-27 RX ADMIN — PROPRANOLOL HYDROCHLORIDE 10 MG: 10 TABLET ORAL at 13:08

## 2021-01-27 RX ADMIN — OLANZAPINE 10 MG: 5 TABLET, ORALLY DISINTEGRATING ORAL at 19:38

## 2021-01-27 RX ADMIN — NICOTINE POLACRILEX 4 MG: 2 GUM, CHEWING ORAL at 19:43

## 2021-01-27 RX ADMIN — LAMOTRIGINE 25 MG: 25 TABLET ORAL at 07:39

## 2021-01-27 RX ADMIN — NICOTINE POLACRILEX 4 MG: 2 GUM, CHEWING ORAL at 18:20

## 2021-01-27 RX ADMIN — HYDROXYZINE HYDROCHLORIDE 50 MG: 25 TABLET, FILM COATED ORAL at 04:37

## 2021-01-27 RX ADMIN — SULFAMETHOXAZOLE AND TRIMETHOPRIM 1 TABLET: 800; 160 TABLET ORAL at 07:39

## 2021-01-27 RX ADMIN — NICOTINE POLACRILEX 4 MG: 2 GUM, CHEWING ORAL at 07:54

## 2021-01-27 RX ADMIN — LEVONORGESTREL AND ETHINYL ESTRADIOL 1 TABLET: KIT at 07:39

## 2021-01-27 RX ADMIN — OLANZAPINE 10 MG: 5 TABLET, ORALLY DISINTEGRATING ORAL at 07:39

## 2021-01-27 RX ADMIN — LORAZEPAM 1 MG: 1 TABLET ORAL at 09:37

## 2021-01-27 RX ADMIN — FLUTICASONE FUROATE 1 PUFF: 100 POWDER RESPIRATORY (INHALATION) at 07:39

## 2021-01-27 RX ADMIN — NICOTINE POLACRILEX 4 MG: 2 GUM, CHEWING ORAL at 12:07

## 2021-01-27 RX ADMIN — MELATONIN TAB 3 MG 3 MG: 3 TAB at 19:38

## 2021-01-27 RX ADMIN — GABAPENTIN 300 MG: 300 CAPSULE ORAL at 16:30

## 2021-01-27 RX ADMIN — NICOTINE POLACRILEX 4 MG: 2 GUM, CHEWING ORAL at 09:27

## 2021-01-27 RX ADMIN — NICOTINE POLACRILEX 4 MG: 2 GUM, CHEWING ORAL at 15:39

## 2021-01-27 RX ADMIN — NICOTINE POLACRILEX 4 MG: 2 GUM, CHEWING ORAL at 16:31

## 2021-01-27 RX ADMIN — PROPRANOLOL HYDROCHLORIDE 10 MG: 10 TABLET ORAL at 19:38

## 2021-01-27 ASSESSMENT — ACTIVITIES OF DAILY LIVING (ADL)
LAUNDRY: UNABLE TO COMPLETE
HYGIENE/GROOMING: SHOWER
DRESS: SCRUBS (BEHAVIORAL HEALTH);INDEPENDENT
ORAL_HYGIENE: INDEPENDENT
DRESS: SCRUBS (BEHAVIORAL HEALTH)
LAUNDRY: UNABLE TO COMPLETE
ORAL_HYGIENE: INDEPENDENT
HYGIENE/GROOMING: HANDWASHING;SHOWER;INDEPENDENT

## 2021-01-27 NOTE — PROGRESS NOTES
[facility], [place]   Psychiatric Progress Note  Hospital Day: 9        Interim History:   The patient's care was discussed with the treatment team during the daily team meeting and/or staff's chart notes were reviewed.  Staff report patient has continued to appear restless, pacing in the hallway, untidy, taking medications as prescribed including propranolol started yesterday with minimal relief noted. Outburst noted when talking on phone last night otherwise behavior was more appropriate on unit, no acute events overnight.     Upon interview, the patient was lying in bed trying to fall asleep, reports she is feeling tired, is still somewhat restless but was able to get some sleep. Tolerating medications, denies side effects other than restlessness. Denies any rashes noted with starting lamotrigine. Denies SI or HI, denies AVH. Reporting some soreness in legs/hips and discussed this may be due to her pacing on unit. Declined ongoing urinary symptoms. Requested that interview be terminated so that she could return to sleeping. No additional concerns at this time.          Medications:       - Psychiatric Emergency -   Does not apply See Admin Instructions     fluticasone  1 puff Inhalation Daily     lamoTRIgine  25 mg Oral Daily     levonorgestrel-ethinyl estradiol  1 tablet Oral Daily     OLANZapine zydis  10 mg Oral BID    Or     OLANZapine  10 mg Intramuscular BID     propranolol  10 mg Oral TID          Allergies:     Allergies   Allergen Reactions     Nkda [No Known Drug Allergies]           Labs:   No results found for this or any previous visit (from the past 48 hour(s)).       Psychiatric Examination:     /68   Pulse 87   Temp 98  F (36.7  C) (Tympanic)   Resp 18   SpO2 99%   Weight is 0 lbs 0 oz  There is no height or weight on file to calculate BMI.    Orthostatic Vitals     None          Appearance: awake, alert and untidy, laying in bed, appears  comfortable  Attitude:  somewhat cooperative  Eye Contact:  fair  Mood:  anxious  Affect:  mood congruent and intensity is blunted  Speech:  clear, coherent and normal prosody  Language: fluent and intact in English  Psychomotor, Gait, Musculoskeletal:  no evidence of tardive dyskinesia, dystonia, or tics, patient appeared to be comfortable laying still during interview  Thought Process:  linear and goal oriented  Associations:  no loose associations  Thought Content:  no evidence of suicidal ideation or homicidal ideation and denies AVH, no overtly paranoid statements made today  Insight:  limited  Judgement:  limited  Oriented to:  time, person, and place  Attention Span and Concentration:  fair  Recent and Remote Memory:  improving, still limited around events leading to hospitalization   Fund of Knowledge:  appropriate    Clinical Global Impressions  First:  Considering your total clinical experience with this particular patient population, how severe are the patient's symptoms at this time?: 6 (01/21/21 1437)  Compared to the patient's condition at the START of treatment, this patient's condition is: 3 (01/21/21 1437)  Most recent:  Considering your total clinical experience with this particular patient population, how severe are the patient's symptoms at this time?: 6 (01/21/21 1437)  Compared to the patient's condition at the START of treatment, this patient's condition is: 3 (01/21/21 1437)           Precautions:     Behavioral Orders   Procedures     Assault precautions     Code 1 - Restrict to Unit     Elopement precautions     Fall precautions     Routine Programming     As clinically indicated     Status 15     Every 15 minutes.          Diagnoses:   Bipolar I Disorder, current episode manic  Amphetamine use disorder  Cannabis use disorder         Assessment & Plan:   Assessment and hospital summary:  This patient is a 25 year old woman with history of bipolar one disorder and polysubstance use  (methamphetamine, alcohol, cocaine, heroin, cannabis) who presented to ED via EMS with agitation and aggression in context of methamphetamine and heroin use and likely medication nonadherence. Her symptoms of responding to internal stimuli, aggression, and disorganization are consistent with a manic episode with psychotic features with potential effects of amphetamine intoxication and opioid withdrawal. She has a history of multiple similar presentations in the ED recently but her symptoms have resolved more quickly, suggesting that her symptoms this time may be more due to her bipolar disorder than substance-induced. Alicia has a history of multiple similar presentations at prior hospitalizations. She has done well in these hospitalizations with an antipsychotic and sometimes a mood stabilizer. She has also required commitment and Carrillo for several of these hospitalizations. Therefore, based on her history and current presentation, she would likely benefit from a mood stabilizer and an antipsychotic to target her manic symptoms, agitation, and psychosis.      Alicia continued to have agitation after admission and declined Depakote. Given her imminent risk to herself and others and poor insight into her mental health, a petition for commitment and Carrillo were sent. As she was declining Depakote and requesting PTA Lamictal, Depakote was stopped and Lamictal initiated. Discussed need for adherence and that Lamictal is not effective in jackie prevention. Alicia began taking Zyprexa 5mg TID two days after admission. Gabapentin PRN was started to target opioid withdrawal as she had low-normal blood pressures and this would therefore be preferred over clonidine for symptoms management.      On 1/25, patient postured toward staff in an aggressive manner. She required seclusion and emergency neuroleptic medications. Psychiatric emergency was declared on 1/26, and Zyprexa was increased, and backed up with IM. Propranolol  was also added to target anxiety and suspected akathisia.       Psychiatric treatment/inteventions:  Medications:   # Bipolar I Disorder:  -continue Lamictal 25mg daily (initiated on 1/20 per pt request). She understands that this is not first line treatment for jackie.   -continue Zyprexa 10 mg BID PO or IM.  Psychiatric emergency declared 1/26 as patient postured toward staff with the intent to assault them last evening on 1/25. She may not decline medication.   -continue to discuss recommendation to start non-neuroleptic mood stabilizer outside of lamotrigine that would be first line for jackie, though patient has consistently declined due to concerns about weight gain.   -continue propranolol 10 mg TID to target akathisia and anxiety  -continue hydroxyzine 25 mg q4h prn for acute anxiety  -continue melatonin PRN insomnia  -continue Zyprexa 10 mg q2h prn for severe agitation  -continue Haldol, Ativan, Benadryl PRN severe agitation/aggression     Laboratory/Imaging: no new labs ordered today, previous labs reviewed     Patient will be treated in therapeutic milieu with appropriate individual and group therapies as described.     Medical treatment/interventions:  Medical concerns:   # UTI:  -pt denying any further urinary symptoms   - Continue Bactrim (initiated on 1/24)      # Migraines:  -continue PTA sumatriptan PRN     #Opioid Withdrawal, appears resolved:   -continue Gabapentin 300mg TID PRN opioid withdrawal symptoms  -continue PRN clonidine, Imodium, Zofran, Tylenol  -Discontinued COWS 1/22 given absence of significant withdrawal        Because of behavior leading to a seclusion or restraint episode on 1/25, we have made the following change to the treatment plan: psychiatric emergency was declared 1/26 and pts Zyprexa was increased and backed up with IM. Team petitioning for commitment and Carrillo.       This note was created by undersigned using a Dragon dictation system. All typing errors or contextual  distortion are unintentional and software inherent.     Disposition Plan   Reason for ongoing admission: poses an imminent risk to self, poses an imminent risk to others and is unable to care for self due to severe psychosis or jackie  Discharge location: TBD, may pursue CARE given pts poor insight/judgement and severity of symptoms  Discharge Medications: not ordered  Follow-up Appointments: not scheduled  Legal Status: court hold  Entered by: Genevieve Bustamante on 1/27/2021 at 7:44 AM       Video-Visit Details    Type of service:  Video Visit    Video Start Time (time video started): 1043    Video End Time (time video stopped): 1050    Originating Location (pt. Location): [address]    Distant Location (provider location): Provider remote location    Mode of Communication:  Video Conference via [employer]    Physician has received verbal consent for a Video Visit from the patient? Yes    Genevieve Bustamante, DO

## 2021-01-27 NOTE — PLAN OF CARE
Patient oriented to person, place. Affect tense, irritable, labile, tearful at times. Mood anxious, frustrated, angry, depressed. She endorsed feeling physically uncomfortable and restless with frequent getting up and pacing, possible indicative of akithisia. Was started on scheduled TID propanolol. No improvement in restlessness after morning dose given. Patient continued to express anxiety and distress with hospitalization and court process. Prn lorazepam 1 mg given at 0938 am with moderate relief. Prn hydroxyzine 50 mg given at 1208 pm with minimal improvement. Dr. Bustamante paged to inform. No SI or self harm ideation this shift. Became tearful after reading a card that arrived for her on the unit from her mother. Appetite greatly increased. Ate a full sandwich, chips and cheese mid-morning after eating a full breakfast. Dietary consult ordered. She showered today, but still appears slightly disheveled. Reported some abdominal discomfort and loose stool x 1 today. Prn maalox given. On bactrim for UTI and still having some urinary frequency but denies other urinary sx. She was given a weighted blanket to help with restlessness. She was then able to sleep for a couple hours afterwards.

## 2021-01-27 NOTE — PLAN OF CARE
"  Problem: Behavior Regulation Impairment (Disruptive Behavior)  Goal: Improved Impulse and Aggression Control (Disruptive Behavior)  Outcome: No Change    Pt observed in her room, disheveled, untidy. Presents to be tense and restless. Propanolol given as scheduled per order. Pt was compliant with taking the medication. BP: 108/67mmHg, 88bpm.   At 1715H, Pt was out in the milieu, using headphones, watching television, made a phone call and was interactive with one peer. She also ate her dinner in the lounge.   At 1750H, pt claimed she is having anxiety. Rated it as 7. PRN Hydroxyzine given per request. Denies SI/SIB/HI. Denies experiencing hallucinations of any type. Claimed she is also having depression from time to time. Rated it as 5. Verbalized \"Depression not so much. Probably a 5\".   Consumed 75% of share of dinner and took approximately 720cc of fluids.   At 1900H, claimed that she is still anxious, rated it as 7, and she is agitated. PRN Lorazepam given per request. Pt took a shower.   Due medications given. Denies experiencing any side effects of the medications. Pt had a brief outburst while talking to someone on the phone. De escalation techniques and redirection effective. Pt claimed she is frustrated, verbalized \"I called to say hi then that was what I got\". Pt did not elaborate further. Needs attended to. Pt in bed sleeping. No further concerns noted as of this time.   "

## 2021-01-27 NOTE — PROGRESS NOTES
Pt attempted to attend the activity group today, appearing calmer than she has in previous days since admission.  After sitting down for 2 minutes, and writer cutting strings for a bracelet she requested, pt stated she was bored, and wanted to leave.  Encouraged to pace the hallway as needed and return, though she never did.

## 2021-01-27 NOTE — PROGRESS NOTES
CLINICAL NUTRITION SERVICES - BRIEF NOTE      Nutrition Prescription     Recommendations already ordered by Registered Dietitian (RD):  Boost plus (vanilla) @ 10am + 1/2 PB&J @2pm + Ean w/ talita @        Reason for assessment   Provider order - Increased appetite, requesting Boost and/or snacks between meals    Interventions  Medical food supplement therapy: as ordered above    No nutrition follow-up warranted at this time. RD to sign off. Please consult if further needs arise.     Zenaida Medina  Dietetic Intern

## 2021-01-27 NOTE — PLAN OF CARE
Work Completed:  Attended Team Meeting: Reviewed chart notes:  Spoke with [facility] Court Examiner to provide an update on patient's status.     Discharge plan or goal:  Unknown: Civil Commitment Process initiated.                 Barriers to discharge:  Acuity of symptoms. Patient needs to complete preliminary hearing as well as final hearing in order to determine disposition plan.

## 2021-01-27 NOTE — PLAN OF CARE
Writer spoke with [facility] Court Examiner and he will call the unit today to interview patient.

## 2021-01-27 NOTE — PLAN OF CARE
Problem: Sleep Disturbance (Disruptive Behavior)  Goal: Improved Sleep (Disruptive Behavior)  Outcome: Improving   Pt was a sleep at  the beginning of the shift. At 0430H, pt reported feeling anxious, writer administered hydroxyzine 50 mg before Pt went back to bed. No behavioral concerns noted tonight.

## 2021-01-28 LAB
LABORATORY COMMENT REPORT: NORMAL
SARS-COV-2 RNA RESP QL NAA+PROBE: NEGATIVE
SPECIMEN SOURCE: NORMAL

## 2021-01-28 PROCEDURE — 250N000013 HC RX MED GY IP 250 OP 250 PS 637: Performed by: STUDENT IN AN ORGANIZED HEALTH CARE EDUCATION/TRAINING PROGRAM

## 2021-01-28 PROCEDURE — 124N000002 HC R&B MH UMMC

## 2021-01-28 PROCEDURE — 99232 SBSQ HOSP IP/OBS MODERATE 35: CPT | Mod: 95 | Performed by: PSYCHIATRY & NEUROLOGY

## 2021-01-28 PROCEDURE — 250N000013 HC RX MED GY IP 250 OP 250 PS 637: Performed by: PSYCHIATRY & NEUROLOGY

## 2021-01-28 PROCEDURE — 87635 SARS-COV-2 COVID-19 AMP PRB: CPT | Performed by: PSYCHIATRY & NEUROLOGY

## 2021-01-28 PROCEDURE — G0177 OPPS/PHP; TRAIN & EDUC SERV: HCPCS

## 2021-01-28 RX ADMIN — OLANZAPINE 10 MG: 5 TABLET, ORALLY DISINTEGRATING ORAL at 08:27

## 2021-01-28 RX ADMIN — NICOTINE POLACRILEX 4 MG: 2 GUM, CHEWING ORAL at 11:02

## 2021-01-28 RX ADMIN — NICOTINE POLACRILEX 4 MG: 2 GUM, CHEWING ORAL at 08:27

## 2021-01-28 RX ADMIN — LEVONORGESTREL AND ETHINYL ESTRADIOL 1 TABLET: KIT at 08:28

## 2021-01-28 RX ADMIN — NICOTINE POLACRILEX 4 MG: 2 GUM, CHEWING ORAL at 14:42

## 2021-01-28 RX ADMIN — HYDROXYZINE HYDROCHLORIDE 50 MG: 25 TABLET, FILM COATED ORAL at 09:58

## 2021-01-28 RX ADMIN — OLANZAPINE 5 MG: 5 TABLET, FILM COATED ORAL at 11:31

## 2021-01-28 RX ADMIN — NICOTINE POLACRILEX 4 MG: 2 GUM, CHEWING ORAL at 21:15

## 2021-01-28 RX ADMIN — OLANZAPINE 5 MG: 5 TABLET, FILM COATED ORAL at 16:57

## 2021-01-28 RX ADMIN — FLUTICASONE FUROATE 1 PUFF: 100 POWDER RESPIRATORY (INHALATION) at 08:28

## 2021-01-28 RX ADMIN — NICOTINE POLACRILEX 4 MG: 2 GUM, CHEWING ORAL at 17:17

## 2021-01-28 RX ADMIN — NICOTINE POLACRILEX 4 MG: 2 GUM, CHEWING ORAL at 09:58

## 2021-01-28 RX ADMIN — PROPRANOLOL HYDROCHLORIDE 10 MG: 10 TABLET ORAL at 19:09

## 2021-01-28 RX ADMIN — LAMOTRIGINE 25 MG: 25 TABLET ORAL at 08:27

## 2021-01-28 RX ADMIN — NICOTINE POLACRILEX 4 MG: 2 GUM, CHEWING ORAL at 18:25

## 2021-01-28 RX ADMIN — NICOTINE POLACRILEX 4 MG: 2 GUM, CHEWING ORAL at 13:13

## 2021-01-28 RX ADMIN — PROPRANOLOL HYDROCHLORIDE 10 MG: 10 TABLET ORAL at 08:27

## 2021-01-28 RX ADMIN — NICOTINE POLACRILEX 4 MG: 2 GUM, CHEWING ORAL at 15:56

## 2021-01-28 RX ADMIN — NICOTINE POLACRILEX 4 MG: 2 GUM, CHEWING ORAL at 07:34

## 2021-01-28 RX ADMIN — OLANZAPINE 10 MG: 5 TABLET, ORALLY DISINTEGRATING ORAL at 19:09

## 2021-01-28 RX ADMIN — LORAZEPAM 1 MG: 1 TABLET ORAL at 23:59

## 2021-01-28 RX ADMIN — HYDROXYZINE HYDROCHLORIDE 50 MG: 25 TABLET, FILM COATED ORAL at 04:05

## 2021-01-28 RX ADMIN — ACETAMINOPHEN 650 MG: 325 TABLET, FILM COATED ORAL at 12:03

## 2021-01-28 RX ADMIN — PROPRANOLOL HYDROCHLORIDE 10 MG: 10 TABLET ORAL at 14:33

## 2021-01-28 ASSESSMENT — ACTIVITIES OF DAILY LIVING (ADL)
LAUNDRY: UNABLE TO COMPLETE
HYGIENE/GROOMING: INDEPENDENT
DRESS: SCRUBS (BEHAVIORAL HEALTH)
DRESS: SCRUBS (BEHAVIORAL HEALTH)
ORAL_HYGIENE: INDEPENDENT
ORAL_HYGIENE: INDEPENDENT
LAUNDRY: UNABLE TO COMPLETE
HYGIENE/GROOMING: HANDWASHING

## 2021-01-28 NOTE — PLAN OF CARE
Court Hearing Information:  Final Hearing:  February 2, 2021 @ [address] [place] [address] Via Zoom  ITV Request emailed to all parties.

## 2021-01-28 NOTE — PLAN OF CARE
Work Completed:  Attended Team Meeting: Reviewed chart notes: Met with patient to discuss possible disposition plan.     Discharge plan or goal:  Unknown at this time as patient will have her final hearing next week.                 Barriers to discharge:  Acuity of symptoms. Civil commitment process initiated this hospitalization.

## 2021-01-28 NOTE — PLAN OF CARE
"  Problem: OT General Care Plan  Goal: OT Goal 1  Description: OT Goal 1  Outcome: No Change     Pt was in and out of morning OT groups, able to sit down at the table for about 1-4 minutes at a time.  Pt expressed interest in beading, though with disorganized with task.  On one of her many visits to group, after randomly putting a variety of beads on the bead board, pt asked to put this project \"on hold\" and do something else.  Pt was set up with scratch art paper, and enjoyed writing her name in block letters, made a small design, then left group again.  Hungry during group, ate snacks twice throughout the morning.  "

## 2021-01-28 NOTE — PLAN OF CARE
Problem: Suicidal Behavior  Goal: Suicidal Behavior is Absent or Managed  Outcome: Improving  Flowsheets (Taken 1/27/2021 1831)  Mutually Determined Action Steps (Suicidal Behavior Absent/Managed):    sets future-oriented goal    identifies protective factors    Patient presents as blunt, poor concentration and memory recall, yet pleasant on approach, began the shift requesting for medication for anxiety, initially declines gabapentin due to fear of gaining weight; writer explores coping skills to try first and patient using phone to call girlfriend, father and then speaks with dietician who orders boost plus supplement, PB & J and pretzels/humus for snacks; patient showers, watches television and socializing; denies any SI/SIB/HI or hallucinations and rates anxiety 6-7/10, 10 being worst; 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 Appetite good and denies any physical pain, VSS.  Patient given gabapentin at 5 pm and believes somewhat helpful, ativan given at 6:30 pm, reports most helpful, using nicotine gum prn; patient takes HS medications including prn melatonin for sleep and goes to bed by 8 pm; no aggression, elopement attempt or fall concerns.

## 2021-01-28 NOTE — PLAN OF CARE
Problem: Sleep Disturbance (Disruptive Behavior)  Goal: Improved Sleep (Disruptive Behavior)  Outcome: Improving   Pt slept good without any behavioral concerns.  Pt requested for anxiety medication. Writer administered PRN hydroxyzine 50 mg. Pt went back to bed. Pt slept for 6 hours.

## 2021-01-28 NOTE — PROGRESS NOTES
"[facility], [place]   Psychiatric Progress Note  Hospital Day: 10        Interim History:   The patient's care was discussed with the treatment team during the daily team meeting and/or staff's chart notes were reviewed.  Staff report patient was visible in the milieu, continues to report some restlessness, receiving several PRNs with some relief, taking medications as prescribed, no acute events overnight.     Upon interview, patient was lying in bed resting but sat up to engage with writer. Reports her mood is \"decent\" and made request to be able to have her make up to use. Writer will discuss further with team. She then put her headphones on and wanted to listen to the music as it was \"a good song\", writer attempted to continue to engage patient in interview, she would remove the headphones and say \"what?\" and then eventually did take them off again with request. She denies SI or HI, denies AVH. Continues to report some restlessness in her legs, denies other side effects. Denies noting any rashes. Again attempted to discuss starting a medication such as lithium or depakote but she declines. She reports she feels she is well enough to leave the hospital, reviewed she is undergoing commitment process and is on court hold. She again returned to asking for her makeup and terminated interview and writer again shared the team will discuss further.          Medications:       - Psychiatric Emergency -   Does not apply See Admin Instructions     fluticasone  1 puff Inhalation Daily     lamoTRIgine  25 mg Oral Daily     levonorgestrel-ethinyl estradiol  1 tablet Oral Daily     OLANZapine zydis  10 mg Oral BID    Or     OLANZapine  10 mg Intramuscular BID     propranolol  10 mg Oral TID          Allergies:     Allergies   Allergen Reactions     Nkda [No Known Drug Allergies]           Labs:   No results found for this or any previous visit (from the past 48 hour(s)).       Psychiatric " "Examination:     /70   Pulse 86   Temp 98.5  F (36.9  C) (Tympanic)   Resp 14   SpO2 97%   Weight is 0 lbs 0 oz  There is no height or weight on file to calculate BMI.    Orthostatic Vitals     None          Appearance: awake, alert and untidy, sitting on bed  Attitude:  somewhat cooperative  Eye Contact:  fair  Mood:  \"Decent\", appeared somewhat elevated  Affect:  mood congruent  Speech:  clear, coherent and normal prosody, more hyperverbal today  Language: fluent and intact in English  Psychomotor, Gait, Musculoskeletal:  no evidence of tardive dyskinesia, dystonia, or tics, patient appeared restless, moving about room   Thought Process:  more tangential  Associations:  no loose associations  Thought Content:  no evidence of suicidal ideation or homicidal ideation and denies AVH, no overtly paranoid statements made today  Insight:  limited  Judgement:  limited  Oriented to:  time, person, and place  Attention Span and Concentration:  fair  Recent and Remote Memory:  improving, still limited around events leading to hospitalization   Fund of Knowledge:  appropriate    Clinical Global Impressions  First:  Considering your total clinical experience with this particular patient population, how severe are the patient's symptoms at this time?: 6 (01/21/21 1437)  Compared to the patient's condition at the START of treatment, this patient's condition is: 3 (01/21/21 1437)  Most recent:  Considering your total clinical experience with this particular patient population, how severe are the patient's symptoms at this time?: 6 (01/21/21 1437)  Compared to the patient's condition at the START of treatment, this patient's condition is: 3 (01/21/21 1437)           Precautions:     Behavioral Orders   Procedures     Assault precautions     Code 1 - Restrict to Unit     Elopement precautions     Fall precautions     Routine Programming     As clinically indicated     Status 15     Every 15 minutes.          Diagnoses: "   Bipolar I Disorder, current episode manic  Amphetamine use disorder  Cannabis use disorder         Assessment & Plan:   Assessment and hospital summary:  This patient is a 25 year old woman with history of bipolar one disorder and polysubstance use (methamphetamine, alcohol, cocaine, heroin, cannabis) who presented to ED via EMS with agitation and aggression in context of methamphetamine and heroin use and likely medication nonadherence. Her symptoms of responding to internal stimuli, aggression, and disorganization are consistent with a manic episode with psychotic features with potential effects of amphetamine intoxication and opioid withdrawal. She has a history of multiple similar presentations in the ED recently but her symptoms have resolved more quickly, suggesting that her symptoms this time may be more due to her bipolar disorder than substance-induced. Alicia has a history of multiple similar presentations at prior hospitalizations. She has done well in these hospitalizations with an antipsychotic and sometimes a mood stabilizer. She has also required commitment and Carrillo for several of these hospitalizations. Therefore, based on her history and current presentation, she would likely benefit from a mood stabilizer and an antipsychotic to target her manic symptoms, agitation, and psychosis.      Alicia continued to have agitation after admission and declined Depakote. Given her imminent risk to herself and others and poor insight into her mental health, a petition for commitment and Carrillo were sent. As she was declining Depakote and requesting PTA Lamictal, Depakote was stopped and Lamictal initiated. Discussed need for adherence and that Lamictal is not effective in jackie prevention. Alicia began taking Zyprexa 5mg TID two days after admission. Gabapentin PRN was started to target opioid withdrawal as she had low-normal blood pressures and this would therefore be preferred over clonidine for  symptoms management.      On 1/25, patient postured toward staff in an aggressive manner. She required seclusion and emergency neuroleptic medications. Psychiatric emergency was declared on 1/26, and Zyprexa was increased, and backed up with IM. Propranolol was also added to target anxiety and suspected akathisia.       Psychiatric treatment/inteventions:  Medications:   # Bipolar I Disorder:  -continue Lamictal 25mg daily (initiated on 1/20 per pt request). She understands that this is not first line treatment for jackie.   -continue Zyprexa 10 mg BID PO or IM.  Psychiatric emergency declared 1/26 as patient postured toward staff with the intent to assault them on 1/25. She may not decline medication.   -continue to discuss recommendation to start non-neuroleptic mood stabilizer outside of lamotrigine that would be first line for jackie, though patient has consistently declined due to concerns about weight gain.   -continue propranolol 10 mg TID to target akathisia and anxiety, may consider increasing if VS remain WNL  -continue hydroxyzine 25 mg q4h prn for acute anxiety  -continue melatonin PRN insomnia  -continue Zyprexa 10 mg q2h prn for severe agitation  -continue Haldol, Ativan, Benadryl PRN severe agitation/aggression     Laboratory/Imaging: Ordered repeat COVID per protocol given pt has been hospitalized >1 week     Patient will be treated in therapeutic milieu with appropriate individual and group therapies as described.     Medical treatment/interventions:  Medical concerns:   # UTI:  -pt denying any further urinary symptoms   - Continue Bactrim (initiated on 1/24)      # Migraines:  -continue PTA sumatriptan PRN     #Opioid Withdrawal, appears resolved:   -continue Gabapentin 300mg TID PRN opioid withdrawal symptoms  -continue PRN clonidine, Imodium, Zofran, Tylenol  -Discontinued COWS 1/22 given absence of significant withdrawal        Because of behavior leading to a seclusion or restraint episode on  1/25, we have made the following change to the treatment plan: psychiatric emergency was declared 1/26 and pts Zyprexa was increased and backed up with IM. Team petitioning for commitment and Carrillo.       This note was created by undersigned using a Dragon dictation system. All typing errors or contextual distortion are unintentional and software inherent.     Disposition Plan   Reason for ongoing admission: poses an imminent risk to self, poses an imminent risk to others and is unable to care for self due to severe psychosis or jackie  Discharge location: TBD, may pursue CARE given pts poor insight/judgement and severity of symptoms  Discharge Medications: not ordered  Follow-up Appointments: not scheduled  Legal Status: court hold  Entered by: Genevieve Bustamante on 1/28/2021 at 7:28 AM       Video-Visit Details    Type of service:  Video Visit    Video Start Time (time video started): 0938    Video End Time (time video stopped): 0947    Originating Location (pt. Location): [address]    Distant Location (provider location): Provider remote location    Mode of Communication:  Video Conference via [employer]    Physician has received verbal consent for a Video Visit from the patient? Yes    Genevieve Bustamante, DO

## 2021-01-28 NOTE — PLAN OF CARE
Alicia had an unremarkable shift. She continues to be very restless and distractable, pacing the halls and listening to music. Patient is rather med seeking asking for ativan or zyprexa just about every hour. Pleasant and cooperative, attending some groups. Patient is very hungry despite eating full meals and given many snacks. Asymptomatic covid swab sent today, pending results. Continue with POC, notify MD of changes.

## 2021-01-28 NOTE — PROGRESS NOTES
"   01/28/21 9320   Dance Movement Therapy   Type of Intervention structured groups   Response participates with cues/redirection   Hours 1       Pt participated in dance/movement therapy (DMT) using movement to balance vitality with self-regulation.  Participants practiced social skills alternating song selections.  Pt needed direction for safe boundaries and was high energy but not agitated.  When asked, she was able to articulate that she is \"night and day\" better from when she was first admitted to the hospital and that medications are helping her.  She also stated substance use is a major problem for her.  Pt movement was jumpy and quick; she often left the session but returned quickly.  She smiled often and interacted pleasantly.    "

## 2021-01-29 PROCEDURE — 250N000013 HC RX MED GY IP 250 OP 250 PS 637: Performed by: STUDENT IN AN ORGANIZED HEALTH CARE EDUCATION/TRAINING PROGRAM

## 2021-01-29 PROCEDURE — 99233 SBSQ HOSP IP/OBS HIGH 50: CPT | Mod: 95 | Performed by: PSYCHIATRY & NEUROLOGY

## 2021-01-29 PROCEDURE — 124N000002 HC R&B MH UMMC

## 2021-01-29 PROCEDURE — 250N000013 HC RX MED GY IP 250 OP 250 PS 637: Performed by: PSYCHIATRY & NEUROLOGY

## 2021-01-29 RX ORDER — PROPRANOLOL HYDROCHLORIDE 20 MG/1
20 TABLET ORAL 3 TIMES DAILY
Status: DISCONTINUED | OUTPATIENT
Start: 2021-01-29 | End: 2021-03-24 | Stop reason: HOSPADM

## 2021-01-29 RX ADMIN — OLANZAPINE 10 MG: 5 TABLET, ORALLY DISINTEGRATING ORAL at 18:22

## 2021-01-29 RX ADMIN — NICOTINE POLACRILEX 4 MG: 2 GUM, CHEWING ORAL at 11:41

## 2021-01-29 RX ADMIN — NICOTINE POLACRILEX 4 MG: 2 GUM, CHEWING ORAL at 13:43

## 2021-01-29 RX ADMIN — PROPRANOLOL HYDROCHLORIDE 20 MG: 20 TABLET ORAL at 09:45

## 2021-01-29 RX ADMIN — LAMOTRIGINE 25 MG: 25 TABLET ORAL at 09:44

## 2021-01-29 RX ADMIN — GABAPENTIN 300 MG: 300 CAPSULE ORAL at 20:40

## 2021-01-29 RX ADMIN — NICOTINE POLACRILEX 4 MG: 2 GUM, CHEWING ORAL at 21:03

## 2021-01-29 RX ADMIN — OLANZAPINE 10 MG: 5 TABLET, ORALLY DISINTEGRATING ORAL at 09:44

## 2021-01-29 RX ADMIN — NICOTINE POLACRILEX 4 MG: 2 GUM, CHEWING ORAL at 09:48

## 2021-01-29 RX ADMIN — PROPRANOLOL HYDROCHLORIDE 20 MG: 20 TABLET ORAL at 18:23

## 2021-01-29 RX ADMIN — LEVONORGESTREL AND ETHINYL ESTRADIOL 1 TABLET: KIT at 09:45

## 2021-01-29 RX ADMIN — LORAZEPAM 1 MG: 1 TABLET ORAL at 11:41

## 2021-01-29 RX ADMIN — GABAPENTIN 300 MG: 300 CAPSULE ORAL at 01:49

## 2021-01-29 RX ADMIN — PROPRANOLOL HYDROCHLORIDE 20 MG: 20 TABLET ORAL at 14:08

## 2021-01-29 RX ADMIN — NICOTINE POLACRILEX 4 MG: 2 GUM, CHEWING ORAL at 18:22

## 2021-01-29 RX ADMIN — MELATONIN TAB 3 MG 3 MG: 3 TAB at 22:06

## 2021-01-29 RX ADMIN — FLUTICASONE FUROATE 1 PUFF: 100 POWDER RESPIRATORY (INHALATION) at 09:45

## 2021-01-29 ASSESSMENT — ACTIVITIES OF DAILY LIVING (ADL)
LAUNDRY: UNABLE TO COMPLETE
DRESS: SCRUBS (BEHAVIORAL HEALTH)
HYGIENE/GROOMING: INDEPENDENT
ORAL_HYGIENE: INDEPENDENT
DRESS: INDEPENDENT
LAUNDRY: UNABLE TO COMPLETE
ORAL_HYGIENE: INDEPENDENT
HYGIENE/GROOMING: INDEPENDENT

## 2021-01-29 NOTE — PROGRESS NOTES
"Pt is very restless, pacing in circles and unable to sit still when attempting to sit down.  Pt continuously asking writer when she will be able to be processed out of seclusion.  Writer informs her that she needs to show she is calm and in control of her behavior for a reasonable amount of time.  Pt immediately becomes upset and begins screaming, pounding on the door, and threatening writer.  Pt states that she has \"always been in control, the girl sitting here before and the dude in the hallway did this.  They provoked me on purpose!\"  Pt then asks writer to remove her bed pan, when stated that cannot be done immediately pt again began shouting insults and threats \"you're worthless, stupid fucking bitch.  It's people like you that are so worthless I just want to fucking kill people\"    "

## 2021-01-29 NOTE — PLAN OF CARE
Problem: Behavior Regulation Impairment (Disruptive Behavior)  Goal: Improved Impulse and Aggression Control (Disruptive Behavior)  Outcome: No Change    Pt c/o restlessness and agitation early in the shift and was given Ativan 1 mg orally at 1141. Pt later reported that the medication helped. Pt continues to be somewhat restless and distractable. She walked the hallways, listened to music, and rocked in the rocking chair.     Pt continues to feel hungry. She ate all of her meals and ordered snacks. Pt reported she had diarrhea yesterday and asked for, and was given, a ginger ale to settle her stomach.    Pt denied SI/SIB and urges to hurt others. Pt denied AVH. Pt's speech was somewhat pressured. Affect was blunted and less tense. Mood was somewhat anxious due to restlessness. Pt had no other physical complaints. Will continue to monitor.

## 2021-01-29 NOTE — PROGRESS NOTES
"Pt pacing around seclusion, talking calmly with writer.  Pt c/o of being cold, sore feet, \"bad fucking smells\" and being claustrophobic.  Pt tearful, repeatedly asking to return to room.  She continues to have poor insight as to why she is currently secluded, stating that she was provoked, but willing to go to her room for the remainder of the night if \"I can just get some socks and a fucking snack man!\"      "

## 2021-01-29 NOTE — PROVIDER NOTIFICATION
01/28/21 1917   Seclusion or Restraint Order   Attending Physician's Name Hitesh   In Person Face to Face Assessment Conducted Yes-Eval of pt's immediate situation, reaction to intervention, complete review of systems assessment, behavioral assessment & review/assessment of hx, drugs & meds, recent labs, etc, behavioral condition, need to continue/terminate restraint/seclusion   Patient Experienced No adverse physical outcome from seclusion/restraint initiation     Face to face assessment completed with no stated or observed adverse physical outcomes and seclusion still indicated at this time. Upon assessment patient extremely agitated, yelling profanity, and making threatening comments towards specific staff members. Patient too agitated to answer verbal questions. On call provider Dr. Proctor contacted with results of face to face examination. Will continue to monitor.

## 2021-01-29 NOTE — PLAN OF CARE
Problem: Behavioral Health Plan of Care  Goal: Plan of Care Review  Outcome: No Change   Pt was manic and intrusive. She was extremely agitated and threatening when her request was denied by staff. She threatens to kill the staff and put her finger on the staff's face. She was redirected back to her room but continue threatening staff. She was yelling and banging on the door. She threw headphone at this writer and wanted to come out of her room to attack staff. Due to her threatening behaviors, code 21 was called, she walked to seclusion with staff.

## 2021-01-29 NOTE — PROGRESS NOTES
"[facility], [place]   Psychiatric Progress Note  Hospital Day: 11        Interim History:   The patient's care was discussed with the treatment team during the daily team meeting and/or staff's chart notes were reviewed.  Staff report patient was visible in the milieu, became more agitated in the evening, making threatening statements to staff, attempting to leave room to assault staff, was not redirectable, required use of seclusion, able to be processed out later in evening, taking medication as prescribed.     Upon interview, patient was sleeping but awoke to voice. Reports mood is \"okay\" and sleep has been good, she continues to feel restless, discussed increasing propranolol to help target this and she is in agreement. Tolerating other medications, denies any rashes. Denies AVH, denies SI or HI. When asked about events leading to seclusion last night she reports she was upset at staff as \"they were blackmailing me\" and telling her \"to stop yelling at people\" when she believes she was not doing this. She states she is feeling calm at this time. Aside from the restlessness, endorses some pain in her feet, reviewed that a cream was ordered for her. 10 pt ROS otherwise negative. No additional concerns at this time.          Medications:       - Psychiatric Emergency -   Does not apply See Admin Instructions     fluticasone  1 puff Inhalation Daily     lamoTRIgine  25 mg Oral Daily     levonorgestrel-ethinyl estradiol  1 tablet Oral Daily     OLANZapine zydis  10 mg Oral BID    Or     OLANZapine  10 mg Intramuscular BID     propranolol  10 mg Oral TID          Allergies:     Allergies   Allergen Reactions     Nkda [No Known Drug Allergies]           Labs:     Recent Results (from the past 48 hour(s))   Asymptomatic SARS-CoV-2 COVID-19 Virus (Coronavirus) by PCR    Collection Time: 01/28/21  1:30 PM    Specimen: Nasopharyngeal   Result Value Ref Range    SARS-CoV-2 Virus Specimen Source " "Nasal     SARS-CoV-2 PCR Result NEGATIVE     SARS-CoV-2 PCR Comment (Note)           Psychiatric Examination:     /73   Pulse 92   Temp 98  F (36.7  C) (Oral)   Resp 14   SpO2 98%   Weight is 0 lbs 0 oz  There is no height or weight on file to calculate BMI.    Orthostatic Vitals     None          Appearance: awake, alert and untidy, lying in bed  Attitude:  somewhat cooperative  Eye Contact:  poor   Mood:  \"okay\"  Affect:  mood congruent  Speech:  clear, coherent and normal prosody, less hyperverbal  Language: fluent and intact in English  Psychomotor, Gait, Musculoskeletal:  no evidence of tardive dyskinesia, dystonia, or tics, less restless today  Thought Process:  tangential  Associations:  no loose associations  Thought Content:  no evidence of suicidal ideation or homicidal ideation and denies AVH, paranoia present  Insight:  limited  Judgement:  limited  Oriented to:  time, person, and place  Attention Span and Concentration:  fair  Recent and Remote Memory:  improving, still limited around events leading to hospitalization   Fund of Knowledge:  appropriate    Clinical Global Impressions  First:  Considering your total clinical experience with this particular patient population, how severe are the patient's symptoms at this time?: 6 (01/21/21 1437)  Compared to the patient's condition at the START of treatment, this patient's condition is: 3 (01/21/21 1437)  Most recent:  Considering your total clinical experience with this particular patient population, how severe are the patient's symptoms at this time?: 7 (01/29/21 1522)  Compared to the patient's condition at the START of treatment, this patient's condition is: 4 (01/29/21 1522)           Precautions:     Behavioral Orders   Procedures     Assault precautions     Code 1 - Restrict to Unit     Elopement precautions     Fall precautions     Routine Programming     As clinically indicated     Status 15     Every 15 minutes.          Diagnoses: "   Bipolar I Disorder, current episode manic  Amphetamine use disorder  Cannabis use disorder  Akathisia   Foot pain/dry skin         Assessment & Plan:   Assessment and hospital summary:  This patient is a 25 year old woman with history of bipolar one disorder and polysubstance use (methamphetamine, alcohol, cocaine, heroin, cannabis) who presented to ED via EMS with agitation and aggression in context of methamphetamine and heroin use and likely medication nonadherence. Her symptoms of responding to internal stimuli, aggression, and disorganization are consistent with a manic episode with psychotic features with potential effects of amphetamine intoxication and opioid withdrawal. She has a history of multiple similar presentations in the ED recently but her symptoms have resolved more quickly, suggesting that her symptoms this time may be more due to her bipolar disorder than substance-induced. Alicia has a history of multiple similar presentations at prior hospitalizations. She has done well in these hospitalizations with an antipsychotic and sometimes a mood stabilizer. She has also required commitment and Carrillo for several of these hospitalizations. Therefore, based on her history and current presentation, she would likely benefit from a mood stabilizer and an antipsychotic to target her manic symptoms, agitation, and psychosis.      Alicia continued to have agitation after admission and declined Depakote. Given her imminent risk to herself and others and poor insight into her mental health, a petition for commitment and Carrillo were sent. As she was declining Depakote and requesting PTA Lamictal, Depakote was stopped and Lamictal initiated. Discussed need for adherence and that Lamictal is not effective in jackie prevention. Alicia began taking Zyprexa 5mg TID two days after admission. Gabapentin PRN was started to target opioid withdrawal as she had low-normal blood pressures and this would therefore be  "preferred over clonidine for symptoms management.      On 1/25, patient postured toward staff in an aggressive manner. She required seclusion and emergency neuroleptic medications. Psychiatric emergency was declared on 1/26, and Zyprexa was increased, and backed up with IM. Propranolol was also added to target anxiety and suspected akathisia. On 1/28 patient was threatening staff and wanting to leave room to \"attack\" staff, was not redirectable, required use of seclusion.        Psychiatric treatment/inteventions:  Medications:   # Bipolar I Disorder:  -continue Lamictal 25mg daily (initiated on 1/20 per pt request). She understands that this is not first line treatment for jackie.   -continue Zyprexa 10 mg BID PO or IM.  Psychiatric emergency declared 1/26 as patient postured toward staff with the intent to assault them on 1/25. She may not decline medication.   -continue to discuss recommendation to start non-neuroleptic mood stabilizer outside of lamotrigine that would be first line for jackie, though patient has consistently declined due to concerns about weight gain.   -increase propranolol to 20 mg TID to target akathisia and anxiety  -continue hydroxyzine 25 mg q4h prn for acute anxiety  -continue melatonin PRN insomnia  -continue Zyprexa 10 mg q2h prn for severe agitation  -continue Haldol, Ativan, Benadryl PRN severe agitation/aggression     Laboratory/Imaging: Ordered repeat COVID per protocol given pt has been hospitalized >1 week yesterday and was negative     Patient will be treated in therapeutic milieu with appropriate individual and group therapies as described.     Medical treatment/interventions:  Medical concerns:   # UTI, resolved:  -pt denying any further urinary symptoms   - completed treatment with Bactrim      # Migraines:  -continue PTA sumatriptan PRN     #Opioid Withdrawal, appears resolved:   -continue Gabapentin 300mg TID PRN opioid withdrawal symptoms  -continue PRN clonidine, Imodium, " Zofran, Tylenol  -Discontinued COWS 1/22 given absence of significant withdrawal    #Dry Feet  -ordered PRN Eucerin cream, continue to monitor         Because of behavior leading to a seclusion or restraint episode on 1/25, we have made the following change to the treatment plan: psychiatric emergency was declared 1/26 and pts Zyprexa was increased and backed up with IM. Team petitioning for commitment and Carrillo. Pt had another episode of seclusion on 1/28, medication being adjusted to help decrease agitation, commitment and Carrillo continues to be pursued.     This note was created by undersigned using a Dragon dictation system. All typing errors or contextual distortion are unintentional and software inherent.     Disposition Plan   Reason for ongoing admission: poses an imminent risk to self, poses an imminent risk to others and is unable to care for self due to severe psychosis or jackie  Discharge location: TBD, may pursue CARE given pts poor insight/judgement and severity of symptoms  Discharge Medications: not ordered  Follow-up Appointments: not scheduled  Legal Status: court hold  Entered by: Genevieve Bustamante on 1/29/2021 at 7:28 AM       Video-Visit Details    Type of service:  Video Visit    Video Start Time (time video started): 0838    Video End Time (time video stopped): 0846    Originating Location (pt. Location): [address]    Distant Location (provider location): Provider remote location    Mode of Communication:  Video Conference via [employer]    Physician has received verbal consent for a Video Visit from the patient? Yes    Genevieve Bustamante, DO

## 2021-01-29 NOTE — PROGRESS NOTES
Patient originally awake at around 12 Midnight & came up to the med room window requesting Ativan for high anxiety.  Patient's speech pressured & she was dancing around in little steps in the hallway.  Patient given Ativan 1 mg po at that time & resettled back in her room but did not fall back asleep.  Patient later came out into the hallway & sat in a rocking chair by the med room window.  While she was rocking back and forth in the rocker, she kept complaining of her feet hurting her & requesting pain medication for same.  RN examined her feet and noted them to be very flaky &  dry bilaterally & offered the patient Gabapentin. Patient took the prn Gabapentin, but while the RN was getting the medication this patient proceeded to rub vaseline all over the bottoms of her feet & then put slipper socks on them.  Patient feet were also elevated at that time.  Patient now appears to be sleeping soundly.

## 2021-01-29 NOTE — PROVIDER NOTIFICATION
01/28/21 2045   Debriefing   Debriefing DO   Does patient understand why the event happened? Yes   Does patient agree to safe behaviors? Yes   What can we do differently so this doesn't happen again? Other (comment)   Plan of care reviewed and modified Yes   Pt agreed to safe behaviors on the unit. She promised not to threaten or attack staff. She is calm and pleasant at this time. Seclusion discontinued.

## 2021-01-29 NOTE — PLAN OF CARE
Work Completed:  Attended Team Meeting: Reviewed chart notes: Worked on AVS    Discharge plan or goal:  Unknown: Civil commitment initiated this hospitalization                Barriers to discharge:   Acuity of symptoms. Medication changes/adjustments. Patient has final Court Hearing next week.

## 2021-01-29 NOTE — PROVIDER NOTIFICATION
01/28/21 1915   Justification   Clinical Justification Others   Pt was manic and intrusive. She was extremely agitated and threatening when her request was denied by staff. She threatens to kill the staff and put her finger on the staff's face. She was redirected back to her room but continue threatening staff. She was yelling and banging on the door. She threw headphone at this writer and wanted to come out of her room to attack staff. Due to her threatening behaviors, code 21 was called, and she walked to seclusion with staff.

## 2021-01-30 PROCEDURE — 250N000013 HC RX MED GY IP 250 OP 250 PS 637: Performed by: PSYCHIATRY & NEUROLOGY

## 2021-01-30 PROCEDURE — 124N000002 HC R&B MH UMMC

## 2021-01-30 PROCEDURE — 250N000013 HC RX MED GY IP 250 OP 250 PS 637: Performed by: STUDENT IN AN ORGANIZED HEALTH CARE EDUCATION/TRAINING PROGRAM

## 2021-01-30 RX ADMIN — PROPRANOLOL HYDROCHLORIDE 20 MG: 20 TABLET ORAL at 09:53

## 2021-01-30 RX ADMIN — NICOTINE POLACRILEX 4 MG: 2 GUM, CHEWING ORAL at 11:47

## 2021-01-30 RX ADMIN — NICOTINE POLACRILEX 4 MG: 2 GUM, CHEWING ORAL at 18:54

## 2021-01-30 RX ADMIN — PROPRANOLOL HYDROCHLORIDE 20 MG: 20 TABLET ORAL at 19:50

## 2021-01-30 RX ADMIN — OLANZAPINE 10 MG: 5 TABLET, ORALLY DISINTEGRATING ORAL at 19:50

## 2021-01-30 RX ADMIN — LORAZEPAM 1 MG: 1 TABLET ORAL at 06:12

## 2021-01-30 RX ADMIN — GABAPENTIN 300 MG: 300 CAPSULE ORAL at 06:12

## 2021-01-30 RX ADMIN — FLUTICASONE FUROATE 1 PUFF: 100 POWDER RESPIRATORY (INHALATION) at 09:53

## 2021-01-30 RX ADMIN — LAMOTRIGINE 25 MG: 25 TABLET ORAL at 09:53

## 2021-01-30 RX ADMIN — PROPRANOLOL HYDROCHLORIDE 20 MG: 20 TABLET ORAL at 13:57

## 2021-01-30 RX ADMIN — NICOTINE POLACRILEX 4 MG: 2 GUM, CHEWING ORAL at 16:20

## 2021-01-30 RX ADMIN — NICOTINE POLACRILEX 4 MG: 2 GUM, CHEWING ORAL at 10:00

## 2021-01-30 RX ADMIN — LEVONORGESTREL AND ETHINYL ESTRADIOL 1 TABLET: KIT at 09:54

## 2021-01-30 RX ADMIN — NICOTINE POLACRILEX 4 MG: 2 GUM, CHEWING ORAL at 13:57

## 2021-01-30 RX ADMIN — NICOTINE POLACRILEX 4 MG: 2 GUM, CHEWING ORAL at 22:34

## 2021-01-30 RX ADMIN — NICOTINE POLACRILEX 4 MG: 2 GUM, CHEWING ORAL at 20:54

## 2021-01-30 RX ADMIN — OLANZAPINE 10 MG: 5 TABLET, ORALLY DISINTEGRATING ORAL at 09:53

## 2021-01-30 RX ADMIN — GABAPENTIN 300 MG: 300 CAPSULE ORAL at 22:34

## 2021-01-30 RX ADMIN — OLANZAPINE 5 MG: 5 TABLET, FILM COATED ORAL at 11:46

## 2021-01-30 ASSESSMENT — ACTIVITIES OF DAILY LIVING (ADL)
ORAL_HYGIENE: INDEPENDENT
HYGIENE/GROOMING: INDEPENDENT
DRESS: INDEPENDENT
LAUNDRY: UNABLE TO COMPLETE

## 2021-01-30 NOTE — PLAN OF CARE
Problem: Behavior Regulation Impairment (Disruptive Behavior)  Goal: Improved Impulse and Aggression Control (Disruptive Behavior)  Outcome: No Change   Pt had an uneventful evening. She appeared more organized compared to yesterday. She spent the majority of this evening sleeping. Upon awake, she is restless, manic, and slightly disorganized. No agitation/threatening behaviors towards nursing staff. She took scheduled medication without any issue. Pt s vital signs slightly elevated 136/93, 69, 18, 97.9. Apatite is good. She took a shower this evening. She denied pain or any other discomfort. No SI/SIB.     Pt almost gets into a fight with a female peer due to who controls the lounge TV in port 1. Both patients were , and Alicia was asked to stay in her room.

## 2021-01-30 NOTE — PLAN OF CARE
Problem: Behavioral Health Plan of Care  Goal: Optimized Coping Skills in Response to Life Stressors  Outcome: Improving   Had a good day today. Reports being bored. She is medication compliant. Complies with the safety considerations on the unit. She and another peer apologized to each other for their previous behaviors. Denies any new or acute symptoms. Requests PRN medications for increased anxiety. Overall isolative to her room today.

## 2021-01-31 PROCEDURE — 250N000013 HC RX MED GY IP 250 OP 250 PS 637: Performed by: STUDENT IN AN ORGANIZED HEALTH CARE EDUCATION/TRAINING PROGRAM

## 2021-01-31 PROCEDURE — 250N000013 HC RX MED GY IP 250 OP 250 PS 637: Performed by: PSYCHIATRY & NEUROLOGY

## 2021-01-31 PROCEDURE — 124N000002 HC R&B MH UMMC

## 2021-01-31 RX ADMIN — PROPRANOLOL HYDROCHLORIDE 20 MG: 20 TABLET ORAL at 20:00

## 2021-01-31 RX ADMIN — NICOTINE POLACRILEX 4 MG: 2 GUM, CHEWING ORAL at 14:26

## 2021-01-31 RX ADMIN — HYDROXYZINE HYDROCHLORIDE 50 MG: 25 TABLET, FILM COATED ORAL at 16:26

## 2021-01-31 RX ADMIN — NICOTINE POLACRILEX 4 MG: 2 GUM, CHEWING ORAL at 20:01

## 2021-01-31 RX ADMIN — OLANZAPINE 10 MG: 5 TABLET, ORALLY DISINTEGRATING ORAL at 20:01

## 2021-01-31 RX ADMIN — LORAZEPAM 1 MG: 1 TABLET ORAL at 00:06

## 2021-01-31 RX ADMIN — NICOTINE POLACRILEX 4 MG: 2 GUM, CHEWING ORAL at 18:46

## 2021-01-31 RX ADMIN — OLANZAPINE 10 MG: 5 TABLET, ORALLY DISINTEGRATING ORAL at 08:38

## 2021-01-31 RX ADMIN — NICOTINE POLACRILEX 4 MG: 2 GUM, CHEWING ORAL at 12:16

## 2021-01-31 RX ADMIN — PROPRANOLOL HYDROCHLORIDE 20 MG: 20 TABLET ORAL at 08:38

## 2021-01-31 RX ADMIN — NICOTINE POLACRILEX 4 MG: 2 GUM, CHEWING ORAL at 09:59

## 2021-01-31 RX ADMIN — LAMOTRIGINE 25 MG: 25 TABLET ORAL at 08:52

## 2021-01-31 RX ADMIN — NICOTINE POLACRILEX 4 MG: 2 GUM, CHEWING ORAL at 16:24

## 2021-01-31 RX ADMIN — GABAPENTIN 300 MG: 300 CAPSULE ORAL at 21:14

## 2021-01-31 RX ADMIN — LEVONORGESTREL AND ETHINYL ESTRADIOL 1 TABLET: KIT at 08:51

## 2021-01-31 RX ADMIN — FLUTICASONE FUROATE 1 PUFF: 100 POWDER RESPIRATORY (INHALATION) at 08:49

## 2021-01-31 RX ADMIN — GABAPENTIN 300 MG: 300 CAPSULE ORAL at 13:51

## 2021-01-31 ASSESSMENT — ACTIVITIES OF DAILY LIVING (ADL)
LAUNDRY: UNABLE TO COMPLETE
DRESS: INDEPENDENT
HYGIENE/GROOMING: INDEPENDENT
ORAL_HYGIENE: INDEPENDENT

## 2021-01-31 NOTE — PLAN OF CARE
Problem: Mood Impairment (Disruptive Behavior)  Goal: Improved Mood Symptoms (Disruptive Behavior)  Outcome: No Change   Pt had a pleasant evening. She appears manic, restless, and slightly disorganized. She is fully alert and oriented but had poor insight into her current mental health. She remains medication complaint and denies SE s. Pt almost gets into a fight with another female [facility] and uses profanity words towards her. Staff immediately intervened and moved the other patient to port 2 with the in-between doors closed. Pt took a shower this evening. Vital signs stable. She denied pain or other discomforts. No SI/SIB.

## 2021-01-31 NOTE — PLAN OF CARE
Patient has been in and out of her room for most of the shift.  She became agitated late morning when she wanted snacks and staff were unable to assist her at immediately.  She was ultimately asked to go to her room which she refused to do.  When she was not given any attention for her disruptive behavior she eventaully went to her room.  Later her mood and behavior improved.  She requested PRN Gabapentin but was offered her scheduled Inderal instead. She agreed to that but her BP was too low for the Inderal to be given and she was given the PRN gabapentin.  The remainder of the shift went well.

## 2021-02-01 ENCOUNTER — TRANSFERRED RECORDS (OUTPATIENT)
Dept: HEALTH INFORMATION MANAGEMENT | Facility: CLINIC | Age: 26
End: 2021-02-01

## 2021-02-01 PROCEDURE — 250N000013 HC RX MED GY IP 250 OP 250 PS 637: Performed by: PSYCHIATRY & NEUROLOGY

## 2021-02-01 PROCEDURE — 124N000002 HC R&B MH UMMC

## 2021-02-01 PROCEDURE — 99233 SBSQ HOSP IP/OBS HIGH 50: CPT | Mod: 95 | Performed by: PSYCHIATRY & NEUROLOGY

## 2021-02-01 PROCEDURE — 250N000013 HC RX MED GY IP 250 OP 250 PS 637: Performed by: STUDENT IN AN ORGANIZED HEALTH CARE EDUCATION/TRAINING PROGRAM

## 2021-02-01 RX ADMIN — FLUTICASONE FUROATE 1 PUFF: 100 POWDER RESPIRATORY (INHALATION) at 08:28

## 2021-02-01 RX ADMIN — NICOTINE POLACRILEX 4 MG: 2 GUM, CHEWING ORAL at 18:08

## 2021-02-01 RX ADMIN — PROPRANOLOL HYDROCHLORIDE 20 MG: 20 TABLET ORAL at 08:35

## 2021-02-01 RX ADMIN — NICOTINE POLACRILEX 4 MG: 2 GUM, CHEWING ORAL at 16:02

## 2021-02-01 RX ADMIN — PROPRANOLOL HYDROCHLORIDE 20 MG: 20 TABLET ORAL at 14:08

## 2021-02-01 RX ADMIN — OLANZAPINE 10 MG: 5 TABLET, ORALLY DISINTEGRATING ORAL at 20:15

## 2021-02-01 RX ADMIN — LEVONORGESTREL AND ETHINYL ESTRADIOL 1 TABLET: KIT at 08:28

## 2021-02-01 RX ADMIN — HYDROXYZINE HYDROCHLORIDE 50 MG: 25 TABLET, FILM COATED ORAL at 16:02

## 2021-02-01 RX ADMIN — HYDROXYZINE HYDROCHLORIDE 50 MG: 25 TABLET, FILM COATED ORAL at 22:31

## 2021-02-01 RX ADMIN — NICOTINE POLACRILEX 4 MG: 2 GUM, CHEWING ORAL at 20:15

## 2021-02-01 RX ADMIN — NICOTINE POLACRILEX 4 MG: 2 GUM, CHEWING ORAL at 10:33

## 2021-02-01 RX ADMIN — LAMOTRIGINE 25 MG: 25 TABLET ORAL at 08:28

## 2021-02-01 RX ADMIN — OLANZAPINE 10 MG: 5 TABLET, ORALLY DISINTEGRATING ORAL at 08:28

## 2021-02-01 RX ADMIN — PROPRANOLOL HYDROCHLORIDE 20 MG: 20 TABLET ORAL at 20:15

## 2021-02-01 RX ADMIN — NICOTINE POLACRILEX 4 MG: 2 GUM, CHEWING ORAL at 08:28

## 2021-02-01 RX ADMIN — OLANZAPINE 5 MG: 5 TABLET, FILM COATED ORAL at 16:56

## 2021-02-01 RX ADMIN — GABAPENTIN 300 MG: 300 CAPSULE ORAL at 18:08

## 2021-02-01 RX ADMIN — LORAZEPAM 1 MG: 1 TABLET ORAL at 10:35

## 2021-02-01 ASSESSMENT — ACTIVITIES OF DAILY LIVING (ADL)
HEARING_DIFFICULTY_OR_DEAF: NO
DIFFICULTY_COMMUNICATING: NO
HYGIENE/GROOMING: SHOWER;INDEPENDENT
WALKING_OR_CLIMBING_STAIRS_DIFFICULTY: NO
DRESSING/BATHING_DIFFICULTY: NO
ORAL_HYGIENE: INDEPENDENT
DOING_ERRANDS_INDEPENDENTLY_DIFFICULTY: NO
HYGIENE/GROOMING: SHOWER;INDEPENDENT
LAUNDRY: UNABLE TO COMPLETE
DRESS: SCRUBS (BEHAVIORAL HEALTH)
TOILETING_ISSUES: NO
DIFFICULTY_EATING/SWALLOWING: NO
ORAL_HYGIENE: INDEPENDENT
WEAR_GLASSES_OR_BLIND: NO
LAUNDRY: UNABLE TO COMPLETE
FALL_HISTORY_WITHIN_LAST_SIX_MONTHS: NO
DRESS: SCRUBS (BEHAVIORAL HEALTH)
CONCENTRATING,_REMEMBERING_OR_MAKING_DECISIONS_DIFFICULTY: YES

## 2021-02-01 NOTE — PLAN OF CARE
Problem: Mood Impairment (Disruptive Behavior)  Goal: Improved Mood Symptoms (Disruptive Behavior)  Outcome: No Change   Pt had a pleasant evening. She is visible in the milieu, pacing back and forth the hallway. She appears manic, restless, and slightly disorganized. She did not seem to be responding to internal stimuli. She is alert and oriented but had poor insight into her current mental health.      She was redirected away from the med window several times this evening due to frequent requests for diazepam. She became agitated after been told she has no order for diazepam. She yelled, scream, and slammed her door. She was asked to stay in her room until she can calm down. Hydroxyzine 50 mg was given for agitation, and she was placed on an hourly request.      Late this evening, pt complained of restless legs syndrome. Pt was observed to be restless and having a problem resting in bed or sitting for a long time. PRN Neurontin 300 mg given. No altercation with peers this shift. She remains medication compliant and denied SE s. Vital signs stable. She denied pain and another discomfort. She denied SI, SIB.

## 2021-02-01 NOTE — PLAN OF CARE
BEHAVIORAL TEAM DISCUSSION    Participants: Alisa Palencia, RN, TOAN Ward Alex, RN  Progress:   Anticipated length of stay: Unknown  Continued Stay Criteria/Rationale:  Patient is in the civil commitment process  Medical/Physical:  Per Internal Medicine  Precautions:   Behavioral Orders   Procedures    Assault precautions    Code 1 - Restrict to Unit    Elopement precautions    Fall precautions    Routine Programming     As clinically indicated    Status 15     Every 15 minutes.     Plan:  Patient has her final court hearing tomorrow . Recommendation is CARE Program. CTC will coordinate disposition and aftercare plan.   Rationale for change in precautions or plan:  No change

## 2021-02-01 NOTE — PLAN OF CARE
Pt presents as restless and labile. She had periods of increased agitation throughout the day, typically when her requests were not granted immediately. She reported feeling angry that a female peer on pod 2 met with Dr Reveles before she did, and perseverated on this the rest of the day. Alicia has been irritable on approach. She has some psychomotor agitation, and is frequently pacing. She is unable to sit and focus on one task for longer than a few minutes. Denies SI/SIB and HI. Observed to be laughing to herself while listening to music. She was compliant with scheduled medications. Requested prn lorazepam 1 mg for c/o agitation/anxiety, which she received at 1035. Appetite and fluid intake good. She showered this morning and is attending to ADLs.   Pt requested to speak with the  today, and was sleeping when they called.

## 2021-02-01 NOTE — PLAN OF CARE
"  Problem: OT General Care Plan  Goal: OT Goal 1  Description: OT Goal 1  Outcome: No Change     Type of group: Topic group  Duration: 50 minutes (pt did not participate full session, not billed)  Number of participants: 2 (neither able to fully participate)  Scope of service: Mental health management group focused on a further exploration of how pt is feeling based on responses from the following questions/worksheet prompts.      I feel, I wish, I think, I need, I hope, I want:    Pt Response or reaction to treatment intervention:  Pt filled in each sentence completion with words about needing to discharge from the hospital/getting out of here.  Pt was highly distractable, putting on headphones and walking around constantly as writer was trying to interact with her.  When asked what makes her feel she is ready for discharge, pt responded \" I took a whole lot of drugs to get in here, and I took a whole lot to get out.  Some prescribed by the doctors, and some prescribed by the street\".  Pt states she wants to get out of here and \"finagle\" her way to outpatient treatment, \"can [facility] to [facility]?\"  "

## 2021-02-01 NOTE — PROGRESS NOTES
"[facility], [place]   Psychiatric Progress Note  Hospital Day: 14        Interim History:   The patient's care was discussed with the treatment team during the daily team meeting and/or staff's chart notes were reviewed.  Staff report patient last required seclusion on 1/28. She continues to exhibit irritability and agitation, usually prompted by not getting needs met immediately. She was in a verbal altercation with a peer on Friday evening, and needed to be . She continues to appear manic, restless, and slightly disorganized. She is fully alert and oriented when awake. She remains medication adherent. Made frequent requests for prn Valium. She yelled, screamed, and slammed her door at one point when she was informed that she did not have an order for Valium. She consistently denied SI/SIB. She does appear to be responding to internal stimuli at times as evidenced by laughing to herself.     Upon interview, patient reported feeling \"decent.\" She requested an order to allow her mother to bring her pizza. She mentioned that this was an option for one of her peers. Discussed hospital policy, though offered double portions as she noted that she was feeling \"hungry all the time.\" She denies other side effects. Feels that Zyprexa has been helpful for her in that it \"keeps me calm.\" She continues to feel restless despite added propranolol. She said that she has felt restless since she was [age] years old. Attributed this to \"restless leg syndrome.\" She does not feel that prn Gabapentin helps alleviate restlessness. She denied SI, SIB, AH/VH. She continues to endorse paranoia, stating that she feels that \"everyone who does drugs\" is out to get her. She added \"They think I am a  rat.\" She did not wish to elaborate. She feels safe in the hospital. She again stated that she does not feel she needs inpatient CD treatment. She is concerned about the waitlist because \"I am not a hypodermic " "user and so I am going to be low on the list.\" She prefers outpatient CD treatment on the Internet. She said that OP CD treatment helped her maintain sobriety for 6-12 months when she was 13 years old. She thinks it would help again. She wants to be sober. She then said \"I might go to inpatient, but only if I get some pizza.\" Continues to have very poor insight and judgment.     Spoke with patient's mother over the phone for > 20 minutes to provide update, discuss medication regimen, CD treatment plan. Mother said that Margarita appears to be improving with regards to her anger and agitation. She is slightly more organized in her thinking during their conversations (mother speaks with her everyday), but she notes that Margarita continues to express concerns about conspiracy theories. She believes that she has a twin sister, and recently expressed this concern to her father. She mentioned that \"everyone looks like her too.\" Mother does not feel that patient is at her baseline. She also notes that Margarita will often stop taking medication and/or relapse to lose weight; \"weight is a big concern for her. She likes to feel and look skinny.\" Mother is in full support of a CARE program. She is concerned that Margarita will not remain at a CD facility if it is unlocked due to her impulsivity and \"anosognosia.\"          Medications:       - Psychiatric Emergency -   Does not apply See Admin Instructions     fluticasone  1 puff Inhalation Daily     lamoTRIgine  25 mg Oral Daily     levonorgestrel-ethinyl estradiol  1 tablet Oral Daily     OLANZapine zydis  10 mg Oral BID    Or     OLANZapine  10 mg Intramuscular BID     propranolol  20 mg Oral TID          Allergies:     Allergies   Allergen Reactions     Nkda [No Known Drug Allergies]           Labs:     No results found for this or any previous visit (from the past 48 hour(s)).       Psychiatric Examination:     /67   Pulse 97   Temp 98.7  F (37.1  C) (Tympanic)   Resp 16   Wt " "58.4 kg (128 lb 12.8 oz)   SpO2 95%   BMI 20.17 kg/m    Weight is 128 lbs 12.8 oz  Body mass index is 20.17 kg/m .    Orthostatic Vitals     None          Appearance: awake, alert and untidy, lying in bed  Attitude: more cooperative than previous days  Eye Contact:  poor   Mood:  \"decent\"   Affect:  mood congruent  Speech:  clear, coherent and normal prosody, less hyperverbal  Language: fluent and intact in English  Psychomotor, Gait, Musculoskeletal:  no evidence of tardive dyskinesia, dystonia, or tics, restless  Thought Process:  tangential  Associations:  no loose associations  Thought Content:  no evidence of suicidal ideation or homicidal ideation and denies AVH, paranoia present  Insight:  limited  Judgement:  limited  Oriented to:  time, person, and place  Attention Span and Concentration:  fair  Recent and Remote Memory:  improving, still limited around events leading to hospitalization   Fund of Knowledge:  appropriate    Clinical Global Impressions  First:  Considering your total clinical experience with this particular patient population, how severe are the patient's symptoms at this time?: 6 (01/21/21 1437)  Compared to the patient's condition at the START of treatment, this patient's condition is: 3 (01/21/21 1437)  Most recent:  Considering your total clinical experience with this particular patient population, how severe are the patient's symptoms at this time?: 7 (01/29/21 1522)  Compared to the patient's condition at the START of treatment, this patient's condition is: 4 (01/29/21 1522)           Precautions:     Behavioral Orders   Procedures     Assault precautions     Code 1 - Restrict to Unit     Elopement precautions     Fall precautions     Routine Programming     As clinically indicated     Status 15     Every 15 minutes.          Diagnoses:     Bipolar I Disorder, current episode manic  Amphetamine use disorder, severe  Cannabis use disorder  Akathisia   Foot pain/dry skin         " Assessment & Plan:   Assessment and hospital summary:  This patient is a 25 year old woman with history of bipolar one disorder and polysubstance use (methamphetamine, alcohol, cocaine, heroin, cannabis) who presented to ED via EMS with agitation and aggression in context of methamphetamine and heroin use and likely medication nonadherence. Her symptoms of responding to internal stimuli, aggression, and disorganization are consistent with a manic episode with psychotic features with potential effects of amphetamine intoxication and opioid withdrawal. She has a history of multiple similar presentations in the ED recently but her symptoms have resolved more quickly, suggesting that her symptoms this time may be more due to her bipolar disorder than substance-induced. Alicia has a history of multiple similar presentations at prior hospitalizations. She has done well in these hospitalizations with an antipsychotic and sometimes a mood stabilizer. She has also required commitment and Carrillo for several of these hospitalizations. Therefore, based on her history and current presentation, she would likely benefit from a mood stabilizer and an antipsychotic to target her manic symptoms, agitation, and psychosis.      Alicia continued to have agitation after admission and declined Depakote. Given her imminent risk to herself and others and poor insight into her mental health, a petition for commitment and Carrillo were sent. As she was declining Depakote and requesting PTA Lamictal, Depakote was stopped and Lamictal initiated. Discussed need for adherence and that Lamictal is not effective in jackie prevention. Alicia began taking Zyprexa 5mg TID two days after admission. Gabapentin PRN was started to target opioid withdrawal as she had low-normal blood pressures and this would therefore be preferred over clonidine for symptoms management.      On 1/25, patient postured toward staff in an aggressive manner. She required  "seclusion and emergency neuroleptic medications. Psychiatric emergency was declared on 1/26, and Zyprexa was increased, and backed up with IM. Propranolol was also added to target anxiety and suspected akathisia. On 1/28 patient was threatening staff and wanting to leave room to \"attack\" staff, was not redirectable, required use of seclusion.        Psychiatric treatment/inteventions:  Medications:   # Bipolar I Disorder:  -continue Lamictal 25mg daily (initiated on 1/20 per pt request). She understands that this is not first line treatment for jackie.   -continue Zyprexa 10 mg BID PO or IM.  Psychiatric emergency declared 1/26 as patient postured toward staff with the intent to assault them on 1/25. She may not decline medication.   -continue to discuss recommendation to start non-neuroleptic mood stabilizer outside of lamotrigine that would be first line for jackie, though patient has consistently declined due to concerns about weight gain.   -Continue propranolol 20 mg TID to target akathisia and anxiety  -continue hydroxyzine 25 mg q4h prn for acute anxiety  -continue melatonin PRN insomnia  -continue Zyprexa 10 mg q2h prn for severe agitation  -continue Haldol, Ativan, Benadryl PRN severe agitation/aggression     Laboratory/Imaging: Ordered repeat COVID per protocol given pt has been hospitalized >1 week and was negative on 1/28     Patient will be treated in therapeutic milieu with appropriate individual and group therapies as described.     Medical treatment/interventions:  Medical concerns:   # UTI, resolved:  -pt denying any further urinary symptoms   - completed treatment with Bactrim      # Migraines:  -continue PTA sumatriptan PRN     #Opioid Withdrawal, appears resolved:   -continue Gabapentin 300mg TID PRN opioid withdrawal symptoms  -continue PRN clonidine, Imodium, Zofran, Tylenol  -Discontinued COWS 1/22 given absence of significant withdrawal    #Dry Feet  -ordered PRN Eucerin cream, continue to " monitor     Because of behavior leading to a seclusion or restraint episode on 1/25, we have made the following change to the treatment plan: psychiatric emergency was declared 1/26 and pts Zyprexa was increased and backed up with IM. Team petitioning for commitment and Carrillo. Pt had another episode of seclusion on 1/28, medication being adjusted to help decrease agitation, commitment and Carrillo continues to be pursued.     This note was created by undersigned using a Dragon dictation system. All typing errors or contextual distortion are unintentional and software inherent.     Disposition Plan   Reason for ongoing admission: poses an imminent risk to self, poses an imminent risk to others and is unable to care for self due to severe psychosis or jackie  Discharge location: TBD, may pursue CARE given pts poor insight/judgement and severity of symptoms  Discharge Medications: not ordered  Follow-up Appointments: not scheduled  Legal Status: court hold  Entered by: Alicia Reveles on 2/1/2021 at 1:23 PM       Video-Visit Details    Type of service:  Video Visit    Video Start Time (time video started): 1343    Video End Time (time video stopped): 1357    Originating Location (pt. Location): [address]    Distant Location (provider location): Provider remote location    Mode of Communication:  Video Conference via Poly[employer]    Physician has received verbal consent for a Video Visit from the patient? Yes

## 2021-02-01 NOTE — PLAN OF CARE
Patient received court paperwork from her .  [facility] served patient this paperwork and informed patient to contact her . The 's  number was provided on paperwork that was given to patient.

## 2021-02-01 NOTE — PLAN OF CARE
Work Completed:  Attended Team Meeting: Reviewed chart notes: Completed the Referral Form for CARE Program and faxed it for their review.     Discharge plan or goal: CARE Program              Barriers to discharge:  Acuity of symptoms. Patient will have her final court hearing tomorrow. Recommendation is [facility] [place].

## 2021-02-02 PROCEDURE — 250N000013 HC RX MED GY IP 250 OP 250 PS 637: Performed by: STUDENT IN AN ORGANIZED HEALTH CARE EDUCATION/TRAINING PROGRAM

## 2021-02-02 PROCEDURE — 124N000002 HC R&B MH UMMC

## 2021-02-02 PROCEDURE — 99233 SBSQ HOSP IP/OBS HIGH 50: CPT | Mod: 95 | Performed by: PSYCHIATRY & NEUROLOGY

## 2021-02-02 PROCEDURE — 250N000013 HC RX MED GY IP 250 OP 250 PS 637: Performed by: PSYCHIATRY & NEUROLOGY

## 2021-02-02 RX ADMIN — LORAZEPAM 1 MG: 1 TABLET ORAL at 16:03

## 2021-02-02 RX ADMIN — NICOTINE POLACRILEX 4 MG: 2 GUM, CHEWING ORAL at 20:35

## 2021-02-02 RX ADMIN — HYDROXYZINE HYDROCHLORIDE 25 MG: 25 TABLET, FILM COATED ORAL at 13:47

## 2021-02-02 RX ADMIN — NICOTINE POLACRILEX 4 MG: 2 GUM, CHEWING ORAL at 19:25

## 2021-02-02 RX ADMIN — PROPRANOLOL HYDROCHLORIDE 20 MG: 20 TABLET ORAL at 20:35

## 2021-02-02 RX ADMIN — NICOTINE POLACRILEX 4 MG: 2 GUM, CHEWING ORAL at 13:47

## 2021-02-02 RX ADMIN — LAMOTRIGINE 25 MG: 25 TABLET ORAL at 08:00

## 2021-02-02 RX ADMIN — LEVONORGESTREL AND ETHINYL ESTRADIOL 1 TABLET: KIT at 08:01

## 2021-02-02 RX ADMIN — FLUTICASONE FUROATE 1 PUFF: 100 POWDER RESPIRATORY (INHALATION) at 08:01

## 2021-02-02 RX ADMIN — NICOTINE POLACRILEX 4 MG: 2 GUM, CHEWING ORAL at 17:57

## 2021-02-02 RX ADMIN — OLANZAPINE 10 MG: 5 TABLET, ORALLY DISINTEGRATING ORAL at 20:35

## 2021-02-02 RX ADMIN — GABAPENTIN 300 MG: 300 CAPSULE ORAL at 17:57

## 2021-02-02 RX ADMIN — OLANZAPINE 10 MG: 5 TABLET, ORALLY DISINTEGRATING ORAL at 08:00

## 2021-02-02 RX ADMIN — NICOTINE POLACRILEX 4 MG: 2 GUM, CHEWING ORAL at 16:03

## 2021-02-02 RX ADMIN — PROPRANOLOL HYDROCHLORIDE 20 MG: 20 TABLET ORAL at 13:47

## 2021-02-02 RX ADMIN — NICOTINE POLACRILEX 4 MG: 2 GUM, CHEWING ORAL at 12:53

## 2021-02-02 RX ADMIN — NICOTINE POLACRILEX 4 MG: 2 GUM, CHEWING ORAL at 11:14

## 2021-02-02 ASSESSMENT — ACTIVITIES OF DAILY LIVING (ADL)
LAUNDRY: UNABLE TO COMPLETE
ORAL_HYGIENE: INDEPENDENT
HYGIENE/GROOMING: INDEPENDENT
ORAL_HYGIENE: INDEPENDENT
DRESS: SCRUBS (BEHAVIORAL HEALTH)
DRESS: SCRUBS (BEHAVIORAL HEALTH)
HYGIENE/GROOMING: HANDWASHING;SHOWER;INDEPENDENT

## 2021-02-02 NOTE — PROGRESS NOTES
SPIRITUAL HEALTH SERVICES  SPIRITUAL ASSESSMENT Progress Note  [facility] [place] [place]     REFERRAL SOURCE: Spiritual Consult request    Brief visit with Alicia in support of her spiritual health. Alicia requested a 5th step which I explained that we could potentially do a 5th step later in her stay. She then started saying a prayer. After which she asked me to pray for her and then she thanked me for the call.     PLAN: I will continue to monitor Alicia and check in once a week to see if further support is desired.     Amandeep Dubon, [facility]  Staff   Pager [phone]

## 2021-02-02 NOTE — PLAN OF CARE
"Patient slept in this morning. She was in and out of her room the remainder of the shift.  She interacted minimally with others. Patient became agitated when talking with the MD via Polycom.  She was overheard swearing and yelling for an extended period of time.    Writer checked in with patient and asked her about suicide ideation.  She was slow to respond but when asked specifically is she was currently having suicidal thoughts she said \"Not right now but sometimes.\"  She was unwilling to answer further questions about SI/HI.  She did say that she would let staff know is she was feeling actively suicidal.  Patient has final court hearing this afternoon at 1500.    "

## 2021-02-02 NOTE — PLAN OF CARE
Problem: Behavior Regulation Impairment (Disruptive Behavior)  Goal: Improved Impulse and Aggression Control (Disruptive Behavior)  Outcome: No Change  Pt had one episode of agitating and irritable behaviors during this shift. She was on the phone talking with her mother and suddenly became agitated as [facility]  She slammed the phone  several times and screaming in the hallway. She is threatening to kill her primary doctor. Staff redirect her into her room as her behavior was threatening to peers.  She slammed her door several times and threatening to attack staff. PRN Zyprexa 5 mg given for agitation. PRN hydroxyzine 50 mg given earlier for anxiety.      Pt was restless, manic, and slightly disorganized for the entire evening. She cannot sit or focus on one task for longer than a few minutes. She spent most of this evening pacing back and forth the hallway. She complained of leg discomfort and requests for PRN Ativan; PRN Neurontin 300 mg was given instead.     She is alert and oriented. Insight into her mental health remains poor. She showered this evening. Her appetite is good; she is eating and drinking well. She took scheduled medication without any issue and denied her medication's side effects. She denied SI/SIB.

## 2021-02-02 NOTE — PLAN OF CARE
Work Completed:  Attended Team Meeting; Reviewed chart notes: Spoke with [facility] to provide requested medications for the Carrillo.     Discharge plan or goal:  CARE Program                Barriers to discharge:  Acuity of symptoms. Patient has her final court hearing today. Patient is continuing to stabilize.

## 2021-02-02 NOTE — PROGRESS NOTES
"[facility], [place]   Psychiatric Progress Note  Hospital Day: 15        Interim History:   The patient's care was discussed with the treatment team during the daily team meeting and/or staff's chart notes were reviewed.  Staff report patient last required seclusion on 1/28. She continues to exhibit irritability and agitation, usually prompted by not getting needs met immediately. She was agitated last evening after a phone conversation with her mother because \"she would be going to a lockdown treatment center.\" She threatened to kill this writer. She slammed her door several times and threatening to attack staff. PRN Zyprexa 5 mg given for agitation. PRN hydroxyzine 50 mg given earlier for anxiety.  Pt was restless, manic, and slightly disorganized for the entire evening. She cannot sit or focus on one task for longer than a few minutes.       Upon interview, patient reported feeling \"decent.\" She said that her energy level is normal and sleep is good. She denies racing thoughts. I asked her about her concerns that she may have a twin sister, and she said \"not anymore because my parents said I dont.\" However, she added that she feels her parents were \"forced not to say anything by law.\" When asked to elaborate, she said that her parents are in a witness protection program for war crimes dating back to WWII. She did not elaborate. She was easily agitated throughout the interview, especially when discussing ongoing symptoms and medication recommendations. She said \"You have stock in these meds, or what?! I am not taking any of them!\" She reported the following side effects from medications:    Abilify: \"made me fat and chavez sober\"  Rexulti: \"migraines and clenching teeth\"  Haldol: \"Made me more conscious of smells\"  Risperidone: \"more conscious of smells and fat\"  Depakote: \"made me fat\"  Lithium: \"fuck no! Made me fat!\"    Patient stated that if she was started on any of the above " "medications, she was \"fucking martín you!\" She also stated that she plans on suing \"arresting officers and [employer].\" At one point she screamed \"Give me pizza!\" She then terminated interview. She did note overall improvement in restlessness today, which she attributed to clonidine, hydroxyzine, and propranolol.          Medications:       - Psychiatric Emergency -   Does not apply See Admin Instructions     fluticasone  1 puff Inhalation Daily     lamoTRIgine  25 mg Oral Daily     levonorgestrel-ethinyl estradiol  1 tablet Oral Daily     OLANZapine zydis  10 mg Oral BID    Or     OLANZapine  10 mg Intramuscular BID     propranolol  20 mg Oral TID          Allergies:     Allergies   Allergen Reactions     Nkda [No Known Drug Allergies]           Labs:     No results found for this or any previous visit (from the past 48 hour(s)).       Psychiatric Examination:     /76   Pulse 102   Temp 98.3  F (36.8  C) (Tympanic)   Resp 16   Wt 58.4 kg (128 lb 12.8 oz)   SpO2 95%   BMI 20.17 kg/m    Weight is 128 lbs 12.8 oz  Body mass index is 20.17 kg/m .    Orthostatic Vitals     None          Appearance: awake, alert and untidy, lying in bed  Attitude:mostly uncooperative  Eye Contact:  fair  Mood:  \"decent\"   Affect:  mood congruent  Speech:  clear, coherent and normal prosody, less hyperverbal  Language: fluent and intact in English  Psychomotor, Gait, Musculoskeletal:  no evidence of tardive dyskinesia, dystonia, or tics, restless  Thought Process:  tangential  Associations:  no loose associations  Thought Content:  no evidence of suicidal ideation or homicidal ideation and denies AVH, paranoia present  Insight:  limited  Judgement:  limited  Oriented to:  time, person, and place  Attention Span and Concentration:  fair  Recent and Remote Memory:  improving, still limited around events leading to hospitalization   Fund of Knowledge:  appropriate    Clinical Global Impressions  First:  Considering your total clinical " experience with this particular patient population, how severe are the patient's symptoms at this time?: 6 (01/21/21 1437)  Compared to the patient's condition at the START of treatment, this patient's condition is: 3 (01/21/21 1437)  Most recent:  Considering your total clinical experience with this particular patient population, how severe are the patient's symptoms at this time?: 7 (01/29/21 1522)  Compared to the patient's condition at the START of treatment, this patient's condition is: 4 (01/29/21 1522)           Precautions:     Behavioral Orders   Procedures     Assault precautions     Code 1 - Restrict to Unit     Elopement precautions     Fall precautions     Routine Programming     As clinically indicated     Status 15     Every 15 minutes.          Diagnoses:     Bipolar I Disorder, current episode manic  Amphetamine use disorder, severe  Cannabis use disorder  Akathisia   Foot pain/dry skin         Assessment & Plan:   Assessment and hospital summary:  This patient is a 25 year old woman with history of bipolar one disorder and polysubstance use (methamphetamine, alcohol, cocaine, heroin, cannabis) who presented to ED via EMS with agitation and aggression in context of methamphetamine and heroin use and likely medication nonadherence. Her symptoms of responding to internal stimuli, aggression, and disorganization are consistent with a manic episode with psychotic features with potential effects of amphetamine intoxication and opioid withdrawal. She has a history of multiple similar presentations in the ED recently but her symptoms have resolved more quickly, suggesting that her symptoms this time may be more due to her bipolar disorder than substance-induced. Alicia has a history of multiple similar presentations at prior hospitalizations. She has done well in these hospitalizations with an antipsychotic and sometimes a mood stabilizer. She has also required commitment and Carrillo for several of these  "hospitalizations. Therefore, based on her history and current presentation, she would likely benefit from a mood stabilizer and an antipsychotic to target her manic symptoms, agitation, and psychosis.      Alicia continued to have agitation after admission and declined Depakote. Given her imminent risk to herself and others and poor insight into her mental health, a petition for commitment and Carrillo were sent. As she was declining Depakote and requesting PTA Lamictal, Depakote was stopped and Lamictal initiated. Discussed need for adherence and that Lamictal is not effective in jackie prevention. Alicia began taking Zyprexa 5mg TID two days after admission. Gabapentin PRN was started to target opioid withdrawal as she had low-normal blood pressures and this would therefore be preferred over clonidine for symptoms management.      On 1/25, patient postured toward staff in an aggressive manner. She required seclusion and emergency neuroleptic medications. Psychiatric emergency was declared on 1/26, and Zyprexa was increased, and backed up with IM. Propranolol was also added to target anxiety and suspected akathisia. On 1/28 patient was threatening staff and wanting to leave room to \"attack\" staff, was not redirectable, required use of seclusion.        Psychiatric treatment/inteventions:  Medications:   # Bipolar I Disorder:  -continue Lamictal 25mg daily (initiated on 1/20 per pt request). She understands that this is not first line treatment for jackie.   -continue Zyprexa 10 mg BID PO or IM.  Psychiatric emergency declared 1/26 as patient postured toward staff with the intent to assault them on 1/25. She may not decline medication.   -continue to discuss recommendation to start non-neuroleptic mood stabilizer outside of lamotrigine that would be first line for jackie, though patient has consistently declined due to concerns about weight gain.   -Continue propranolol 20 mg TID to target akathisia and " anxiety  -continue hydroxyzine 25-50 mg q4h prn for acute anxiety  -continue melatonin PRN insomnia  -continue Zyprexa 10 mg q2h prn for severe agitation  -continue Haldol, Ativan, Benadryl PRN severe agitation/aggression     Laboratory/Imaging: Ordered repeat COVID per protocol given pt has been hospitalized >1 week and was negative on 1/28     Patient will be treated in therapeutic milieu with appropriate individual and group therapies as described.     Medical treatment/interventions:  Medical concerns:   # UTI, resolved:  -pt denying any further urinary symptoms   - completed treatment with Bactrim      # Migraines:  -continue PTA sumatriptan PRN     #Opioid Withdrawal, appears resolved:   -continue Gabapentin 300mg TID PRN opioid withdrawal symptoms  -continue PRN clonidine, Imodium, Zofran, Tylenol  -Discontinued COWS 1/22 given absence of significant withdrawal    #Dry Feet  -ordered PRN Eucerin cream, continue to monitor     Because of behavior leading to a seclusion or restraint episode on 1/25, we have made the following change to the treatment plan: psychiatric emergency was declared 1/26 and pts Zyprexa was increased and backed up with IM. Team petitioning for commitment and Carrillo. Pt had another episode of seclusion on 1/28, medication being adjusted to help decrease agitation, commitment and Carrillo continues to be pursued.     This note was created by undersigned using a Dragon dictation system. All typing errors or contextual distortion are unintentional and software inherent.     Disposition Plan   Reason for ongoing admission: poses an imminent risk to self, poses an imminent risk to others and is unable to care for self due to severe psychosis or jackie  Discharge location: TBD, may pursue CARE given pts poor insight/judgement and severity of symptoms  Discharge Medications: not ordered  Follow-up Appointments: not scheduled  Legal Status: court hold  Entered by: Alicia Reveles on 2/2/2021 at  1:55 PM       Video-Visit Details    Type of service:  Video Visit    Video Start Time (time video started): 1147    Video End Time (time video stopped): 1158    Originating Location (pt. Location): [address]    Distant Location (provider location): Provider remote location    Mode of Communication:  Video Conference via [employer]    Physician has received verbal consent for a Video Visit from the patient? Yes

## 2021-02-02 NOTE — PLAN OF CARE
Pt in bed sleeping at start of shift. Breathing quiet and unlabored. Appeared to be sleeping for 5.25 hours during the night.     On  FALL, ASSAULT, and ELOPEMENT precautions in addition to Single Room order, with no related events occurring this shift.     Will continue to monitor and assess.   Problem: Sleep Disturbance (Disruptive Behavior)  Goal: Improved Sleep (Disruptive Behavior)  Outcome: No Change

## 2021-02-03 PROCEDURE — 250N000013 HC RX MED GY IP 250 OP 250 PS 637: Performed by: PSYCHIATRY & NEUROLOGY

## 2021-02-03 PROCEDURE — G0177 OPPS/PHP; TRAIN & EDUC SERV: HCPCS

## 2021-02-03 PROCEDURE — 250N000013 HC RX MED GY IP 250 OP 250 PS 637: Performed by: STUDENT IN AN ORGANIZED HEALTH CARE EDUCATION/TRAINING PROGRAM

## 2021-02-03 PROCEDURE — 124N000002 HC R&B MH UMMC

## 2021-02-03 PROCEDURE — 250N000011 HC RX IP 250 OP 636: Performed by: PSYCHIATRY & NEUROLOGY

## 2021-02-03 PROCEDURE — 99233 SBSQ HOSP IP/OBS HIGH 50: CPT | Mod: 95 | Performed by: PSYCHIATRY & NEUROLOGY

## 2021-02-03 RX ORDER — LAMOTRIGINE 25 MG/1
50 TABLET ORAL DAILY
Status: DISCONTINUED | OUTPATIENT
Start: 2021-02-04 | End: 2021-02-19

## 2021-02-03 RX ADMIN — OLANZAPINE 10 MG: 5 TABLET, ORALLY DISINTEGRATING ORAL at 19:22

## 2021-02-03 RX ADMIN — MELATONIN TAB 3 MG 3 MG: 3 TAB at 21:08

## 2021-02-03 RX ADMIN — OLANZAPINE 10 MG: 5 TABLET, ORALLY DISINTEGRATING ORAL at 09:02

## 2021-02-03 RX ADMIN — HYDROXYZINE HYDROCHLORIDE 25 MG: 25 TABLET, FILM COATED ORAL at 21:08

## 2021-02-03 RX ADMIN — PROPRANOLOL HYDROCHLORIDE 20 MG: 20 TABLET ORAL at 14:51

## 2021-02-03 RX ADMIN — GABAPENTIN 300 MG: 300 CAPSULE ORAL at 00:36

## 2021-02-03 RX ADMIN — LAMOTRIGINE 25 MG: 25 TABLET ORAL at 09:02

## 2021-02-03 RX ADMIN — LEVONORGESTREL AND ETHINYL ESTRADIOL 1 TABLET: KIT at 09:04

## 2021-02-03 RX ADMIN — NICOTINE POLACRILEX 4 MG: 2 GUM, CHEWING ORAL at 19:29

## 2021-02-03 RX ADMIN — NICOTINE POLACRILEX 4 MG: 2 GUM, CHEWING ORAL at 09:38

## 2021-02-03 RX ADMIN — DIPHENHYDRAMINE HYDROCHLORIDE 50 MG: 50 CAPSULE ORAL at 23:47

## 2021-02-03 RX ADMIN — LORAZEPAM 2 MG: 2 INJECTION INTRAMUSCULAR; INTRAVENOUS at 12:40

## 2021-02-03 RX ADMIN — PROPRANOLOL HYDROCHLORIDE 20 MG: 20 TABLET ORAL at 09:05

## 2021-02-03 RX ADMIN — LORAZEPAM 1 MG: 1 TABLET ORAL at 01:55

## 2021-02-03 RX ADMIN — NICOTINE POLACRILEX 4 MG: 2 GUM, CHEWING ORAL at 21:08

## 2021-02-03 RX ADMIN — HALOPERIDOL LACTATE 5 MG: 5 INJECTION, SOLUTION INTRAMUSCULAR at 12:40

## 2021-02-03 RX ADMIN — DIPHENHYDRAMINE HYDROCHLORIDE 50 MG: 50 INJECTION, SOLUTION INTRAMUSCULAR; INTRAVENOUS at 12:40

## 2021-02-03 RX ADMIN — PROPRANOLOL HYDROCHLORIDE 20 MG: 20 TABLET ORAL at 19:22

## 2021-02-03 RX ADMIN — NICOTINE POLACRILEX 4 MG: 2 GUM, CHEWING ORAL at 14:51

## 2021-02-03 RX ADMIN — FLUTICASONE FUROATE 1 PUFF: 100 POWDER RESPIRATORY (INHALATION) at 09:05

## 2021-02-03 ASSESSMENT — ACTIVITIES OF DAILY LIVING (ADL)
HYGIENE/GROOMING: INDEPENDENT
DRESS: SCRUBS (BEHAVIORAL HEALTH)
ORAL_HYGIENE: INDEPENDENT
LAUNDRY: UNABLE TO COMPLETE

## 2021-02-03 NOTE — PLAN OF CARE
Work Completed: Attended Team Meeting: Reviewed Chart Notes/    Discharge plan or goal:  CARE Program                Barriers to discharge: Patient is in the civil commitment process: Acuity of symptoms and second Carrillo Court Date @ 2/10/21

## 2021-02-03 NOTE — PROVIDER NOTIFICATION
"   02/03/21 1245   Seclusion or Restraint Order   In Person Face to Face Assessment Conducted Yes-Eval of pt's immediate situation, reaction to intervention, complete review of systems assessment, behavioral assessment & review/assessment of hx, drugs & meds, recent labs, etc, behavioral condition, need to continue/terminate restraint/seclusion   Patient Experienced No adverse physical outcome from seclusion/restraint initiation   Continuation of Seclus/Restraint indicated at this time Yes   Pt agitated, screaming obscenities when writer attempts to complete face to face assessment. \"I asked her three fucking times for ativan and gum!\" Pt shouting so intensely that spit is spraying onto the seclusion room window. Pt refusing to answer any face to face assessment questions. No visible signs of injury after walking to seclusion with [facility] team. All medication orders, allergies, Physician orders, and lab results reviewed. Dr Reveles notified of face to face results.  "
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
## 2021-02-03 NOTE — PROGRESS NOTES
Writer received a voice message from [facility] Pre-Petition Screener stating patient is on a full MICD Commitment but Court is requesting another Carrillo Hearing:  Carrillo Hearin/10/21 @ 1:30pm:  Email was sent to all Court TV Schedulers.

## 2021-02-03 NOTE — PROGRESS NOTES
"Patient was in lounge and began yelling and swearing while on Polycom talking to her doctor.  She walked away from the IPad and continued yelling and began threatening staff. \"I'm going to kill you.\"  She was directed to go to her room. Pt was demanding nicotine gum and Ativan.  She was told that given her behavior her only option at that time was to go to her room and follow staff directions. She went toward her room but then turned around and moved within inches of a staff member and again said \"I'm going to kill you.\"  She was screaming so loud that she could be heard on [address].  Pt continued to threaten staff and refuse to go to her room. Decision was made to place patient in seclusion.  Pt continued to get in the face of staff as they waked toward the seclusion room. She followed them into the seclusion room.  She was yelling so loud that spit was coming out of her mouth as she yelled.She continued to threaten and say \"I'm going to kill you.\" Pt was pounding on the seclusion room door. She was given IM Haldol, Ativan and Benadryl.  She stated she was allergic to haldol but this is not accurate. She has been on Haldol successfully in the past.  Patient eventually went to sleep in seclusion room.  She was easily awakened but refused to participate in debriefing.  With encouragement she finally acknowledged what prompted seclusion.  She was then escorted to her room.  No further issues remainder of shft.  "
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
## 2021-02-03 NOTE — PROGRESS NOTES
Patient has been agitated, somewhat irritable, pacing out in the hallway with her headphones on and singing loudly.  Speech rapid and pressured, poor concentration, and frequent & multiple requests. Frequently forgetting what she had asked for.  Patient already on one request at the top of the hour.  Patient given Ativan 1 mg po (had received Gabapentin PRN earlier for c/o foot pain - bottom of bilateral feet) at this time and attempted to settle patient in her room.

## 2021-02-04 PROCEDURE — 99233 SBSQ HOSP IP/OBS HIGH 50: CPT | Mod: 95 | Performed by: PSYCHIATRY & NEUROLOGY

## 2021-02-04 PROCEDURE — 250N000013 HC RX MED GY IP 250 OP 250 PS 637: Performed by: PSYCHIATRY & NEUROLOGY

## 2021-02-04 PROCEDURE — 124N000002 HC R&B MH UMMC

## 2021-02-04 PROCEDURE — 250N000013 HC RX MED GY IP 250 OP 250 PS 637: Performed by: STUDENT IN AN ORGANIZED HEALTH CARE EDUCATION/TRAINING PROGRAM

## 2021-02-04 PROCEDURE — G0177 OPPS/PHP; TRAIN & EDUC SERV: HCPCS

## 2021-02-04 PROCEDURE — 87635 SARS-COV-2 COVID-19 AMP PRB: CPT | Performed by: PSYCHIATRY & NEUROLOGY

## 2021-02-04 RX ADMIN — PROPRANOLOL HYDROCHLORIDE 20 MG: 20 TABLET ORAL at 14:13

## 2021-02-04 RX ADMIN — LAMOTRIGINE 50 MG: 25 TABLET ORAL at 07:50

## 2021-02-04 RX ADMIN — FLUTICASONE FUROATE 1 PUFF: 100 POWDER RESPIRATORY (INHALATION) at 07:54

## 2021-02-04 RX ADMIN — NICOTINE POLACRILEX 4 MG: 2 GUM, CHEWING ORAL at 06:43

## 2021-02-04 RX ADMIN — NICOTINE POLACRILEX 4 MG: 2 GUM, CHEWING ORAL at 10:29

## 2021-02-04 RX ADMIN — PROPRANOLOL HYDROCHLORIDE 20 MG: 20 TABLET ORAL at 07:50

## 2021-02-04 RX ADMIN — PROPRANOLOL HYDROCHLORIDE 20 MG: 20 TABLET ORAL at 19:00

## 2021-02-04 RX ADMIN — NICOTINE POLACRILEX 4 MG: 2 GUM, CHEWING ORAL at 14:17

## 2021-02-04 RX ADMIN — NICOTINE POLACRILEX 4 MG: 2 GUM, CHEWING ORAL at 15:22

## 2021-02-04 RX ADMIN — NICOTINE POLACRILEX 4 MG: 2 GUM, CHEWING ORAL at 18:09

## 2021-02-04 RX ADMIN — LORAZEPAM 1 MG: 1 TABLET ORAL at 10:29

## 2021-02-04 RX ADMIN — OLANZAPINE 10 MG: 5 TABLET, ORALLY DISINTEGRATING ORAL at 19:03

## 2021-02-04 RX ADMIN — GABAPENTIN 300 MG: 300 CAPSULE ORAL at 19:52

## 2021-02-04 RX ADMIN — LEVONORGESTREL AND ETHINYL ESTRADIOL 1 TABLET: KIT at 07:54

## 2021-02-04 RX ADMIN — DIPHENHYDRAMINE HYDROCHLORIDE 50 MG: 50 CAPSULE ORAL at 19:54

## 2021-02-04 RX ADMIN — OLANZAPINE 10 MG: 5 TABLET, ORALLY DISINTEGRATING ORAL at 07:50

## 2021-02-04 RX ADMIN — NICOTINE POLACRILEX 4 MG: 2 GUM, CHEWING ORAL at 19:55

## 2021-02-04 RX ADMIN — MELATONIN TAB 3 MG 3 MG: 3 TAB at 19:52

## 2021-02-04 ASSESSMENT — ACTIVITIES OF DAILY LIVING (ADL)
ORAL_HYGIENE: INDEPENDENT
HYGIENE/GROOMING: INDEPENDENT
ORAL_HYGIENE: INDEPENDENT
DRESS: SCRUBS (BEHAVIORAL HEALTH)
DRESS: SCRUBS (BEHAVIORAL HEALTH)
LAUNDRY: UNABLE TO COMPLETE
HYGIENE/GROOMING: INDEPENDENT

## 2021-02-04 NOTE — PROGRESS NOTES
"[facility], [place]   Psychiatric Progress Note  Hospital Day: 17        Interim History:   The patient's care was discussed with the treatment team during the daily team meeting and/or staff's chart notes were reviewed.  Staff report patient patient was in seclusion during day shift yesterday after becoming very agitated and threatening to kill staff. She denied SI, SIB, and HI later in the evening. She denied AH/VH. Rated depression a 4/10 in severity. Eating and drinking well. Has some difficulty sleeping at times, slept 6 hours.     Upon interview, patient said that she feels \"decent.\" She said that she was upset with this writer because \"I would not be on those meds, never!\" in reference to medications listed on proposed Carrillo. I explained that I will not be prescribing all medications listed on the Carrillo. She said \"Well I am allergic to Haldol! It makes me conscious of smells and hot!\" When asked to elaborate about smells, she said \"I thought my boyfriend was fucking the bitch behind the counter and all I could smell were her pits.\" She said that she wants to continue with Zyprexa because it helps with \"calmness.\" She said that she feels she is at baseline despite objective evidence of ongoing manic symptoms. She continues to express concerns about her parents and having a twin sister. She said that she suspects her parents had children before they had her, or \"had some kind of closed adoption that they cant tell me about because it is against the law.\" She then abruptly terminated the interview and walked out of the room. She informed staff that she was finished speaking with writer. She had no further requests or concerns.            Medications:       - Psychiatric Emergency -   Does not apply See Admin Instructions     fluticasone  1 puff Inhalation Daily     lamoTRIgine  50 mg Oral Daily     levonorgestrel-ethinyl estradiol  1 tablet Oral Daily     OLANZapine zydis  10 mg " "Oral BID    Or     OLANZapine  10 mg Intramuscular BID     propranolol  20 mg Oral TID          Allergies:     Allergies   Allergen Reactions     Nkda [No Known Drug Allergies]           Labs:     No results found for this or any previous visit (from the past 48 hour(s)).       Psychiatric Examination:     /80 (BP Location: Right arm)   Pulse 96   Temp 99.3  F (37.4  C) (Tympanic)   Resp 16   Wt 58.4 kg (128 lb 12.8 oz)   SpO2 98%   BMI 20.17 kg/m    Weight is 128 lbs 12.8 oz  Body mass index is 20.17 kg/m .    Orthostatic Vitals     None          Appearance: awake, alert, adequately groomed  Attitude: more cooperative though still irritable  Eye Contact:  fair  Mood:  \"decent\"   Affect:  mood congruent, intensity is heightened, labile  Speech:  clear, coherent and normal prosody, raised volume  Language: fluent and intact in English  Psychomotor, Gait, Musculoskeletal:  no evidence of tardive dyskinesia, dystonia, or tics, restless  Thought Process:  tangential  Associations:  no loose associations  Thought Content:  no evidence of suicidal ideation or homicidal ideation and denies AVH, paranoia present  Insight:  limited  Judgement:  limited  Oriented to:  time, person, and place  Attention Span and Concentration:  fair  Recent and Remote Memory:  improving, still limited around events leading to hospitalization   Fund of Knowledge:  appropriate    Clinical Global Impressions  First:  Considering your total clinical experience with this particular patient population, how severe are the patient's symptoms at this time?: 6 (01/21/21 1437)  Compared to the patient's condition at the START of treatment, this patient's condition is: 3 (01/21/21 1437)  Most recent:  Considering your total clinical experience with this particular patient population, how severe are the patient's symptoms at this time?: 7 (01/29/21 1522)  Compared to the patient's condition at the START of treatment, this patient's condition " is: 4 (01/29/21 1522)           Precautions:     Behavioral Orders   Procedures     Assault precautions     Code 1 - Restrict to Unit     Elopement precautions     Fall precautions     Routine Programming     As clinically indicated     Status 15     Every 15 minutes.          Diagnoses:     Bipolar I Disorder, current episode manic  Amphetamine use disorder, severe  Cannabis use disorder  Akathisia   Foot pain/dry skin         Assessment & Plan:     Assessment and hospital summary:  This patient is a 25 year old woman with history of bipolar one disorder and polysubstance use (methamphetamine, alcohol, cocaine, heroin, cannabis) who presented to ED via EMS with agitation and aggression in context of methamphetamine and heroin use and likely medication nonadherence. Her symptoms of responding to internal stimuli, aggression, and disorganization are consistent with a manic episode with psychotic features with potential effects of amphetamine intoxication and opioid withdrawal. She has a history of multiple similar presentations in the ED recently but her symptoms have resolved more quickly, suggesting that her symptoms this time may be more due to her bipolar disorder than substance-induced. Alicia has a history of multiple similar presentations at prior hospitalizations. She has done well in these hospitalizations with an antipsychotic and sometimes a mood stabilizer. She has also required commitment and Carrillo for several of these hospitalizations. Therefore, based on her history and current presentation, she would likely benefit from a mood stabilizer and an antipsychotic to target her manic symptoms, agitation, and psychosis.      Alicia continued to have agitation after admission and declined Depakote. Given her imminent risk to herself and others and poor insight into her mental health, a petition for commitment and Carrillo were sent. As she was declining Depakote and requesting PTA Lamictal, Depakote was  "stopped and Lamictal initiated. Discussed need for adherence and that Lamictal is not effective in jackie prevention. Alicia began taking Zyprexa 5mg TID two days after admission. Gabapentin PRN was started to target opioid withdrawal as she had low-normal blood pressures and this would therefore be preferred over clonidine for symptoms management.      On 1/25, patient postured toward staff in an aggressive manner. She required seclusion and emergency neuroleptic medications. Psychiatric emergency was declared on 1/26, and Zyprexa was increased, and backed up with IM. Propranolol was also added to target anxiety and suspected akathisia. On 1/28 patient was threatening staff and wanting to leave room to \"attack\" staff, was not redirectable, required use of seclusion.        Psychiatric treatment/inteventions:  Medications:   # Bipolar I Disorder:  -Increase Lamictal to 50mg daily (initiated on 1/20 per pt request). She understands that this is not first line treatment for jackie.   -continue Zyprexa 10 mg BID PO or IM.  Psychiatric emergency declared 1/26 as patient postured toward staff with the intent to assault them on 1/25. She may not decline medication.   -continue to discuss recommendation to start non-neuroleptic mood stabilizer outside of lamotrigine that would be first line for jackie, though patient has consistently declined due to concerns about weight gain.   -Continue propranolol 20 mg TID to target akathisia and anxiety  -continue hydroxyzine 25-50 mg q4h prn for acute anxiety  -continue melatonin PRN insomnia  -continue Zyprexa 10 mg q2h prn for severe agitation  -continue Haldol, Ativan, Benadryl PRN severe agitation/aggression     Laboratory/Imaging: Ordered repeat COVID per protocol given pt has been hospitalized >1 week      Patient will be treated in therapeutic milieu with appropriate individual and group therapies as described.     Medical treatment/interventions:  Medical concerns:   # UTI, " resolved:  -pt denying any further urinary symptoms   - completed treatment with Bactrim      # Migraines:  -continue PTA sumatriptan PRN     #Opioid Withdrawal, appears resolved:   -continue Gabapentin 300mg TID PRN opioid withdrawal symptoms  -continue PRN clonidine, Imodium, Zofran, Tylenol  -Discontinued COWS 1/22 given absence of significant withdrawal    #Dry Feet  -ordered PRN Eucerin cream, continue to monitor     Because of behavior leading to a seclusion or restraint episode on 1/25, we have made the following change to the treatment plan: psychiatric emergency was declared 1/26 and pts Zyprexa was increased and backed up with IM. Team petitioning for commitment and Carrillo. Pt had another episode of seclusion on 1/28, medication being adjusted to help decrease agitation, commitment and Carrillo continues to be pursued.     This note was created by undersigned using a Dragon dictation system. All typing errors or contextual distortion are unintentional and software inherent.     Disposition Plan   Reason for ongoing admission: poses an imminent risk to self, poses an imminent risk to others and is unable to care for self due to severe psychosis or jackie  Discharge location: TBD, may pursue CARE given pts poor insight/judgement and severity of symptoms  Discharge Medications: not ordered  Follow-up Appointments: not scheduled  Legal Status: court hold     Entered by: Alicia Reveles on 2/4/2021 at 8:21 AM       Video-Visit Details    Type of service:  Video Visit    Video Start Time (time video started): 1141    Video End Time (time video stopped): 1151    Originating Location (pt. Location): [address]    Distant Location (provider location): Provider remote location    Mode of Communication:  Video Conference via [employer]    Physician has received verbal consent for a Video Visit from the patient? Yes

## 2021-02-04 NOTE — PLAN OF CARE
Problem: Mood Impairment (Disruptive Behavior)  Goal: Improved Mood Symptoms (Disruptive Behavior)  Outcome: Improving  Flowsheets (Taken 2/4/2021 2951)  Mutually Determined Action Steps (Improved Mood Symptoms): (asks for PRN medication)   identifies anger feelings   other (see comments)    SI/Self harm: pt denies   Aggression/agitation/HI:  restless on the unit, not aggressive or threatening  AVH:  none, does not appear to be responding  Sleep: through the night  PRN Med: ativan for anxiety/agitation at 1030  Medication AE: none reported or observed  Physical Complaints/Issues: none  I & O: eating and drinking well  ADLs: independent   Vitals:  stable and WNL  COVID 19 Assessment: pt tested negative on admission and shows no s/s of COVID19 infection    Milieu Participation: minimal. Pt paces the hallway listening to music, is in and out of the milieu and her room most of the shift.  Behavior: restless, loud speech at times.

## 2021-02-04 NOTE — PLAN OF CARE
Problem: Suicidal Behavior  Goal: Suicidal Behavior is Absent or Managed  Outcome: Improving    Pt observed in her room resting or sleeping most of the shift. Appeared disheveled. Encouraged to shower. Pt took a shower. Presented with sad affect and depressed mood. Denies SI/SIB/HI. Denies experiencing any type of hallucinations. Denies anxiety as of time of assessment but reports to be having depression. Rated it as 4.     Consumed 75% of share of dinner an took in approximately 600cc of fluids.   Due medications given. Denies experiencing any side effects of medications. At 2100H, claimed she has difficulty sleeping and having anxiety, rated it as 4. Pt observed in the hallway pacing, with headphones. PRN Melatonin and Hydroxyzine given per request.   Needs attended to. No further concerns noted as of this time.   /75 (BP Location: Left arm)   Pulse 91   Temp 97.9  F (36.6  C) (Tympanic)   Resp 18   Wt 58.4 kg (128 lb 12.8 oz)   SpO2 97%   BMI 20.17 kg/m

## 2021-02-04 NOTE — PLAN OF CARE
Work Completed:Attended Team meeting.  REcieved phone call from [facility]- they want 10 days of records- she has been referred by her case manger.  FAX'd records [phone]    Discharge plan or goal: Discharge to CARE facility when Pt is stable and afte her 2nd court hearing which she has requested- 2/10/21. 1:30 PM.                Barriers to discharge: Pt is not stable, is not taking all of her medicine, and has requested a 2nd court date.

## 2021-02-04 NOTE — PLAN OF CARE
Pt received Benadryl 50mg and a large snack for restlessness per her request at start of shift. Breathing quiet and unlabored.     Appeared to be sleeping for 6 hours during the night.     Pt on FALL, ASSAULT, and ELOPEMENT precautions in addition to single room order. Any related events noted above.     Will continue to monitor and assess.   Problem: Sleep Disturbance (Disruptive Behavior)  Goal: Improved Sleep (Disruptive Behavior)  Outcome: Improving

## 2021-02-05 PROCEDURE — 250N000013 HC RX MED GY IP 250 OP 250 PS 637: Performed by: PSYCHIATRY & NEUROLOGY

## 2021-02-05 PROCEDURE — 99233 SBSQ HOSP IP/OBS HIGH 50: CPT | Mod: 95 | Performed by: PSYCHIATRY & NEUROLOGY

## 2021-02-05 PROCEDURE — 124N000002 HC R&B MH UMMC

## 2021-02-05 PROCEDURE — 250N000013 HC RX MED GY IP 250 OP 250 PS 637: Performed by: STUDENT IN AN ORGANIZED HEALTH CARE EDUCATION/TRAINING PROGRAM

## 2021-02-05 RX ORDER — OLANZAPINE 10 MG/2ML
10 INJECTION, POWDER, FOR SOLUTION INTRAMUSCULAR 2 TIMES DAILY
Status: DISCONTINUED | OUTPATIENT
Start: 2021-02-05 | End: 2021-03-24 | Stop reason: HOSPADM

## 2021-02-05 RX ADMIN — PROPRANOLOL HYDROCHLORIDE 20 MG: 20 TABLET ORAL at 19:26

## 2021-02-05 RX ADMIN — OLANZAPINE 15 MG: 10 TABLET, ORALLY DISINTEGRATING ORAL at 19:26

## 2021-02-05 RX ADMIN — NICOTINE POLACRILEX 4 MG: 2 GUM, CHEWING ORAL at 08:50

## 2021-02-05 RX ADMIN — HYDROXYZINE HYDROCHLORIDE 50 MG: 25 TABLET, FILM COATED ORAL at 14:48

## 2021-02-05 RX ADMIN — LORAZEPAM 1 MG: 1 TABLET ORAL at 03:48

## 2021-02-05 RX ADMIN — NICOTINE POLACRILEX 4 MG: 2 GUM, CHEWING ORAL at 16:48

## 2021-02-05 RX ADMIN — NICOTINE POLACRILEX 4 MG: 2 GUM, CHEWING ORAL at 18:13

## 2021-02-05 RX ADMIN — DIPHENHYDRAMINE HYDROCHLORIDE 50 MG: 50 CAPSULE ORAL at 20:24

## 2021-02-05 RX ADMIN — DIPHENHYDRAMINE HYDROCHLORIDE 50 MG: 50 CAPSULE ORAL at 03:35

## 2021-02-05 RX ADMIN — LORAZEPAM 1 MG: 1 TABLET ORAL at 10:29

## 2021-02-05 RX ADMIN — GABAPENTIN 300 MG: 300 CAPSULE ORAL at 20:24

## 2021-02-05 RX ADMIN — NICOTINE POLACRILEX 4 MG: 2 GUM, CHEWING ORAL at 14:13

## 2021-02-05 RX ADMIN — NICOTINE POLACRILEX 4 MG: 2 GUM, CHEWING ORAL at 21:55

## 2021-02-05 RX ADMIN — FLUTICASONE FUROATE 1 PUFF: 100 POWDER RESPIRATORY (INHALATION) at 08:42

## 2021-02-05 RX ADMIN — NICOTINE POLACRILEX 4 MG: 2 GUM, CHEWING ORAL at 10:29

## 2021-02-05 RX ADMIN — PROPRANOLOL HYDROCHLORIDE 20 MG: 20 TABLET ORAL at 14:35

## 2021-02-05 RX ADMIN — OLANZAPINE 5 MG: 5 TABLET, FILM COATED ORAL at 16:48

## 2021-02-05 RX ADMIN — LAMOTRIGINE 50 MG: 25 TABLET ORAL at 08:16

## 2021-02-05 RX ADMIN — LEVONORGESTREL AND ETHINYL ESTRADIOL 1 TABLET: KIT at 08:42

## 2021-02-05 RX ADMIN — OLANZAPINE 10 MG: 5 TABLET, ORALLY DISINTEGRATING ORAL at 08:16

## 2021-02-05 RX ADMIN — PROPRANOLOL HYDROCHLORIDE 20 MG: 20 TABLET ORAL at 08:50

## 2021-02-05 ASSESSMENT — ACTIVITIES OF DAILY LIVING (ADL)
LAUNDRY: UNABLE TO COMPLETE
DRESS: SCRUBS (BEHAVIORAL HEALTH)
DRESS: SCRUBS (BEHAVIORAL HEALTH);INDEPENDENT
ORAL_HYGIENE: INDEPENDENT
ORAL_HYGIENE: INDEPENDENT
HYGIENE/GROOMING: INDEPENDENT
LAUNDRY: UNABLE TO COMPLETE
HYGIENE/GROOMING: SHOWER;INDEPENDENT

## 2021-02-05 NOTE — PLAN OF CARE
Work Completed: Team meeting    Chart review:  Continues to be delusional/labile.  Not getting better on Zyprexa and refusing other medications.  Pt contested her Carrillo-has a 2nd hearing on Wed 2/10 @ 1:30pm. Court TV is ordered.  She has an examination for these proceedings this afternoon (Friday), by phone, at 4pm.  She has been referred for CARE program by CM.  Records were last sent on Thu 2/5.    AVS: Started    Discharge plan or goal: TBD pending court proceedings and placement availability at a CARE facility                Barriers to discharge: Sx stabilization, court proceedings, placement

## 2021-02-05 NOTE — PLAN OF CARE
Problem: Behavior Regulation Impairment (Disruptive Behavior)  Goal: Improved Impulse and   Aggression Control (Disruptive Behavior)  Outcome: No Change   Patient restless, pacing, hyper-verbal, loud, labile require redirection on disruptive behavior and inappropriate laughing at another female peer that requested to see the movie Apocalypse. Prns help temporarily, takes medications including prn benadryl, gabapentin, melatonin and nicotine gum. No aggression or elopement attempts.

## 2021-02-05 NOTE — PROGRESS NOTES
"[facility], [place]   Psychiatric Progress Note  Hospital Day: 18        Interim History:   The patient's care was discussed with the treatment team during the daily team meeting and/or staff's chart notes were reviewed.  Staff report patient remains irritable, labile, restless with minimal insight into her mental health. She remains unchanged with ongoing symptoms of psychosis and jackie. She does not appear to be responding to scheduled Zyprexa.     Upon interview, patient asked \"So where do I fall on the list for treatment?\" I explained that we do not know at this time, but would update her when we have that information. She said that she thinks we should lie and tell CARE programs that she is a \"hypodermic needle user.\" She said that she is willing to go to CARE but feels like she is just a number when she is there. She has been to [facility] in the past. She became abruptly agitated when I again could not provide a specific answer about timeline/discharge date. She called this writer a \"dummy,\" \"worthless,\" and \"bitch.\" Interview was subsequently terminated.          Medications:       - Psychiatric Emergency -   Does not apply See Admin Instructions     fluticasone  1 puff Inhalation Daily     lamoTRIgine  50 mg Oral Daily     levonorgestrel-ethinyl estradiol  1 tablet Oral Daily     OLANZapine zydis  10 mg Oral BID    Or     OLANZapine  10 mg Intramuscular BID     propranolol  20 mg Oral TID          Allergies:     Allergies   Allergen Reactions     Nkda [No Known Drug Allergies]           Labs:     Recent Results (from the past 48 hour(s))   Asymptomatic SARS-CoV-2 COVID-19 Virus (Coronavirus) by PCR    Collection Time: 02/04/21  2:35 PM    Specimen: Nasopharyngeal   Result Value Ref Range    SARS-CoV-2 Virus Specimen Source Nasopharyngeal     SARS-CoV-2 PCR Result NEGATIVE     SARS-CoV-2 PCR Comment (Note)           Psychiatric Examination:     /71   Pulse 89   Temp " "97.9  F (36.6  C) (Oral)   Resp 16   Wt 58.4 kg (128 lb 12.8 oz)   SpO2 100%   BMI 20.17 kg/m    Weight is 128 lbs 12.8 oz  Body mass index is 20.17 kg/m .    Orthostatic Vitals     None          Appearance: awake, alert, adequately groomed  Attitude: mostly uncooperative, extremely reactive and hostile  Eye Contact:  fair  Mood:  \"decent\"   Affect:  mood congruent, intensity is heightened, labile  Speech:  clear, coherent and normal prosody, raised volume, rapid  Language: fluent and intact in English  Psychomotor, Gait, Musculoskeletal:  no evidence of tardive dyskinesia, dystonia, or tics, restless  Thought Process:  tangential  Associations:  no loose associations  Thought Content:  no evidence of suicidal ideation or homicidal ideation and denies AVH, paranoia present  Insight:  limited  Judgement:  limited  Oriented to:  time, person, and place  Attention Span and Concentration:  fair  Recent and Remote Memory:  improving, still limited around events leading to hospitalization   Fund of Knowledge:  appropriate    Clinical Global Impressions  First:  Considering your total clinical experience with this particular patient population, how severe are the patient's symptoms at this time?: 6 (01/21/21 1437)  Compared to the patient's condition at the START of treatment, this patient's condition is: 3 (01/21/21 1437)  Most recent:  Considering your total clinical experience with this particular patient population, how severe are the patient's symptoms at this time?: 7 (01/29/21 1522)  Compared to the patient's condition at the START of treatment, this patient's condition is: 4 (01/29/21 1522)           Precautions:     Behavioral Orders   Procedures     Assault precautions     Code 1 - Restrict to Unit     Elopement precautions     Fall precautions     Routine Programming     As clinically indicated     Status 15     Every 15 minutes.          Diagnoses:     Bipolar I Disorder, current episode manic  Amphetamine " use disorder, severe  Cannabis use disorder  Akathisia   Foot pain/dry skin         Assessment & Plan:     Assessment and hospital summary:  This patient is a 25 year old woman with history of bipolar one disorder and polysubstance use (methamphetamine, alcohol, cocaine, heroin, cannabis) who presented to ED via EMS with agitation and aggression in context of methamphetamine and heroin use and likely medication nonadherence. Her symptoms of responding to internal stimuli, aggression, and disorganization are consistent with a manic episode with psychotic features with potential effects of amphetamine intoxication and opioid withdrawal. She has a history of multiple similar presentations in the ED recently but her symptoms have resolved more quickly, suggesting that her symptoms this time may be more due to her bipolar disorder than substance-induced. Alicia has a history of multiple similar presentations at prior hospitalizations. She has done well in these hospitalizations with an antipsychotic and sometimes a mood stabilizer. She has also required commitment and Carrillo for several of these hospitalizations. Therefore, based on her history and current presentation, she would likely benefit from a mood stabilizer and an antipsychotic to target her manic symptoms, agitation, and psychosis.      Alicia continued to have agitation after admission and declined Depakote. Given her imminent risk to herself and others and poor insight into her mental health, a petition for commitment and Carrillo were sent. As she was declining Depakote and requesting PTA Lamictal, Depakote was stopped and Lamictal initiated. Discussed need for adherence and that Lamictal is not effective in jackie prevention. Alicia began taking Zyprexa 5mg TID two days after admission. Gabapentin PRN was started to target opioid withdrawal as she had low-normal blood pressures and this would therefore be preferred over clonidine for symptoms management.  "     On 1/25, patient postured toward staff in an aggressive manner. She required seclusion and emergency neuroleptic medications. Psychiatric emergency was declared on 1/26, and Zyprexa was increased, and backed up with IM. Propranolol was also added to target anxiety and suspected akathisia. On 1/28 patient was threatening staff and wanting to leave room to \"attack\" staff, was not redirectable, required use of seclusion. She again required seclusion on 2/3 after becoming agitated and threatening to kill staff.    At this time, patient does not appear to be improving on current dose of Zyprexa. However, she is unwilling to consider any alternative neuroleptic medications or non-neuroleptic mood stabilizers. She becomes extremely agitated if we discuss medication options and abruptly terminates interview. Will continue to optimize Zyprexa with plan to transition to alternative neuroleptic medication once Carrillo is in place. Carrillo hearing is scheduled for 2/9.        Psychiatric treatment/inteventions:  Medications:   # Bipolar I Disorder:  -Resume Lamictal 50mg daily (initiated on 1/20 per pt request, and increased on 2/5). She understands that this is not first line treatment for jackie.   -Increase Zyprexa to 15 mg BID PO or IM.  Psychiatric emergency declared 1/26 as patient postured toward staff with the intent to assault them on 1/25. She may not decline medication.   -continue to discuss recommendation to start non-neuroleptic mood stabilizer outside of lamotrigine that would be first line for jackie, though patient has consistently declined due to concerns about weight gain.   -Continue propranolol 20 mg TID to target akathisia and anxiety  -continue hydroxyzine 25-50 mg q4h prn for acute anxiety  -continue melatonin PRN insomnia  -continue Zyprexa 10 mg q2h prn for severe agitation  -continue Haldol, Ativan, Benadryl PRN severe agitation/aggression     Laboratory/Imaging: Ordered repeat COVID per protocol " given pt has been hospitalized >1 week      Patient will be treated in therapeutic milieu with appropriate individual and group therapies as described.     Medical treatment/interventions:  Medical concerns:   # UTI, resolved:  -pt denying any further urinary symptoms   - completed treatment with Bactrim      # Migraines:  -continue PTA sumatriptan PRN     #Opioid Withdrawal, appears resolved:   -continue Gabapentin 300mg TID PRN opioid withdrawal symptoms  -continue PRN clonidine, Imodium, Zofran, Tylenol  -Discontinued COWS 1/22 given absence of significant withdrawal    #Dry Feet  -ordered PRN Eucerin cream, continue to monitor     Because of behavior leading to a seclusion or restraint episode on 1/25, we have made the following change to the treatment plan: psychiatric emergency was declared 1/26 and pts Zyprexa was increased and backed up with IM. Team petitioning for commitment and Carrillo. Pt had another episode of seclusion on 1/28, medication being adjusted to help decrease agitation, commitment and Carrillo continues to be pursued.     This note was created by undersigned using a Dragon dictation system. All typing errors or contextual distortion are unintentional and software inherent.     Disposition Plan   Reason for ongoing admission: poses an imminent risk to self, poses an imminent risk to others and is unable to care for self due to severe psychosis or jackie  Discharge location: TBD, may pursue CARE given pts poor insight/judgement and severity of symptoms  Discharge Medications: not ordered  Follow-up Appointments: not scheduled  Legal Status: court hold     Entered by: Alicia Reveles on 2/5/2021 at 4:49 PM       Video-Visit Details    Type of service:  Video Visit    Video Start Time (time video started): 1018    Video End Time (time video stopped): 1023    Originating Location (pt. Location): [address]    Distant Location (provider location): Provider remote location    Mode of  Communication:  Video Conference via [employer]    Physician has received verbal consent for a Video Visit from the patient? Yes

## 2021-02-05 NOTE — PLAN OF CARE
"  Problem: Behavior Regulation Impairment (Disruptive Behavior)  Goal: Improved Impulse and Aggression Control (Disruptive Behavior)  Outcome: No Change  Flowsheets (Taken 2/5/2021 1034)  Mutually Determined Action Steps (Improved Impulse and Aggression Control): verbalizes insight re: need for meds     Pt slept in and was in a calm mood when she woke up.  Pt took medications with no problem.  Flat, blunted affect, mood is labile. Pt was heard making verbally abusive statements to provider and other staff while on the tablet with provider. Pt was asked to if she wants to stop the visit. Pt shouted at staff \"Shut up! Get the f- out of here!\" Pt was physically trembling when she stood up from bed and asked RN for PRN. Later in the morning, RN rechecked on pt. Pt is frustrated over a waitlist and verbalized \"fearing the unknown.\" Pt spent time in her room watching tv.    Pt complained of pain and anxiety and requested zyprexa several times. RN reviewed pt's PRNs with pt and zyprexa is not indicated for that.  RN offered acetaminophen for pain and pt refused, stating \"I don't want that! I have anxiety.\" Pt agreed to take hydroxyzine PRN for anxiety.    Appetite = eating and tolerating meals    Sleep = pt reports poor, 5.75hrs    ADLs = independent; pt showered this shift    PRNs this shift  Lorazepam 1mg for agitation - pt reported it was helpful with decreasing agitation  Nicotine gum  Hydroxyzine 50mg for anxiety    Vital signs     02/05/21 0834   Vital Signs   Temp 97.9  F (36.6  C)   Temp src Oral   Resp 16   Pulse 89   Pulse Rate Source Monitor   BP 97/58   BP Location Left arm   Sitting Orthostatic BP   Sitting Orthostatic BP 97/58   Sitting Orthostatic Pulse 89 bpm     Plan  Reinforce boundaries and redirect pt when pt is agitated or threatening. Continue with plan of care.    Pt has a meeting with Gerardo Examiner at 4:00pm today.    Precautions: Fall, assault, elopement    "

## 2021-02-05 NOTE — PLAN OF CARE
Pt in bed sleeping at start of shift. Breathing quiet and unlabored. Appeared to be sleeping for 5.75 hours during the night.     Pt woke around 0335 and was given Benadryl 50mg and then at 0348 Ativan 1mg for agitation. Pt was demanding and insultive to RN and additionally requested Zyprexa. RN verbalized that she could get Zyprexa at a later time if the administered medication was not effective.        On FALL, ASSAULT, and ELOPEMENT precautions in addition to Single Room order, with no related events occurring this shift.     Will continue to monitor and assess.     Problem: Social, Occupational or Functional Impairment (Disruptive Behavior)  Goal: Enhanced Social, Occupational or Functional Skills (Disruptive Behavior)  Outcome: No Change

## 2021-02-05 NOTE — PROGRESS NOTES
Pt declined group if there wasn't going to be music. She was pacing in and out of group and added a few comments to group. She is grateful for her parents, she named them by first and last name and said they are  now. She said they are supportive of her. She also said she used to play softball. She was not irritable as in previous days.

## 2021-02-06 ENCOUNTER — TRANSFERRED RECORDS (OUTPATIENT)
Dept: HEALTH INFORMATION MANAGEMENT | Facility: CLINIC | Age: 26
End: 2021-02-06

## 2021-02-06 PROCEDURE — 250N000013 HC RX MED GY IP 250 OP 250 PS 637: Performed by: PSYCHIATRY & NEUROLOGY

## 2021-02-06 PROCEDURE — 124N000002 HC R&B MH UMMC

## 2021-02-06 PROCEDURE — 250N000013 HC RX MED GY IP 250 OP 250 PS 637: Performed by: STUDENT IN AN ORGANIZED HEALTH CARE EDUCATION/TRAINING PROGRAM

## 2021-02-06 RX ADMIN — LORAZEPAM 1 MG: 1 TABLET ORAL at 10:54

## 2021-02-06 RX ADMIN — MELATONIN TAB 3 MG 3 MG: 3 TAB at 21:34

## 2021-02-06 RX ADMIN — OLANZAPINE 15 MG: 10 TABLET, ORALLY DISINTEGRATING ORAL at 08:40

## 2021-02-06 RX ADMIN — LAMOTRIGINE 50 MG: 25 TABLET ORAL at 08:40

## 2021-02-06 RX ADMIN — NICOTINE POLACRILEX 4 MG: 2 GUM, CHEWING ORAL at 14:05

## 2021-02-06 RX ADMIN — NICOTINE POLACRILEX 4 MG: 2 GUM, CHEWING ORAL at 20:00

## 2021-02-06 RX ADMIN — NICOTINE POLACRILEX 4 MG: 2 GUM, CHEWING ORAL at 08:40

## 2021-02-06 RX ADMIN — LEVONORGESTREL AND ETHINYL ESTRADIOL 1 TABLET: KIT at 08:42

## 2021-02-06 RX ADMIN — FLUTICASONE FUROATE 1 PUFF: 100 POWDER RESPIRATORY (INHALATION) at 08:42

## 2021-02-06 RX ADMIN — NICOTINE POLACRILEX 4 MG: 2 GUM, CHEWING ORAL at 11:05

## 2021-02-06 RX ADMIN — ALUMINUM HYDROXIDE, MAGNESIUM HYDROXIDE, AND SIMETHICONE 30 ML: 200; 200; 20 SUSPENSION ORAL at 15:51

## 2021-02-06 RX ADMIN — HYDROXYZINE HYDROCHLORIDE 25 MG: 25 TABLET, FILM COATED ORAL at 15:04

## 2021-02-06 RX ADMIN — PROPRANOLOL HYDROCHLORIDE 20 MG: 20 TABLET ORAL at 18:39

## 2021-02-06 RX ADMIN — PROPRANOLOL HYDROCHLORIDE 20 MG: 20 TABLET ORAL at 14:05

## 2021-02-06 RX ADMIN — NICOTINE POLACRILEX 2 MG: 2 GUM, CHEWING ORAL at 21:34

## 2021-02-06 RX ADMIN — GABAPENTIN 300 MG: 300 CAPSULE ORAL at 15:53

## 2021-02-06 RX ADMIN — PROPRANOLOL HYDROCHLORIDE 20 MG: 20 TABLET ORAL at 08:40

## 2021-02-06 RX ADMIN — NICOTINE POLACRILEX 4 MG: 2 GUM, CHEWING ORAL at 18:43

## 2021-02-06 RX ADMIN — DIPHENHYDRAMINE HYDROCHLORIDE 50 MG: 50 CAPSULE ORAL at 20:00

## 2021-02-06 RX ADMIN — OLANZAPINE 15 MG: 10 TABLET, ORALLY DISINTEGRATING ORAL at 18:40

## 2021-02-06 RX ADMIN — NICOTINE POLACRILEX 4 MG: 2 GUM, CHEWING ORAL at 15:51

## 2021-02-06 ASSESSMENT — ACTIVITIES OF DAILY LIVING (ADL)
DRESS: SCRUBS (BEHAVIORAL HEALTH);INDEPENDENT
LAUNDRY: UNABLE TO COMPLETE
LAUNDRY: WITH SUPERVISION
ORAL_HYGIENE: INDEPENDENT
ORAL_HYGIENE: INDEPENDENT
HYGIENE/GROOMING: SHOWER;INDEPENDENT
HYGIENE/GROOMING: HANDWASHING
DRESS: SCRUBS (BEHAVIORAL HEALTH)

## 2021-02-06 NOTE — PLAN OF CARE
"Pt presents as labile, hyperactive, and verbally aggressive this evening. She is demonstrating very poor boundaries with a male peer on pod 1, talking about drugs, sex, and physically touching each other (attempting to hug, touching shoulders and arms etc). Alicia referred to this peer as \"my bestie.\" Male peer stated to Alicia \"I'll be the dom, and you can be the submissive.\" Alicia and male peer were placed in separate pods.  Affect and mood labile. She is hyperactive and restless; pacing in the hallway, only able to sit down for a couple minutes. Pt is often yelling or raising her voice, screaming obscenities at staff when her requests are not immediately granted; \"I asked you for a fucking washcloth, you're so slow!\" She requested prn Zyprexa for agitation at 1648. She again was agitated later in evening and screaming at staff to open the bathroom door; she yelled that \"I'll shit my pants and you'll see!\" She then appeared to have liquid all over her pants, and reported that she was incontinent. She was given new scrubs and requested to shower.  She was compliant with scheduled medications. Denies any side effects.   "

## 2021-02-06 NOTE — PLAN OF CARE
Problem: Behavioral Health Plan of Care  Goal: Plan of Care Review  Outcome: No Change   Pt slept through the night. No behavioral concerns noted, No PRN administered. Pt slept for 7 hours.

## 2021-02-06 NOTE — PLAN OF CARE
Problem: Behavioral Health Plan of Care  Goal: Plan of Care Review  Outcome: No Change   RN Assessment  Patient has been irritable and dismissive on approach. Restless and hyperactive when in the milieu. Rude and demanding when making requests or interacting with others. Endorsed anxiety and was given lorazepam which was effective. Ate all meals. Continues to throw food and garbage on the floor in her room. Denies thoughts death/dying or not being alive. Denies thoughts of suicide or non -suicidal self injury.   Remains compliant with medications. Denies side effects. No physical health complaints this shift.  /76, Pulse 91, Temp 97.8  F, Resp 16, SpO2 96%   Care plan was reviewed and updated today. Patient care was dicussed in team. Will continue with current plan and recommendations. Continue to monitor and reassess symptoms. Continue with current level of observation and precautions in place. See  notes for discharge planning.

## 2021-02-07 PROCEDURE — 250N000013 HC RX MED GY IP 250 OP 250 PS 637: Performed by: PSYCHIATRY & NEUROLOGY

## 2021-02-07 PROCEDURE — 250N000013 HC RX MED GY IP 250 OP 250 PS 637: Performed by: STUDENT IN AN ORGANIZED HEALTH CARE EDUCATION/TRAINING PROGRAM

## 2021-02-07 PROCEDURE — 124N000002 HC R&B MH UMMC

## 2021-02-07 RX ADMIN — ALUMINUM HYDROXIDE, MAGNESIUM HYDROXIDE, AND SIMETHICONE 30 ML: 200; 200; 20 SUSPENSION ORAL at 16:09

## 2021-02-07 RX ADMIN — NICOTINE POLACRILEX 4 MG: 2 GUM, CHEWING ORAL at 11:05

## 2021-02-07 RX ADMIN — LAMOTRIGINE 50 MG: 25 TABLET ORAL at 08:48

## 2021-02-07 RX ADMIN — OLANZAPINE 15 MG: 10 TABLET, ORALLY DISINTEGRATING ORAL at 19:55

## 2021-02-07 RX ADMIN — OLANZAPINE 15 MG: 10 TABLET, ORALLY DISINTEGRATING ORAL at 08:48

## 2021-02-07 RX ADMIN — GABAPENTIN 300 MG: 300 CAPSULE ORAL at 18:11

## 2021-02-07 RX ADMIN — FLUTICASONE FUROATE 1 PUFF: 100 POWDER RESPIRATORY (INHALATION) at 08:53

## 2021-02-07 RX ADMIN — LEVONORGESTREL AND ETHINYL ESTRADIOL 1 TABLET: KIT at 08:49

## 2021-02-07 RX ADMIN — NICOTINE POLACRILEX 4 MG: 2 GUM, CHEWING ORAL at 16:09

## 2021-02-07 RX ADMIN — LORAZEPAM 1 MG: 1 TABLET ORAL at 01:02

## 2021-02-07 RX ADMIN — DIPHENHYDRAMINE HYDROCHLORIDE 50 MG: 50 CAPSULE ORAL at 01:02

## 2021-02-07 RX ADMIN — PROPRANOLOL HYDROCHLORIDE 20 MG: 20 TABLET ORAL at 15:10

## 2021-02-07 RX ADMIN — HYDROXYZINE HYDROCHLORIDE 50 MG: 25 TABLET, FILM COATED ORAL at 16:09

## 2021-02-07 RX ADMIN — NICOTINE POLACRILEX 4 MG: 2 GUM, CHEWING ORAL at 08:48

## 2021-02-07 RX ADMIN — LORAZEPAM 1 MG: 1 TABLET ORAL at 11:04

## 2021-02-07 RX ADMIN — PROPRANOLOL HYDROCHLORIDE 20 MG: 20 TABLET ORAL at 08:48

## 2021-02-07 RX ADMIN — NICOTINE POLACRILEX 4 MG: 2 GUM, CHEWING ORAL at 19:55

## 2021-02-07 RX ADMIN — NICOTINE POLACRILEX 4 MG: 2 GUM, CHEWING ORAL at 18:11

## 2021-02-07 RX ADMIN — NICOTINE POLACRILEX 4 MG: 2 GUM, CHEWING ORAL at 15:10

## 2021-02-07 RX ADMIN — MELATONIN TAB 3 MG 3 MG: 3 TAB at 19:55

## 2021-02-07 RX ADMIN — PROPRANOLOL HYDROCHLORIDE 20 MG: 20 TABLET ORAL at 19:55

## 2021-02-07 ASSESSMENT — ACTIVITIES OF DAILY LIVING (ADL)
ORAL_HYGIENE: INDEPENDENT
HYGIENE/GROOMING: HANDWASHING;INDEPENDENT
HYGIENE/GROOMING: INDEPENDENT
LAUNDRY: WITH SUPERVISION
LAUNDRY: WITH SUPERVISION
DRESS: SCRUBS (BEHAVIORAL HEALTH);INDEPENDENT
DRESS: SCRUBS (BEHAVIORAL HEALTH)
ORAL_HYGIENE: INDEPENDENT

## 2021-02-07 NOTE — PLAN OF CARE
Pt continues to be restless and labile. Irritable and agitated at times, typically when requests not granted immediately. Poor frustration tolerance. Pt is now on top of the hour requests due to excessive requests, often when staff are helping peers. This order was explained to Alicia, and she demonstrated understanding. She has been pacing in the hallways and listening to headphones for most of the evening. Demonstrates poor boundaries with peers. Pt frequently requesting medications for anxiety, agitation, and restlessness. She was compliant with all scheduled medications this evening. Declined group this afternoon.

## 2021-02-07 NOTE — PLAN OF CARE
"  Problem: Behavioral Health Plan of Care  Goal: Plan of Care Review  Outcome: No Change   RN Assessment   Minimal changes since previous assessment. Had periods of agitation after she was asked to  soiled in food and milk linens from her floor. She was upset for a few minutes and loudly expressed frustration stating that she could still reuse the linens. Floor was covered with spilled liquids and food. Requested lorazepam for anxiety and agitation and notably improved following administration. She seems to be tolerant in response to behavior of other patient's.   Today sated that she knew that someone that looks like her, \" only not as pretty\" and has exactly the same name \" only spelled a little different \" and who lived in the [place] was mistaken for her and was damaging her reputation.   Remained restless in the milieu. Affect full range and reactive. Mood labile. Denies thoughts death/dying or not being alive. Denies thoughts of suicide or non -suicidal self injury.    Remains medication compliant and denies side effects. No physical complains today.   Care plan was reviewed and updated today. Patient care was dicussed in team. Will continue with current plan and recommendations. Continue to monitor and reassess symptoms. Continue with current level of observation and precautions in place. See  notes for discharge planning.    "

## 2021-02-07 NOTE — PLAN OF CARE
"Patient continues on top of the hour requests. Patient is visible on the unit, social in milieu. Patient presents with blunted affect, anxious mood. Restless activity. Patient reports anxiety and depression 4/10. Patient denies suicidal ideation and SIB, denies homicidal ideations and hallucinations. No outbursts this evening.     Patient requested medication for gas and reported anxiety, given hydroxyzine and maalox at 1609. Patient given gabapentin at 1811 (requested ativan, ...\"or gabapentin,\") for reports of anxiety and pain due to cramps. Will continue to monitor for safety through this shift.       "

## 2021-02-07 NOTE — PLAN OF CARE
Pt awake at start of shift.     Pt woke at 0100 and requested and was given a snack. Pt was asked to return to her room after obtaining her snack and began to scream. Pt was given Benadryl 50mg and Ativan 1mg. Pt continued to scream statements re; how much she hates it here in her room until she fell asleep.     Appeared to be sleeping for 5.5 hours during the night. Breathing quiet and unlabored.     Pt had several requests during the HS which were acknowledged on the top of the hour per order.     Pt on FALL, ASSAULT, and ELOPEMENT precautions in addition to single room order. Any related events noted above.     Will continue to monitor and assess.     Problem: Sleep Disturbance (Disruptive Behavior)  Goal: Improved Sleep (Disruptive Behavior)  Outcome: No Change

## 2021-02-08 PROCEDURE — 99233 SBSQ HOSP IP/OBS HIGH 50: CPT | Mod: 95 | Performed by: PSYCHIATRY & NEUROLOGY

## 2021-02-08 PROCEDURE — 250N000013 HC RX MED GY IP 250 OP 250 PS 637: Performed by: PSYCHIATRY & NEUROLOGY

## 2021-02-08 PROCEDURE — 250N000013 HC RX MED GY IP 250 OP 250 PS 637: Performed by: STUDENT IN AN ORGANIZED HEALTH CARE EDUCATION/TRAINING PROGRAM

## 2021-02-08 PROCEDURE — 124N000002 HC R&B MH UMMC

## 2021-02-08 PROCEDURE — G0177 OPPS/PHP; TRAIN & EDUC SERV: HCPCS

## 2021-02-08 RX ADMIN — NICOTINE POLACRILEX 4 MG: 2 GUM, CHEWING ORAL at 19:10

## 2021-02-08 RX ADMIN — PROPRANOLOL HYDROCHLORIDE 20 MG: 20 TABLET ORAL at 08:19

## 2021-02-08 RX ADMIN — NICOTINE POLACRILEX 4 MG: 2 GUM, CHEWING ORAL at 08:19

## 2021-02-08 RX ADMIN — NICOTINE POLACRILEX 4 MG: 2 GUM, CHEWING ORAL at 10:47

## 2021-02-08 RX ADMIN — NICOTINE POLACRILEX 4 MG: 2 GUM, CHEWING ORAL at 16:36

## 2021-02-08 RX ADMIN — FLUTICASONE FUROATE 1 PUFF: 100 POWDER RESPIRATORY (INHALATION) at 08:20

## 2021-02-08 RX ADMIN — HYDROXYZINE HYDROCHLORIDE 50 MG: 25 TABLET, FILM COATED ORAL at 18:16

## 2021-02-08 RX ADMIN — OLANZAPINE 15 MG: 10 TABLET, ORALLY DISINTEGRATING ORAL at 19:08

## 2021-02-08 RX ADMIN — LAMOTRIGINE 50 MG: 25 TABLET ORAL at 08:19

## 2021-02-08 RX ADMIN — PROPRANOLOL HYDROCHLORIDE 20 MG: 20 TABLET ORAL at 19:08

## 2021-02-08 RX ADMIN — NICOTINE POLACRILEX 4 MG: 2 GUM, CHEWING ORAL at 14:43

## 2021-02-08 RX ADMIN — GABAPENTIN 300 MG: 300 CAPSULE ORAL at 12:20

## 2021-02-08 RX ADMIN — LEVONORGESTREL AND ETHINYL ESTRADIOL 1 TABLET: KIT at 08:20

## 2021-02-08 RX ADMIN — MELATONIN TAB 3 MG 3 MG: 3 TAB at 19:10

## 2021-02-08 RX ADMIN — NICOTINE POLACRILEX 4 MG: 2 GUM, CHEWING ORAL at 12:18

## 2021-02-08 RX ADMIN — OLANZAPINE 15 MG: 10 TABLET, ORALLY DISINTEGRATING ORAL at 08:19

## 2021-02-08 RX ADMIN — NICOTINE POLACRILEX 4 MG: 2 GUM, CHEWING ORAL at 18:16

## 2021-02-08 RX ADMIN — HYDROXYZINE HYDROCHLORIDE 50 MG: 25 TABLET, FILM COATED ORAL at 10:47

## 2021-02-08 ASSESSMENT — ACTIVITIES OF DAILY LIVING (ADL)
LAUNDRY: UNABLE TO COMPLETE
HYGIENE/GROOMING: INDEPENDENT
ORAL_HYGIENE: INDEPENDENT
DRESS: SCRUBS (BEHAVIORAL HEALTH)

## 2021-02-08 NOTE — PLAN OF CARE
Problem: OT General Care Plan  Goal: OT Goal 1  Description: Due to limited understanding regarding reasons for current admission, pt will attend OT group to gain self awareness and insight into reasons for hospitalization, and will begin to identify areas where unstable mental health has affected life management skills.    Pt attended the morning OT check-in and activity group.  Pt was too distractable for the discussion portion of group, and was constantly in and out per usual.  When she saw projects being taken out of the cabinet, pt returned to group, and asked to finish painting her box, worked on this for the remainder of group.  Was set up at her own table off to the side of the lounge, almost in the hallway, which seemed to help her concentrate, and was less distracting for peers as well.  Outcome: No Change

## 2021-02-08 NOTE — PLAN OF CARE
BEHAVIORAL TEAM DISCUSSION    Participants:  Ming Palencia, RN, Jane, RN, Michi, CTC  Progress: Continuing to assess  Anticipated length of stay: Unknown  Continued Stay Criteria/Rationale:  Patient is in Civil Commitment process with Carrillo and on the wait list for [facility]ARE Program.   Medical/Physical:  Per Internal Medicine  Precautions:   Behavioral Orders   Procedures    Assault precautions    Code 1 - Restrict to Unit    Elopement precautions    Fall precautions    Routine Programming     As clinically indicated    Status 15     Every 15 minutes.     Plan: Patient has her 2nd Carrillo Hearing 2/10/21 @ 1:30pm through [facility].   Rationale for change in precautions or plan: No change

## 2021-02-08 NOTE — PLAN OF CARE
Work Completed:  Attended Team Meeting: Reviewed chart notes:     Discharge plan or goal:  CARE Program                Barriers to discharge: Patient continues to have manic symptoms and poor insight. Acuity of symptoms.

## 2021-02-08 NOTE — PLAN OF CARE
Nursing assessment completed. Patient hyperactive throughout shift and makes several requests for PRN medications for anxiety, although she appears almost elated in the lounge at times. Dancing and laughing with peers. Patient afternoon Proanolol Held for /59. No s/s. Patient denies SI/SIB. Med compliant. Continue to monitor and assess.

## 2021-02-08 NOTE — PLAN OF CARE
Problem: Sleep Disturbance (Disruptive Behavior)  Goal: Improved Sleep (Disruptive Behavior)  Outcome: Improving   Pt slept through the night with bathroom breaks. No behavioral concerns noted. No PRN administered. Pt slept for 5.75 hours.

## 2021-02-08 NOTE — PROGRESS NOTES
"[facility], [place]   Psychiatric Progress Note  Hospital Day: 21        Interim History:   The patient's care was discussed with the treatment team during the daily team meeting and/or staff's chart notes were reviewed.  Staff report patient remains irritable, labile, restless with minimal insight into her mental health. She remains unchanged with ongoing symptoms of psychosis and jackie. She is demonstrating very poor boundaries with a male peer on pod 1, talking about drugs, sex, and physically touching each other (attempting to hug, touching shoulders and arms etc). Alicia referred to this peer as \"my bestie.\" Male peer stated to Alicia \"I'll be the dom, and you can be the submissive.\" Alicia and male peer were placed in separate pods. Affect and mood labile. She is hyperactive and restless; pacing in the hallway, only able to sit down for a couple minutes. Pt is often yelling or raising her voice, screaming obscenities at staff when her requests are not immediately granted; \"I asked you for a fucking washcloth, you're so slow!\"  She has a very poor frustration tolerance.  Denies SI/HI. Remains medication adherent.  Attending and participating in some groups.    Upon interview, patient was requesting to speak with me and sticking her tongue out at me prior to meeting with me privately in her room.  Alicia states that she is feeling \"decent.\"  She requests that we \"change gears into an IRTS,\" rather than pursuing a CARE program due to concerns about long wait list.  She said that she feels that she is able to maintain sobriety if she goes to an IRTS.  She said that she went to 1 in the past and \"I was a good girl.\"  Patient expresses desire to maintain sobriety in the future, however, is unable to identify any coping strategies/supports that would help her to do so.  Patient reports feeling depressed due to extended hospital stay.  When asked what she has experienced depression " "outside of the hospital setting, she replied \"no, never.\"  Patient denies SI.  When asked about HI, the patient said \"I was having a little homicidal thought toward anyone who was testing my patients before I got here.\"  She denied experiencing homicidal thoughts directed toward anyone in particular.  She denies auditory and visual hallucinations.  When asked about paranoia, she said that she was concerned that individuals were \"trying to put me in skilled nursing, but not kill me.  They were trying to get revenge.\"  When asked who was attempting to get revenge, the patient replied \"the people on the streets because I kicked them out of my car or something.\"  Patient exhibited ongoing significant delusional thought content as she stated that her father was secretly at the \"sperm bank, and that is why so many people walk and talk and look like me.\"  Sometimes she sees people in the hospital who look like her.  She refers to her father as a \"free agent.  The movie 007 was basically based on our lives.\"  She also mentioned that her mother is \"watching over all the sex trafficking in [place].\"  She then made a comment about the concern that we are \"in the illuminati.\"  Her lunch then arrived and she subsequently terminated the interview.         Medications:       - Psychiatric Emergency -   Does not apply See Admin Instructions     fluticasone  1 puff Inhalation Daily     lamoTRIgine  50 mg Oral Daily     levonorgestrel-ethinyl estradiol  1 tablet Oral Daily     OLANZapine zydis  15 mg Oral BID    Or     OLANZapine  10 mg Intramuscular BID     propranolol  20 mg Oral TID          Allergies:     Allergies   Allergen Reactions     Nkda [No Known Drug Allergies]           Labs:     No results found for this or any previous visit (from the past 48 hour(s)).       Psychiatric Examination:     /57 (BP Location: Left arm)   Pulse 90   Temp 98.8  F (37.1  C) (Tympanic)   Resp 16   Wt 58.4 kg (128 lb 12.8 oz)   SpO2 94%   " "BMI 20.17 kg/m    Weight is 128 lbs 12.8 oz  Body mass index is 20.17 kg/m .    Orthostatic Vitals     None          Appearance: awake, alert, adequately groomed.  Sticking her tongue out at this writer in a playful manner.  Attitude: More cooperative today, less dysregulated though still quite irritable at times  Eye Contact:  fair  Mood:  \"decent\"   Affect:  mood congruent, intensity is heightened, labile  Speech:  clear, coherent and normal prosody, rapid  Language: fluent and intact in English  Psychomotor, Gait, Musculoskeletal:  no evidence of tardive dyskinesia, dystonia, or tics, restless  Thought Process:  tangential, disorganized  Associations:  no loose associations  Thought Content:  no evidence of suicidal ideation or homicidal ideation and denies AVH, paranoia present and delusional thought content present  Insight:  limited  Judgement:  limited  Oriented to:  time, person, and place  Attention Span and Concentration:  fair  Recent and Remote Memory:  improving, still limited around events leading to hospitalization   Fund of Knowledge:  appropriate    Clinical Global Impressions  First:  Considering your total clinical experience with this particular patient population, how severe are the patient's symptoms at this time?: 6 (01/21/21 1437)  Compared to the patient's condition at the START of treatment, this patient's condition is: 3 (01/21/21 1437)  Most recent:  Considering your total clinical experience with this particular patient population, how severe are the patient's symptoms at this time?: 7 (01/29/21 1522)  Compared to the patient's condition at the START of treatment, this patient's condition is: 4 (01/29/21 1522)           Precautions:     Behavioral Orders   Procedures     Assault precautions     Code 1 - Restrict to Unit     Elopement precautions     Fall precautions     Routine Programming     As clinically indicated     Status 15     Every 15 minutes.          Diagnoses:     Bipolar I " Disorder, current episode manic  Amphetamine use disorder, severe  Cannabis use disorder  Akathisia   Foot pain/dry skin         Assessment & Plan:     Assessment and hospital summary:  This patient is a 25 year old woman with history of bipolar one disorder and polysubstance use (methamphetamine, alcohol, cocaine, heroin, cannabis) who presented to ED via EMS with agitation and aggression in context of methamphetamine and heroin use and likely medication nonadherence. Her symptoms of responding to internal stimuli, aggression, and disorganization are consistent with a manic episode with psychotic features with potential effects of amphetamine intoxication and opioid withdrawal. She has a history of multiple similar presentations in the ED recently but her symptoms have resolved more quickly, suggesting that her symptoms this time may be more due to her bipolar disorder than substance-induced. Alicia has a history of multiple similar presentations at prior hospitalizations. She has done well in these hospitalizations with an antipsychotic and sometimes a mood stabilizer. She has also required commitment and Carrillo for several of these hospitalizations. Therefore, based on her history and current presentation, she would likely benefit from a mood stabilizer and an antipsychotic to target her manic symptoms, agitation, and psychosis.      Alicia continued to have agitation after admission and declined Depakote. Given her imminent risk to herself and others and poor insight into her mental health, a petition for commitment and Carrillo were sent. As she was declining Depakote and requesting PTA Lamictal, Depakote was stopped and Lamictal initiated. Discussed need for adherence and that Lamictal is not effective in jackie prevention. Alicia began taking Zyprexa 5mg TID two days after admission. Gabapentin PRN was started to target opioid withdrawal as she had low-normal blood pressures and this would therefore be  "preferred over clonidine for symptoms management.      On 1/25, patient postured toward staff in an aggressive manner. She required seclusion and emergency neuroleptic medications. Psychiatric emergency was declared on 1/26, and Zyprexa was increased, and backed up with IM. Propranolol was also added to target anxiety and suspected akathisia. On 1/28 patient was threatening staff and wanting to leave room to \"attack\" staff, was not redirectable, required use of seclusion. She again required seclusion on 2/3 after becoming agitated and threatening to kill staff.    At this time, patient does not appear to be improving on current dose of Zyprexa. However, she is unwilling to consider any alternative neuroleptic medications or non-neuroleptic mood stabilizers. She becomes extremely agitated if we discuss medication options and abruptly terminates interview. Will continue to optimize Zyprexa with plan to transition to alternative neuroleptic medication once Carrillo is in place. Carrillo hearing is scheduled for 2/10.        Psychiatric treatment/inteventions:  Medications:   # Bipolar I Disorder:  -Resume Lamictal 50mg daily (initiated on 1/20 per pt request, and increased on 2/5). She understands that this is not first line treatment for jackie.   -Resume Zyprexa 15 mg BID PO or IM (most recently increased on 2/5).  Psychiatric emergency declared 1/26 as patient postured toward staff with the intent to assault them on 1/25. She may not decline medication.   -continue to discuss recommendation to start non-neuroleptic mood stabilizer outside of lamotrigine that would be first line for jackie, though patient has consistently declined due to concerns about weight gain.   -Continue propranolol 20 mg TID to target akathisia and anxiety  -continue hydroxyzine 25-50 mg q4h prn for acute anxiety  -continue melatonin PRN insomnia  -continue Zyprexa 10 mg q2h prn for severe agitation  -continue Haldol, Ativan, Benadryl PRN severe " agitation/aggression     Laboratory/Imaging: Ordered repeat COVID per protocol given pt has been hospitalized >1 week      Patient will be treated in therapeutic milieu with appropriate individual and group therapies as described.     Medical treatment/interventions:  Medical concerns:   # UTI, resolved:  -pt denying any further urinary symptoms   - completed treatment with Bactrim      # Migraines:  -continue PTA sumatriptan PRN     #Opioid Withdrawal, appears resolved:   -continue Gabapentin 300mg TID PRN opioid withdrawal symptoms  -continue PRN clonidine, Imodium, Zofran, Tylenol  -Discontinued COWS 1/22 given absence of significant withdrawal    #Dry Feet  -ordered PRN Eucerin cream, continue to monitor     Because of behavior leading to a seclusion or restraint episode on 1/25, we have made the following change to the treatment plan: psychiatric emergency was declared 1/26 and pts Zyprexa was increased and backed up with IM. Team petitioning for commitment and Carrillo. Pt had another episode of seclusion on 1/28, medication being adjusted to help decrease agitation, commitment and Carrillo continues to be pursued.     This note was created by undersigned using a Dragon dictation system. All typing errors or contextual distortion are unintentional and software inherent.     Disposition Plan   Reason for ongoing admission: poses an imminent risk to self, poses an imminent risk to others and is unable to care for self due to severe psychosis or jackie  Discharge location: TBD, may pursue CARE given pts poor insight/judgement and severity of symptoms  Discharge Medications: not ordered  Follow-up Appointments: not scheduled  Legal Status: court hold     Entered by: Alicia Reveles on 2/8/2021 at 1:56 PM       Video-Visit Details    Type of service:  Video Visit    Video Start Time (time video started): 1140    Video End Time (time video stopped): 1153    Originating Location (pt. Location): [facility]  [address]    Distant Location (provider location): Provider remote location    Mode of Communication:  Video Conference via [employer]    Physician has received verbal consent for a Video Visit from the patient? Yes

## 2021-02-08 NOTE — PROGRESS NOTES
" 02/04/21 2200   Groups   Details    (Psychotherapy group)   Number of patients attending the group:  2  Group Length:  1     Group Therapy Type: Psychotherapy     Summary of Group / Topics Discussed:  Strengths and DBt emotional regulation and coping        The  Psychotherapy group goal is to promote insight to positive choice and change. Group processing is within a supportive and safe environment. Patients will process emotions using verbal group and expressive psychotherapy interventions including visual art/writing interventions.     Group interventions support patients by: cultivating resilience, fostering self awareness, learn positive coping mechanisms and hope/optimism     Modalities to reach these goals include: positive/solution focused psychology        Group Response- small group of 2     Patient Response-Pt was in a positive mood today. She worked on her project rather quickly. She identified strengths as : kind, brave and intelligent. She was in and out of group, pacing, but returning to conversation and toward the end of group she and other group member got to chooses one song which she enjoyed. She didn't have the focus to absorb the DBT skills that were discussed.    Mood report- \"restless\"        Joel Locke, DANIELFT, ATR-BC       "

## 2021-02-09 PROCEDURE — 250N000013 HC RX MED GY IP 250 OP 250 PS 637: Performed by: PSYCHIATRY & NEUROLOGY

## 2021-02-09 PROCEDURE — H2032 ACTIVITY THERAPY, PER 15 MIN: HCPCS

## 2021-02-09 PROCEDURE — 99233 SBSQ HOSP IP/OBS HIGH 50: CPT | Mod: 95 | Performed by: PSYCHIATRY & NEUROLOGY

## 2021-02-09 PROCEDURE — G0177 OPPS/PHP; TRAIN & EDUC SERV: HCPCS

## 2021-02-09 PROCEDURE — 124N000002 HC R&B MH UMMC

## 2021-02-09 RX ADMIN — OLANZAPINE 5 MG: 5 TABLET, FILM COATED ORAL at 23:51

## 2021-02-09 RX ADMIN — PROPRANOLOL HYDROCHLORIDE 20 MG: 20 TABLET ORAL at 19:00

## 2021-02-09 RX ADMIN — NICOTINE POLACRILEX 4 MG: 2 GUM, CHEWING ORAL at 18:25

## 2021-02-09 RX ADMIN — PROPRANOLOL HYDROCHLORIDE 20 MG: 20 TABLET ORAL at 14:11

## 2021-02-09 RX ADMIN — NICOTINE POLACRILEX 4 MG: 2 GUM, CHEWING ORAL at 14:11

## 2021-02-09 RX ADMIN — NICOTINE POLACRILEX 4 MG: 2 GUM, CHEWING ORAL at 10:21

## 2021-02-09 RX ADMIN — NICOTINE POLACRILEX 4 MG: 2 GUM, CHEWING ORAL at 12:32

## 2021-02-09 RX ADMIN — NICOTINE POLACRILEX 4 MG: 2 GUM, CHEWING ORAL at 16:21

## 2021-02-09 RX ADMIN — OLANZAPINE 15 MG: 10 TABLET, ORALLY DISINTEGRATING ORAL at 21:54

## 2021-02-09 RX ADMIN — PROPRANOLOL HYDROCHLORIDE 20 MG: 20 TABLET ORAL at 10:12

## 2021-02-09 RX ADMIN — OLANZAPINE 15 MG: 10 TABLET, ORALLY DISINTEGRATING ORAL at 10:12

## 2021-02-09 RX ADMIN — LAMOTRIGINE 50 MG: 25 TABLET ORAL at 10:12

## 2021-02-09 RX ADMIN — FLUTICASONE FUROATE 1 PUFF: 100 POWDER RESPIRATORY (INHALATION) at 10:12

## 2021-02-09 ASSESSMENT — ACTIVITIES OF DAILY LIVING (ADL)
ORAL_HYGIENE: INDEPENDENT
HYGIENE/GROOMING: HANDWASHING;SHOWER;INDEPENDENT
HYGIENE/GROOMING: INDEPENDENT
DRESS: SCRUBS (BEHAVIORAL HEALTH)
LAUNDRY: UNABLE TO COMPLETE
ORAL_HYGIENE: INDEPENDENT

## 2021-02-09 NOTE — PLAN OF CARE
Work Completed: Attended Team Meeting: Reviewed chart notes: Spoke with [facility] Pre-Petition Screener.      Discharge plan or goal:  CARE Program. Patient has been referred to multiple CARE sites.                Barriers to discharge:  Acuity of symptoms. Patient is in the Civil Commitment Process through [facility]. Court Hearing tomorrow.

## 2021-02-09 NOTE — PLAN OF CARE
"Alicia has been up and awake since about 8am. Out of her room most of the shift, only slept minimally in the afternoon.  She spent a good portion of the day pacing the hallway with music on.   Her affect was generally bright, was frequently singing songs. She only had one verbal outburst this shift. This occurred in the morning when she was talking with somebody from the court. The call started out cordial, but very quickly she started cussing with whomever she was speaking with, and slamming the phone headset down repeatedly against the phone while continuing to cuss out the other caller. She stormed away from the phone call extremely agitated, but calmed quickly alone in her room.     Alicia endorses feeling well, and being in a good mood. She had minimally eye contact with speaking with her, and was restless even when in conversation and constantly in motion either pacing or moving about her room. She expressed frustration with not having a more concrete plan about when she is to officially leave. She stateded her meds are working \"fine\" and denied any side effects. She stated she is sleeping well. Denies SI/SIB.     In the afternoon she endorsed feeling more restless, more unsettled, but did not know the source.   "

## 2021-02-09 NOTE — PLAN OF CARE
"Nursing assessment completed. Patient awake and visible throughout the shift. Patient presents as calm and cooperative, affect full range, and thought process distractible. Pt is unable to stay in one place very long, and often goes from activity to activity. Difficulty completing a conversation topic without becoming distracted to another topic or something else on the unit. Pt endorses anxiety. Denies SI/SIB or thoughts to harm others. Medication compliant. Continue to monitor and assess.     At 2225 pt approached writer calmly and requested a gabapentin for \"leg pain\" she states walking is hurting her legs. Writer explained to pt that her gabapentin was not indicated for these reasons. She began to scream at writer \"well then it's for anxiety!\". Writer attempted to discuss options to help with anxiety, however patient began to shout \"you are a stupid fucking cunt!\" to writer. She went in her room and began to pound on the walls. She then approached writer again demanding gabapentin. Writer offered her tylenol for leg pain. Patient began to mock writer, screaming and yelling, She was directed to her room were she yelled she would \"kill all the staff\". Code green paged. While code team arrived to the unit, patient was able to demonstrate behavioral control while in her room. No PRNs administered at this time.   "

## 2021-02-09 NOTE — PROGRESS NOTES
"[facility], [place]   Psychiatric Progress Note  Hospital Day: 22        Interim History:   The patient's care was discussed with the treatment team during the daily team meeting and/or staff's chart notes were reviewed.  Staff report patient remains irritable, labile, restless with minimal insight into her mental health. She was calm for extended periods of time during day shift yesterday. She requested Gabapentin for leg pain. This was not an indication for Gabapentin and thus her request was denied. She called RN \"a stupid fucking cunt.\" She threatened to \"kill all the staff.\" Code green was paged, and patient was asked to go to her room. She was not overtly aggressive or violent toward others, and thus did not require seclusion or restraints. Slept 5.25 hours overnight.     Upon interview, patient was initially relatively calm. She said that her mood is \"alright.\" Said that her sleep was \"alright.\" She reported HI prior to her admission, and said \"it was probably from the cut dope going around.\" She denied SI. She then abruptly became agitated and accused this writer of prescribing specific medications because \"You have stock invested! I know you do! Don't deny it you stupid fucking bitch. I hate that cunt!\" She then terminated interview.          Medications:       - Psychiatric Emergency -   Does not apply See Admin Instructions     fluticasone  1 puff Inhalation Daily     lamoTRIgine  50 mg Oral Daily     levonorgestrel-ethinyl estradiol  1 tablet Oral Daily     OLANZapine zydis  15 mg Oral BID    Or     OLANZapine  10 mg Intramuscular BID     propranolol  20 mg Oral TID          Allergies:     Allergies   Allergen Reactions     Nkda [No Known Drug Allergies]           Labs:     No results found for this or any previous visit (from the past 48 hour(s)).       Psychiatric Examination:     /58 (BP Location: Right arm)   Pulse 106   Temp 97.7  F (36.5  C) (Tympanic)   Resp " "16   Wt 62.4 kg (137 lb 9.6 oz)   SpO2 96%   BMI 21.55 kg/m    Weight is 137 lbs 9.6 oz  Body mass index is 21.55 kg/m .    Orthostatic Vitals     None          Appearance: awake, alert, adequately groomed.    Attitude: irritable, demanding, hostile  Eye Contact:  fair  Mood:  \"decent\"   Affect:  mood congruent, intensity is heightened, labile  Speech:  clear, coherent and normal prosody, rapid  Language: fluent and intact in English  Psychomotor, Gait, Musculoskeletal:  no evidence of tardive dyskinesia, dystonia, or tics, restless  Thought Process:  tangential, disorganized  Associations:  no loose associations  Thought Content:  no evidence of suicidal ideation or homicidal ideation and denies AVH, paranoia present and delusional thought content present  Insight:  limited  Judgement:  limited  Oriented to:  time, person, and place  Attention Span and Concentration:  fair  Recent and Remote Memory:  improving, still limited around events leading to hospitalization   Fund of Knowledge:  appropriate    Clinical Global Impressions  First:  Considering your total clinical experience with this particular patient population, how severe are the patient's symptoms at this time?: 6 (01/21/21 1437)  Compared to the patient's condition at the START of treatment, this patient's condition is: 3 (01/21/21 1437)  Most recent:  Considering your total clinical experience with this particular patient population, how severe are the patient's symptoms at this time?: 7 (01/29/21 1522)  Compared to the patient's condition at the START of treatment, this patient's condition is: 4 (01/29/21 1522)           Precautions:     Behavioral Orders   Procedures     Assault precautions     Code 1 - Restrict to Unit     Elopement precautions     Fall precautions     Routine Programming     As clinically indicated     Status 15     Every 15 minutes.          Diagnoses:     Bipolar I Disorder, current episode manic  Amphetamine use disorder, " severe  Cannabis use disorder  Akathisia   Foot pain/dry skin         Assessment & Plan:     Assessment and hospital summary:  This patient is a 25 year old woman with history of bipolar one disorder and polysubstance use (methamphetamine, alcohol, cocaine, heroin, cannabis) who presented to ED via EMS with agitation and aggression in context of methamphetamine and heroin use and likely medication nonadherence. Her symptoms of responding to internal stimuli, aggression, and disorganization are consistent with a manic episode with psychotic features with potential effects of amphetamine intoxication and opioid withdrawal. She has a history of multiple similar presentations in the ED recently but her symptoms have resolved more quickly, suggesting that her symptoms this time may be more due to her bipolar disorder than substance-induced. Alicia has a history of multiple similar presentations at prior hospitalizations. She has done well in these hospitalizations with an antipsychotic and sometimes a mood stabilizer. She has also required commitment and Carrillo for several of these hospitalizations. Therefore, based on her history and current presentation, she would likely benefit from a mood stabilizer and an antipsychotic to target her manic symptoms, agitation, and psychosis.      Alicia continued to have agitation after admission and declined Depakote. Given her imminent risk to herself and others and poor insight into her mental health, a petition for commitment and Carrillo were sent. As she was declining Depakote and requesting PTA Lamictal, Depakote was stopped and Lamictal initiated. Discussed need for adherence and that Lamictal is not effective in jackie prevention. Alicia began taking Zyprexa 5mg TID two days after admission. Gabapentin PRN was started to target opioid withdrawal as she had low-normal blood pressures and this would therefore be preferred over clonidine for symptoms management.      On 1/25,  "patient postured toward staff in an aggressive manner. She required seclusion and emergency neuroleptic medications. Psychiatric emergency was declared on 1/26, and Zyprexa was increased, and backed up with IM. Propranolol was also added to target anxiety and suspected akathisia. On 1/28 patient was threatening staff and wanting to leave room to \"attack\" staff, was not redirectable, required use of seclusion. She again required seclusion on 2/3 after becoming agitated and threatening to kill staff.    At this time, patient does not appear to be improving on current dose of Zyprexa. However, she is unwilling to consider any alternative neuroleptic medications or non-neuroleptic mood stabilizers. She becomes extremely agitated if we discuss medication options and abruptly terminates interview. Will continue to optimize Zyprexa with plan to transition to alternative neuroleptic medication once Carrillo is in place. Carrillo hearing is scheduled for 2/10.        Psychiatric treatment/inteventions:  Medications:   # Bipolar I Disorder:  -Resume Lamictal 50mg daily (initiated on 1/20 per pt request, and increased on 2/5). She understands that this is not first line treatment for jackie.   -Resume Zyprexa 15 mg BID PO or IM (most recently increased on 2/5).  Psychiatric emergency declared 1/26 as patient postured toward staff with the intent to assault them on 1/25. She may not decline medication.   -continue to discuss recommendation to start non-neuroleptic mood stabilizer outside of lamotrigine that would be first line for jackie, though patient has consistently declined due to concerns about weight gain.   -Continue propranolol 20 mg TID to target akathisia and anxiety  -continue hydroxyzine 25-50 mg q4h prn for acute anxiety  -continue melatonin PRN insomnia  -continue Zyprexa 10 mg q2h prn for severe agitation  -continue Haldol, Ativan, Benadryl PRN severe agitation/aggression     Laboratory/Imaging: Ordered repeat COVID " per protocol given pt has been hospitalized >1 week      Patient will be treated in therapeutic milieu with appropriate individual and group therapies as described.     Medical treatment/interventions:  Medical concerns:   # UTI, resolved:  -pt denying any further urinary symptoms   - completed treatment with Bactrim      # Migraines:  -continue PTA sumatriptan PRN     #Opioid Withdrawal, appears resolved:   -continue Gabapentin 300mg TID PRN opioid withdrawal symptoms  -continue PRN clonidine, Imodium, Zofran, Tylenol  -Discontinued COWS 1/22 given absence of significant withdrawal    #Dry Feet  -ordered PRN Eucerin cream, continue to monitor     Because of behavior leading to a seclusion or restraint episode on 1/25, we have made the following change to the treatment plan: psychiatric emergency was declared 1/26 and pts Zyprexa was increased and backed up with IM. Team petitioning for commitment and Carrillo. Pt had another episode of seclusion on 1/28, medication being adjusted to help decrease agitation, commitment and Carrillo continues to be pursued.     This note was created by undersigned using a Dragon dictation system. All typing errors or contextual distortion are unintentional and software inherent.     Disposition Plan   Reason for ongoing admission: poses an imminent risk to self, poses an imminent risk to others and is unable to care for self due to severe psychosis or jackie  Discharge location: TBD, may pursue CARE given pts poor insight/judgement and severity of symptoms  Discharge Medications: not ordered  Follow-up Appointments: not scheduled  Legal Status: court hold     Entered by: Alicia Reveles on 2/9/2021 at 4:09 PM       Video-Visit Details    Type of service:  Video Visit    Video Start Time (time video started): 1317    Video End Time (time video stopped): 1322    Originating Location (pt. Location): [address]    Distant Location (provider location): Provider remote  location    Mode of Communication:  Video Conference via [employer]    Physician has received verbal consent for a Video Visit from the patient? Yes

## 2021-02-09 NOTE — PROGRESS NOTES
received paperwork from patient's  requesting this paperwork be given to patient.  Writer met with patient to give her this paperwork and told her to review the paperwork and then call her  as requested.

## 2021-02-10 PROCEDURE — 250N000013 HC RX MED GY IP 250 OP 250 PS 637: Performed by: PSYCHIATRY & NEUROLOGY

## 2021-02-10 PROCEDURE — 99233 SBSQ HOSP IP/OBS HIGH 50: CPT | Mod: 95 | Performed by: PSYCHIATRY & NEUROLOGY

## 2021-02-10 PROCEDURE — 124N000002 HC R&B MH UMMC

## 2021-02-10 RX ADMIN — LORAZEPAM 1 MG: 1 TABLET ORAL at 01:46

## 2021-02-10 RX ADMIN — PROPRANOLOL HYDROCHLORIDE 20 MG: 20 TABLET ORAL at 08:54

## 2021-02-10 RX ADMIN — NICOTINE POLACRILEX 4 MG: 2 GUM, CHEWING ORAL at 20:58

## 2021-02-10 RX ADMIN — ACETAMINOPHEN 650 MG: 325 TABLET, FILM COATED ORAL at 14:42

## 2021-02-10 RX ADMIN — DIPHENHYDRAMINE HYDROCHLORIDE 50 MG: 50 CAPSULE ORAL at 01:46

## 2021-02-10 RX ADMIN — NICOTINE POLACRILEX 4 MG: 2 GUM, CHEWING ORAL at 18:17

## 2021-02-10 RX ADMIN — DIPHENHYDRAMINE HYDROCHLORIDE 50 MG: 50 CAPSULE ORAL at 23:43

## 2021-02-10 RX ADMIN — OLANZAPINE 15 MG: 10 TABLET, ORALLY DISINTEGRATING ORAL at 19:11

## 2021-02-10 RX ADMIN — PROPRANOLOL HYDROCHLORIDE 20 MG: 20 TABLET ORAL at 14:40

## 2021-02-10 RX ADMIN — NICOTINE POLACRILEX 4 MG: 2 GUM, CHEWING ORAL at 09:08

## 2021-02-10 RX ADMIN — OLANZAPINE 15 MG: 10 TABLET, ORALLY DISINTEGRATING ORAL at 08:54

## 2021-02-10 RX ADMIN — PROPRANOLOL HYDROCHLORIDE 20 MG: 20 TABLET ORAL at 19:11

## 2021-02-10 RX ADMIN — LAMOTRIGINE 50 MG: 25 TABLET ORAL at 08:54

## 2021-02-10 RX ADMIN — FLUTICASONE FUROATE 1 PUFF: 100 POWDER RESPIRATORY (INHALATION) at 09:08

## 2021-02-10 ASSESSMENT — ACTIVITIES OF DAILY LIVING (ADL)
HYGIENE/GROOMING: HANDWASHING;INDEPENDENT
HYGIENE/GROOMING: HANDWASHING;SHOWER;INDEPENDENT
ORAL_HYGIENE: INDEPENDENT
ORAL_HYGIENE: INDEPENDENT
LAUNDRY: UNABLE TO COMPLETE
DRESS: SCRUBS (BEHAVIORAL HEALTH)
DRESS: SCRUBS (BEHAVIORAL HEALTH);INDEPENDENT

## 2021-02-10 NOTE — PROGRESS NOTES
"[facility], [place]   Psychiatric Progress Note  Hospital Day: 23        Interim History:   The patient's care was discussed with the treatment team during the daily team meeting and/or staff's chart notes were reviewed.  Staff report patient participated in music therapy and dedicated a song to unborn child who  10 years ago with mood incongruent affect. Social in milieu. She requested evening medications early, feeling tense and restless in evening. Used headphones. She was not overtly aggressive or violent toward others, and thus did not require seclusion or restraints. Redirectable after yelling in milieu.   Slept 4.75 hours - woke up at 1:25am - ate sandwich.   PRNs: olanzapine 5mg PO at 11:53pm, benadryl and lorazepam 1mg at 1:45am (woke up, yelling, difficulty sleeping).  Scheduled: took as prescribed    Upon interview, Alicia opens by asking for her shoes. She says she saw another patient wearing their own slippers \"and got jealous.\" She requests double meal portions at dinner and PB&J sandwich in the afternoon. Alicia says her mood is \"alright\" and reports her sleep was \"alright.\" She denies feeling overly sedated this morning. When asked about events leading to this hospitalization, she says \"I was doing too many drugs. Then EMS came and gave me ketamine. She says the \"hotel lady\" called EMS. Currently, she feels \"anxious, restless, tired.\" When discussing her ongoing manic symptoms, she becomes agitated towards the end of the interview, saying, \"why would you think manic symptoms would go away? Naturally, when I have a bad trip, this happens. Lady, you can't keep me in the hospital for three months! B----, I'm not staying here for three months. And don't forget about my shoes!\"     Returned call to mom  - [phone] at 3:40pm-4:10pm  - would like to schedule Zoom with Margarita at some point  - asks for more general information about antipsychotic medications, court " "process, barney proceedings.   - shares that weight gain secondary to antipsychotic meds has been distressing. Shares that Alicia is firmly against taking haloperidol and is concerned about allergic reaction to medications.             Medications:       - Psychiatric Emergency -   Does not apply See Admin Instructions     fluticasone  1 puff Inhalation Daily     lamoTRIgine  50 mg Oral Daily     levonorgestrel-ethinyl estradiol  1 tablet Oral Daily     OLANZapine zydis  15 mg Oral BID    Or     OLANZapine  10 mg Intramuscular BID     propranolol  20 mg Oral TID          Allergies:     Allergies   Allergen Reactions     Nkda [No Known Drug Allergies]           Labs:     No results found for this or any previous visit (from the past 48 hour(s)).       Psychiatric Examination:     /75   Pulse 85   Temp 98.1  F (36.7  C)   Resp 16   Wt 62.4 kg (137 lb 9.6 oz)   SpO2 96%   BMI 21.55 kg/m    Weight is 137 lbs 9.6 oz  Body mass index is 21.55 kg/m .    Appearance: awake, alert, adequately groomed.  Frequently moving so camera is not on her face. Agreeable to moving camera so that her face is in the picture.  Eye Contact:poor   Mood:  \"good\"   Affect:  Irritable, labile, intense.  Speech:  clear, coherent and normal prosody, rapid  Language: fluent and intact in English  Psychomotor, Gait, Musculoskeletal:  no evidence of tardive dyskinesia, dystonia, or tics, restless and fidgeting, moving positions frequently.   Thought Process:  tangential, disorganized  Associations:  no loose associations  Thought Content:  no evidence of suicidal ideation or homicidal ideation and denies AVH, paranoia present no overt delusions expressed today.  Insight:  limited  Judgement:  limited  Oriented to:  time, person, and place  Attention Span and Concentration:  fair  Recent and Remote Memory:  improving, still limited around events leading to hospitalization   Fund of Knowledge:  appropriate    Clinical Global " Impressions  First:  Considering your total clinical experience with this particular patient population, how severe are the patient's symptoms at this time?: 6 (01/21/21 1437)  Compared to the patient's condition at the START of treatment, this patient's condition is: 3 (01/21/21 1437)  Most recent:  Considering your total clinical experience with this particular patient population, how severe are the patient's symptoms at this time?: 7 (01/29/21 1522)  Compared to the patient's condition at the START of treatment, this patient's condition is: 4 (01/29/21 1522)           Precautions:     Behavioral Orders   Procedures     Assault precautions     Code 1 - Restrict to Unit     Elopement precautions     Fall precautions     Routine Programming     As clinically indicated     Status 15     Every 15 minutes.          Diagnoses:     Bipolar I Disorder, current episode manic  Amphetamine use disorder, severe  Cannabis use disorder  Akathisia   Foot pain/dry skin         Assessment & Plan:     Assessment and hospital summary:  This patient is a 25 year old woman with history of bipolar one disorder and substance use use (methamphetamine, alcohol, cocaine, heroin, cannabis) who presented to ED via EMS with agitation and aggression in context of methamphetamine and heroin use and likely medication nonadherence. Her symptoms of responding to internal stimuli, aggression, and disorganization are consistent with a manic episode with psychotic features with potential effects of amphetamine intoxication and opioid withdrawal. She has a history of multiple similar presentations in the ED recently but her symptoms have resolved more quickly, suggesting that her symptoms this time may be more due to her bipolar disorder than substance-induced. Alicia has a history of multiple similar presentations at prior hospitalizations. She has done well in these hospitalizations with an antipsychotic and sometimes a mood stabilizer. She has  "also required commitment and Carrillo for several of these hospitalizations. Therefore, based on her history and current presentation, she would likely benefit from a mood stabilizer and an antipsychotic to target her manic symptoms, agitation, and psychosis.      Alicia continued to have agitation after admission and declined Depakote. Given her imminent risk to herself and others and poor insight into her mental health, a petition for commitment and Carrillo were sent. As she was declining Depakote and requesting PTA Lamictal, Depakote was stopped and Lamictal initiated. Discussed need for adherence and that Lamictal is not effective in jackie prevention. Alicia began taking Zyprexa 5mg TID two days after admission. Gabapentin PRN was started to target opioid withdrawal as she had low-normal blood pressures and this would therefore be preferred over clonidine for symptoms management.      On 1/25, patient postured toward staff in an aggressive manner. She required seclusion and emergency neuroleptic medications. Psychiatric emergency was declared on 1/26, and Zyprexa was increased, and backed up with IM. Propranolol was also added to target anxiety and suspected akathisia. On 1/28 patient was threatening staff and wanting to leave room to \"attack\" staff, was not redirectable, required use of seclusion. She again required seclusion on 2/3 after becoming agitated and threatening to kill staff.      At this time, patient is showing limited improvement on olanzapine daily olanzapine dose of 30-35mg/day. However, she is unwilling to consider any alternative neuroleptic medications or non-neuroleptic mood stabilizers. She becomes extremely agitated if we discuss medication options and abruptly terminates interview. We continued to optimize Zyprexa with plan to transition to alternative neuroleptic medication once Carrillo is in place. Carrillo hearing was 2/10. Will plan to initiate risperidone after Carrillo in place. "        Psychiatric treatment/inteventions:  Medications:   # Bipolar I Disorder, current manic episode  - Lamictal 50mg daily (initiated 25mg on 1/20 per pt request, and increased to 50mg on 2/5). She understands that this is not first line treatment for jackie.   -- increase to 75mg on 2/19   - Zyprexa 15 mg BID PO or IM (most recently increased on 2/5).  Psychiatric emergency declared 1/26 as patient postured toward staff with the intent to assault them on 1/25. She may not decline medication.   -continue to discuss recommendation to start non-neuroleptic mood stabilizer outside of lamotrigine that would be first line for jackie, though patient has consistently declined due to concerns about weight gain.   -Continue propranolol 20 mg TID to target akathisia and anxiety    PRNs  -continue hydroxyzine 25-50 mg q4h prn for acute anxiety  -continue melatonin PRN insomnia  -continue Zyprexa 10 mg q2h prn for severe agitation  -continue Haldol, Ativan, Benadryl PRN severe agitation/aggression     Laboratory/Imaging: Ordered repeat COVID per protocol given pt has been hospitalized >1 week      Patient will be treated in therapeutic milieu with appropriate individual and group therapies as described.     Medical treatment/interventions:  Medical concerns:   # UTI, resolved:  -pt denying any further urinary symptoms   - completed treatment with Bactrim      # Migraines:  -continue PTA sumatriptan PRN     #Opioid Withdrawal, appears resolved:   -continue Gabapentin 300mg TID PRN opioid withdrawal symptoms  -continue PRN clonidine, Imodium, Zofran, Tylenol  -Discontinued COWS 1/22 given absence of significant withdrawal    #Dry Feet  -ordered PRN Eucerin cream, continue to monitor     Because of behavior leading to a seclusion or restraint episode on 1/25, we have made the following change to the treatment plan: psychiatric emergency was declared 1/26 and pts Zyprexa was increased and backed up with IM. Team petitioning for  commitment and Carrillo. Pt had another episode of seclusion on 1/28, medication being adjusted to help decrease agitation, commitment and Carrillo continues to be pursued.       Disposition Plan   Reason for ongoing admission: poses an imminent risk to self, poses an imminent risk to others and is unable to care for self due to severe psychosis or jackie  Discharge location: pursuing CARE given pts poor insight/judgement and severity of symptoms  Discharge Medications: not ordered  Follow-up Appointments: not scheduled  Legal Status: court hold     Entered by: Jyothi Berger on 2/9/2021 at 8:47 AM       Video-Visit Details    Type of service:  Video Visit    Video Start Time (time video started): 11:50    Video End Time (time video stopped): 12:04    Originating Location (pt. Location): [address]    Distant Location (provider location): Provider remote location    Mode of Communication:  Video Conference via [employer]    Physician has received verbal consent for a Video Visit from the patient? Yes         This patient was seen and discussed with the attending physician.    Jyothi Berger MD   PGY-2 Psychiatry

## 2021-02-10 NOTE — PLAN OF CARE
Writer spoke with nursing supervisor to discuss patient having a gift bad brought to the hospital per her mother's request. As she will be hospitalized awhile waiting for CARE Program.  Writer called patient's mother to tell her the best plan would be for her to mail the gift bag due to Covid and hospital restrictions at this time.  Mother agreed to this plan and she will mail the gift bag

## 2021-02-10 NOTE — PLAN OF CARE
Pt received Zyprexa 5mg at 2351 per her request.      Pt woke at 0125 and had a sandwich and tea.      At 0146 pt began to yell in room. Pt identified that she was restless and couldn't sleep. Pt was given Benadryl 50mg and Ativan 1mg.      Appeared to be sleeping for 4.75 hours during the night. Breathing quiet and unlabored.          Pt on FALL, ASSAULT, and ELOPEMENT precautions in addition to single room order. Any related events noted above.      Will continue to monitor and assess.   Problem: Sleep Disturbance (Disruptive Behavior)  Goal: Improved Sleep (Disruptive Behavior)  Outcome: Declining

## 2021-02-10 NOTE — PLAN OF CARE
Problem: Behavioral Health Plan of Care  Goal: Plan of Care Review  Outcome: No Change  Flowsheets (Taken 2/9/2021 2156)  Plan of Care Reviewed With: patient  Patient Agreement with Plan of Care: agrees     Problem: Behavior Regulation Impairment (Disruptive Behavior)  Goal: Improved Impulse and Aggression Control (Disruptive Behavior)  Outcome: Improving  Flowsheets (Taken 2/7/2021 8493 by Alicia Reyes RN)  Mutually Determined Action Steps (Improved Impulse and Aggression Control): verbalizes insight re: need for meds    Patient visible in the milieu majority of the evening social with staff and peers, interactive during groups. Patient upon approach full range in affect, calm and social with check in. Patient reporting depression related to hospitalization today but denies any SI/SIB, or thoughts of harming others. Patient also reporting feeling tense and restless requesting HS medications early and receptive to headphones as a coping mechanism in addition to medications which appeared to be therapeutic. Patient had no verbal hostility towards staff this evening though was redirected twice for yelling and language while using the phone which she was receptive of. Patient independent with identifying needs and completing ADL's.

## 2021-02-10 NOTE — PLAN OF CARE
Music Therapy Group note    Total time in session: 40 minutes    Number of patients in group: 3    Scope of service: cognitive-behavioral, humanistic    Patient progress: initial encounter    Patient response/reaction to treatment intervention(s): Alicia dedicated a song in group to her unborn baby/son who  10 years ago.  Her affect was somewhat incongruent, indicating possible complicated trama.  Would recommend trauma-based therapy intensive in aftercare.      Other details:  Alicia presented with an intense affect, but was redirectable and ultimately did well in group today.     Angelia Wilburn, MT-BC  Board-Certified Music Therapist

## 2021-02-10 NOTE — PLAN OF CARE
Work Completed: Attended Team Meeting: Reviewed chart notes: Spoke with [facility] to discuss Carrillo     Discharge plan or goal: CARE Program:                  Barriers to discharge:  Civil Commitment with Carrillo initiated this hospitalization. Acuity of symptoms. Patient is having her 2nd Carrillo Hearing today through [facility]

## 2021-02-10 NOTE — PLAN OF CARE
Problem: OT General Care Plan  Goal: OT Goal 1  Description: Due to limited understanding regarding reasons for current admission, pt will attend OT group to gain self awareness and insight into reasons for hospitalization, and will begin to identify areas where unstable mental health has affected life management skills.    Pt was in and out of morning groups, attending an hour total.  Pt was able to focus on task for only a few minutes at a time, moved from table workspace to side table workspace, then would walk around and listen to headphones.  Pt does not engage in topic/discussion groups.    2/10/2021 1353 by Veronica Del Valle OT  Outcome: Improving

## 2021-02-10 NOTE — PLAN OF CARE
"Pt has a rather flat affect. She appears sedated. Her mood is \"ok.\" Pt was up for bkfst, then went back to bed. She asked for nicotine gum, and forgot she asked for it. She denied all MH sx, but was not conversational. See [facility], for CP review.    1214) Pt had a call from her atty this am. She will have court this afternoon. She is somewhat more alert now; eating lunch in her rm. Pt said she attends afternoon grps. Pt hinted at wanting a med for anxiety, but let this go. She does not outwardly appear anxious; movements/speech still somewhat slow. Pt asked for warm pack, for c/o menstrual cramps; declined prn med for this. Affect is bland. Pt is cooperative, but does not expand re her status, during check in.  "

## 2021-02-11 PROCEDURE — 250N000013 HC RX MED GY IP 250 OP 250 PS 637: Performed by: PSYCHIATRY & NEUROLOGY

## 2021-02-11 PROCEDURE — H2032 ACTIVITY THERAPY, PER 15 MIN: HCPCS

## 2021-02-11 PROCEDURE — G0177 OPPS/PHP; TRAIN & EDUC SERV: HCPCS

## 2021-02-11 PROCEDURE — 124N000002 HC R&B MH UMMC

## 2021-02-11 PROCEDURE — 99233 SBSQ HOSP IP/OBS HIGH 50: CPT | Mod: 95 | Performed by: PSYCHIATRY & NEUROLOGY

## 2021-02-11 RX ADMIN — OLANZAPINE 15 MG: 10 TABLET, ORALLY DISINTEGRATING ORAL at 10:40

## 2021-02-11 RX ADMIN — PROPRANOLOL HYDROCHLORIDE 20 MG: 20 TABLET ORAL at 10:42

## 2021-02-11 RX ADMIN — LEVONORGESTREL AND ETHINYL ESTRADIOL 1 TABLET: KIT at 10:42

## 2021-02-11 RX ADMIN — LAMOTRIGINE 50 MG: 25 TABLET ORAL at 10:41

## 2021-02-11 RX ADMIN — PROPRANOLOL HYDROCHLORIDE 20 MG: 20 TABLET ORAL at 20:48

## 2021-02-11 RX ADMIN — NICOTINE POLACRILEX 4 MG: 2 GUM, CHEWING ORAL at 14:30

## 2021-02-11 RX ADMIN — PROPRANOLOL HYDROCHLORIDE 20 MG: 20 TABLET ORAL at 16:46

## 2021-02-11 RX ADMIN — FLUTICASONE FUROATE 1 PUFF: 100 POWDER RESPIRATORY (INHALATION) at 10:41

## 2021-02-11 RX ADMIN — OLANZAPINE 15 MG: 10 TABLET, ORALLY DISINTEGRATING ORAL at 19:00

## 2021-02-11 RX ADMIN — NICOTINE POLACRILEX 4 MG: 2 GUM, CHEWING ORAL at 18:24

## 2021-02-11 RX ADMIN — ACETAMINOPHEN 650 MG: 325 TABLET, FILM COATED ORAL at 14:18

## 2021-02-11 RX ADMIN — NICOTINE POLACRILEX 4 MG: 2 GUM, CHEWING ORAL at 16:49

## 2021-02-11 RX ADMIN — ACETAMINOPHEN 650 MG: 325 TABLET, FILM COATED ORAL at 18:22

## 2021-02-11 RX ADMIN — HYDROXYZINE HYDROCHLORIDE 50 MG: 25 TABLET, FILM COATED ORAL at 12:57

## 2021-02-11 RX ADMIN — NICOTINE POLACRILEX 4 MG: 2 GUM, CHEWING ORAL at 20:51

## 2021-02-11 RX ADMIN — NICOTINE POLACRILEX 4 MG: 2 GUM, CHEWING ORAL at 10:21

## 2021-02-11 RX ADMIN — NICOTINE POLACRILEX 4 MG: 2 GUM, CHEWING ORAL at 12:40

## 2021-02-11 RX ADMIN — MELATONIN TAB 3 MG 3 MG: 3 TAB at 20:48

## 2021-02-11 ASSESSMENT — ACTIVITIES OF DAILY LIVING (ADL)
DRESS: SCRUBS (BEHAVIORAL HEALTH)
ORAL_HYGIENE: INDEPENDENT
LAUNDRY: UNABLE TO COMPLETE
HYGIENE/GROOMING: INDEPENDENT
DRESS: SCRUBS (BEHAVIORAL HEALTH)
LAUNDRY: UNABLE TO COMPLETE
HYGIENE/GROOMING: HANDWASHING;SHOWER;INDEPENDENT
ORAL_HYGIENE: INDEPENDENT

## 2021-02-11 NOTE — PLAN OF CARE
Pt slept in this morning and refused early morning VS and medications. Pt was willing to have VS and morning medications around 1040. VS WNL. Pt was heard angrily yelling at her provider during rounds. Pt did not want to talk about taking a new antipsychotic medication and was upset about Ativan being dropped from her PRN medications. Pt was able to emotionally regulate herself and came out of her room calm. No other outbursts. Pt socialized with peers and staff. Attended groups.     Pt's affect was blunted. Mood was labile, irritable and calm. Pt denied SI/SIB and urges to hurt others. Pt denied AVH.     Pt eating and drinking well. Pt denied pain and acute physical concerns. Pt denied side effects and none were observed. Will continue to monitor.    PRNs: 1300 hydroxyzine 50 mg po for anxiety. Pt felt some relief. 1415 Tylenol 650 mg po for headache. Pt felt relief.

## 2021-02-11 NOTE — PLAN OF CARE
Pt briefly attended the structured Therapeutic Recreation group with a focus on communication skills and social engagement. Pt participated in the social activity and discussion prompts for approximately x15 minutes before leaving the area. Pt interacted appropriately.

## 2021-02-11 NOTE — PROGRESS NOTES
Pt participated in dance/movement therapy (DMT) with a focus on song selection and body awareness while listening to lyrics, rhythms, and other musical elements.      Pt remained in the session most of the time, though self-regulated moving in and out as well.  She demonstrated significant improvement in boundaries and self-regulation, as well as more appropriate verbal sharing.       02/11/21 9398   Dance Movement Therapy   Type of Intervention structured groups   Hours 1

## 2021-02-11 NOTE — PLAN OF CARE
"  Problem: OT General Care Plan  Goal: OT Goal 1  Description: Due to limited understanding regarding reasons for current admission, pt will attend OT group to gain self awareness and insight into reasons for hospitalization, and will begin to identify areas where unstable mental health has affected life management skills.    Pt attended morning topic group, in and out a few times per usual.  Pt has difficulty responding insightfully with any sort of topic.  Question presented \"3 things you can do to make your corner of the world a better place?\", as pt was filling out her menu, mixing sugar and tea and eating at the same time, she replied \"give a hug, be nice, and party on!\" and was unable to give much further information on her thoughts, or really any other question about how she is doing - other than her frustration of how long she might be in the hospital before transfer to the CARE program.    Outcome: No Change     "

## 2021-02-11 NOTE — PROGRESS NOTES
"[facility], [place]   Psychiatric Progress Note  Hospital Day: 24        Interim History:   The patient's care was discussed with the treatment team during the daily team meeting and/or staff's chart notes were reviewed.   Staff report: patient briefly participated appropriately in Group, calmer and cooperative with staff. Pacing for \"restlessness.\"     Slept: 5.75 hours  PRNs: benadryl x 1 at 11:45pm, nicorette  Scheduled meds: took as prescribed    Interview: Patient is initially sleepy with labile and agitated affect throughout interview.   She reports feeling\"fine.\" Reports sleepiness throughout the day. She is pacing in the hallway due to feeling restless and anxious. Also reports right leg is \"I have to shake it and move it.\" Says only thing that helps it is Ativan. She estimates she is pacing 3-4 hours per day.  She says propranolol is helpful and she is not interested in increasing dose. She asks for shoes, and says medical team hasn't done anything for her here. Asks about discharge. Asks, \"why can't anyone give me a timeline?\" She says she met with [facility] yesterday and didn't get information about a timeline. Alicia states she will be hospitalized for 3 months if her medical team has anything to do with it. Asks about transitional housing at [facility], and then an IRTS. Asks if team can change her diagnosis to something about \"hypodermic needles\" to help \"bump me up in the line. There's no reason I shouldn't be first.\" Team discusses concern for her safety outside the hospital and rationale for pursuing CARE facility.    Discussing safety concerns with patient having shoes, and elopement risk. Patient says, \"I won't run if you give me freedom.\"    Very agitated when discussing Carrillo hearing. Patient says, \"I've been on all the f'ing medications and I hate them. I hate risperidone. It makes me eat everything in sight.\" Says, \"I'm not manic, bitch.\" Says the only medication she " "would consider taking is Abilify.     Team discusses plan to stop PRN Ativan in preparation for going to a CARE facility. Alicia says macy is okay as long as she has hydroxyzine and clonidine available.      Suicidal ideation: denies current or recent suicidal ideation or behaviors.  Homicidal ideation: denies current or recent homicidal ideation or behaviors.            Medications:       fluticasone  1 puff Inhalation Daily     lamoTRIgine  50 mg Oral Daily     levonorgestrel-ethinyl estradiol  1 tablet Oral Daily     OLANZapine zydis  15 mg Oral BID    Or     OLANZapine  10 mg Intramuscular BID     propranolol  20 mg Oral TID          Allergies:     Allergies   Allergen Reactions     Nkda [No Known Drug Allergies]           Labs:     No results found for this or any previous visit (from the past 48 hour(s)).       Psychiatric Examination:     /63 (BP Location: Left arm)   Pulse 100   Temp 97.8  F (36.6  C) (Oral)   Resp 18   Wt 62.4 kg (137 lb 9.6 oz)   SpO2 97%   BMI 21.55 kg/m    Weight is 137 lbs 9.6 oz  Body mass index is 21.55 kg/m .    Appearance: awake, alert, adequately groomed.  Initially appears drowsy.  Eye Contact:poor   Mood:  \"fine\"  Affect:  Irritable, labile, intense, hostile.  Speech:  clear, coherent and normal prosody, rapid , yelling at times  Language: fluent and intact in English  Psychomotor, Gait, Musculoskeletal:  no evidence of tardive dyskinesia, dystonia, or tics,mildly restless and fidgeting, moving positions frequently.   Thought Process:  tangential, disorganized  Associations:  no loose associations  Thought Content:  No evidence of SI/HI/paranoia, no overt delusions expressed today.  Insight:  limited \"I'm not manic!!\"   Judgement:  limited  Oriented to:  time, person, and place  Attention Span and Concentration:  fair  Recent and Remote Memory:  improving, still limited around events leading to hospitalization   Fund of Knowledge:  appropriate    Clinical Global " Impressions  First:  Considering your total clinical experience with this particular patient population, how severe are the patient's symptoms at this time?: 6 (01/21/21 1437)  Compared to the patient's condition at the START of treatment, this patient's condition is: 3 (01/21/21 1437)  Most recent:  Considering your total clinical experience with this particular patient population, how severe are the patient's symptoms at this time?: 7 (01/29/21 1522)  Compared to the patient's condition at the START of treatment, this patient's condition is: 4 (01/29/21 1522)           Precautions:     Behavioral Orders   Procedures     Assault precautions     Code 1 - Restrict to Unit     Elopement precautions     Fall precautions     Routine Programming     As clinically indicated     Status 15     Every 15 minutes.          Diagnoses:     Bipolar I Disorder, current episode manic  Amphetamine use disorder, severe  Cannabis use disorder  Akathisia   Foot pain/dry skin         Assessment & Plan:     Assessment and hospital summary:  This patient is a 25 year old woman with history of bipolar one disorder and substance use use (methamphetamine, alcohol, cocaine, heroin, cannabis) who presented to ED via EMS with agitation and aggression in context of methamphetamine and heroin use and likely medication nonadherence. Her symptoms of responding to internal stimuli, aggression, and disorganization are consistent with a manic episode with psychotic features with potential effects of amphetamine intoxication and opioid withdrawal. She has a history of multiple similar presentations in the ED recently but her symptoms have resolved more quickly, suggesting that her symptoms this time may be more due to her bipolar disorder than substance-induced. Alicia has a history of multiple similar presentations at prior hospitalizations. She has done well in these hospitalizations with an antipsychotic and sometimes a mood stabilizer. She has  "also required commitment and Barney for several of these hospitalizations. Therefore, based on her history and current presentation, she would likely benefit from a mood stabilizer and an antipsychotic to target her manic symptoms, agitation, and psychosis.      Alicia continued to have agitation after admission and declined Depakote. Given her imminent risk to herself and others and poor insight into her mental health, a petition for commitment and Barney were sent. As she was declining Depakote and requesting PTA Lamictal, Depakote was stopped and Lamictal initiated. Discussed need for adherence and that Lamictal is not effective in jackie prevention. Alicia began taking Zyprexa 5mg TID two days after admission. Gabapentin PRN was started to target opioid withdrawal as she had low-normal blood pressures and this would therefore be preferred over clonidine for symptoms management.      On 1/25, patient postured toward staff in an aggressive manner. She required seclusion and emergency neuroleptic medications. Psychiatric emergency was declared on 1/26, and Zyprexa was increased, and backed up with IM. Propranolol was also added to target anxiety and suspected akathisia. On 1/28 patient was threatening staff and wanting to leave room to \"attack\" staff, was not redirectable, required use of seclusion. She again required seclusion on 2/3 after becoming agitated and threatening to kill staff.  Psychiatric emergency declared 1/26 as patient postured toward staff with the intent to assault them on 1/25.  Pt had another episode of seclusion on 1/28.  Team pursued commitment with barney, which was granted by [facility] starting 2/11/21.    At this time, patient is showing limited improvement on olanzapine daily olanzapine dose of 30-35mg/day. However, she is unwilling to consider any alternative neuroleptic medications or non-neuroleptic mood stabilizers. She is not interested in Depakote or lithium because of weight " gain. She becomes extremely agitated if we discuss medication options and abruptly terminates interview. We are considering cross titrating to risperidone as mother reports she did well on risperidone in the past, however at this time Alicia is extremely opposed to a medication change. As she is beginning to demonstrate some subtle improvements (participated in group for 15min, less aggression and hostility towards staff), we will continue maximum olanzapine dose for now. Patient did mention she prefers Abilify to other antipsychotics, so we will keep this in consideration.     Psychiatric treatment/inteventions:    Today's changes  - stop PRN lorazepam. No need for taper as she has been taking it 3 weeks and only took 1 dose in last 72h without withdrawal symptoms.   - Allow patient access to personal shoes after discussion with nursing and staff.     Medications:   # Bipolar I Disorder, current manic episode  - Lamictal 50mg daily (initiated 25mg on 1/20 per pt request, and increased to 50mg on 2/5). She understands that this is not first line treatment for jackie.   -- increase to 75mg on 2/19   - Zyprexa 15 mg BID PO or IM (most recently increased on 2/5). She has Carrillo that includes olanzapine and she may not decline medication.   -continue to discuss recommendation to start non-neuroleptic mood stabilizer outside of lamotrigine that would be first line for jackie, though patient has consistently declined due to concerns about weight gain.   -Continue propranolol 20 mg TID to target akathisia and anxiety    PRNs  -continue hydroxyzine 25-50 mg q4h prn for acute anxiety  -continue melatonin PRN insomnia  -continue Zyprexa 10 mg q2h prn for severe agitation  -continue Haldol, Ativan, Benadryl PRN severe agitation/aggression     Laboratory/Imaging: Ordered repeat COVID per protocol given pt has been hospitalized >1 week      Patient will be treated in therapeutic milieu with appropriate individual and group  therapies as described.     Medical treatment/interventions:  Medical concerns:   # UTI, resolved:  -pt denying any further urinary symptoms   - completed treatment with Bactrim      # Migraines:  -continue PTA sumatriptan PRN     #Opioid Withdrawal, appears resolved:   -continue Gabapentin 300mg TID PRN opioid withdrawal symptoms  -continue PRN clonidine 0.1mg BID, Imodium, Zofran, Tylenol  -Discontinued COWS 1/22 given absence of significant withdrawal    #Dry Feet  -ordered PRN Eucerin cream, continue to monitor       Disposition Plan   Reason for ongoing admission: poses an imminent risk to self, poses an imminent risk to others and is unable to care for self due to severe psychosis or jackie  Discharge location: pursuing CARE given pts poor insight/judgement and severity of symptoms  Discharge Medications: not ordered  Follow-up Appointments: not scheduled  Legal Status: full commitment with [facility]illo for aripiprazole, risperidone, olanzapine, clozapine, haloperidol.     Video-Visit Details    Type of service:  Video Visit    Video Start Time (time video started): 10:10am    Video End Time (time video stopped): 10:27am    Originating Location (pt. Location): [address]    Distant Location (provider location): Provider remote location    Mode of Communication:  Video Conference via [employer]    Physician has received verbal consent for a Video Visit from the patient? Yes         This patient was seen and discussed with the attending physician.    Jyothi Berger MD   PGY-2 Psychiatry

## 2021-02-11 NOTE — PLAN OF CARE
Work Completed:  Attended Team Meeting: Reviewed chart notes: Reviewed court paperwork and barney.     Discharge plan or goal:  CARE Program                Barriers to discharge: Acuity of symptoms. Patient is in the civil commitment process with [facility].

## 2021-02-11 NOTE — PLAN OF CARE
"  Problem: Behavioral Health Plan of Care  Goal: Plan of Care Review  Outcome: Improving  Flowsheets (Taken 2/10/2021 1900)  Plan of Care Reviewed With: patient  Patient Agreement with Plan of Care: agrees     Problem: Behavior Regulation Impairment (Disruptive Behavior)  Goal: Improved Impulse and Aggression Control (Disruptive Behavior)  Outcome: Improving  Flowsheets (Taken 2/7/2021 2343 by Alicia Reyes RN)  Mutually Determined Action Steps (Improved Impulse and Aggression Control): verbalizes insight re: need for meds     Patient visible in the milieu throughout the evening pacing the halls listening to music and socializing with staff and peers. Patient upon approach calm and cooperative with check in. Patient reports feeling \"less manic and more organized\" with current medications. Patient hopeful for placement and demonstrating insight into care plan. Patient denies SI/SIB, AH/VH, anxiety or depression but does still report feeling restless at times. Patient identifies pacing as therapeutic for restlessness. Patient independent with and accepting of HS medications with no stated or observed side effects.  Patient independent with identifying needs and completing ADL's.  "

## 2021-02-11 NOTE — PLAN OF CARE
Problem: Sleep Disturbance (Disruptive Behavior)  Goal: Improved Sleep (Disruptive Behavior)  Outcome: No Change     Pt was awake at the beginning of the shift, requested for benadryl, writer administered PRN. Pt requested for a sandwich, writer gave her a sandwich before she went back to bed. Pt had bathroom breaks, overall calm.   Pt slept for 5.75 hours

## 2021-02-11 NOTE — PROGRESS NOTES
Writer received paperwork from patient's  requesting patient be given this paperwork. CTC met with patient to explain paperwork and informed her to call her  per his request.

## 2021-02-12 PROCEDURE — G0177 OPPS/PHP; TRAIN & EDUC SERV: HCPCS

## 2021-02-12 PROCEDURE — U0005 INFEC AGEN DETEC AMPLI PROBE: HCPCS | Performed by: STUDENT IN AN ORGANIZED HEALTH CARE EDUCATION/TRAINING PROGRAM

## 2021-02-12 PROCEDURE — 124N000002 HC R&B MH UMMC

## 2021-02-12 PROCEDURE — 99233 SBSQ HOSP IP/OBS HIGH 50: CPT | Mod: 95 | Performed by: PSYCHIATRY & NEUROLOGY

## 2021-02-12 PROCEDURE — U0003 INFECTIOUS AGENT DETECTION BY NUCLEIC ACID (DNA OR RNA); SEVERE ACUTE RESPIRATORY SYNDROME CORONAVIRUS 2 (SARS-COV-2) (CORONAVIRUS DISEASE [COVID-19]), AMPLIFIED PROBE TECHNIQUE, MAKING USE OF HIGH THROUGHPUT TECHNOLOGIES AS DESCRIBED BY CMS-2020-01-R: HCPCS | Performed by: STUDENT IN AN ORGANIZED HEALTH CARE EDUCATION/TRAINING PROGRAM

## 2021-02-12 PROCEDURE — 250N000013 HC RX MED GY IP 250 OP 250 PS 637: Performed by: PSYCHIATRY & NEUROLOGY

## 2021-02-12 RX ORDER — GABAPENTIN 300 MG/1
300 CAPSULE ORAL 3 TIMES DAILY PRN
Status: DISCONTINUED | OUTPATIENT
Start: 2021-02-12 | End: 2021-03-24 | Stop reason: HOSPADM

## 2021-02-12 RX ORDER — CLONIDINE HYDROCHLORIDE 0.1 MG/1
0.1 TABLET ORAL 2 TIMES DAILY PRN
Status: DISCONTINUED | OUTPATIENT
Start: 2021-02-12 | End: 2021-03-24 | Stop reason: HOSPADM

## 2021-02-12 RX ORDER — GABAPENTIN 300 MG/1
300 CAPSULE ORAL 3 TIMES DAILY PRN
Status: DISCONTINUED | OUTPATIENT
Start: 2021-02-12 | End: 2021-02-12

## 2021-02-12 RX ADMIN — PROPRANOLOL HYDROCHLORIDE 20 MG: 20 TABLET ORAL at 19:09

## 2021-02-12 RX ADMIN — NICOTINE POLACRILEX 4 MG: 2 GUM, CHEWING ORAL at 20:17

## 2021-02-12 RX ADMIN — NICOTINE POLACRILEX 4 MG: 2 GUM, CHEWING ORAL at 15:57

## 2021-02-12 RX ADMIN — NICOTINE POLACRILEX 4 MG: 2 GUM, CHEWING ORAL at 19:09

## 2021-02-12 RX ADMIN — NICOTINE POLACRILEX 4 MG: 2 GUM, CHEWING ORAL at 12:12

## 2021-02-12 RX ADMIN — NICOTINE POLACRILEX 4 MG: 2 GUM, CHEWING ORAL at 13:36

## 2021-02-12 RX ADMIN — NICOTINE POLACRILEX 4 MG: 2 GUM, CHEWING ORAL at 08:03

## 2021-02-12 RX ADMIN — ACETAMINOPHEN 650 MG: 325 TABLET, FILM COATED ORAL at 10:05

## 2021-02-12 RX ADMIN — NICOTINE POLACRILEX 4 MG: 2 GUM, CHEWING ORAL at 10:05

## 2021-02-12 RX ADMIN — PROPRANOLOL HYDROCHLORIDE 20 MG: 20 TABLET ORAL at 08:03

## 2021-02-12 RX ADMIN — LEVONORGESTREL AND ETHINYL ESTRADIOL 1 TABLET: KIT at 08:04

## 2021-02-12 RX ADMIN — NICOTINE POLACRILEX 4 MG: 2 GUM, CHEWING ORAL at 18:07

## 2021-02-12 RX ADMIN — OLANZAPINE 15 MG: 10 TABLET, ORALLY DISINTEGRATING ORAL at 08:03

## 2021-02-12 RX ADMIN — PROPRANOLOL HYDROCHLORIDE 20 MG: 20 TABLET ORAL at 13:45

## 2021-02-12 RX ADMIN — LAMOTRIGINE 50 MG: 25 TABLET ORAL at 08:03

## 2021-02-12 RX ADMIN — OLANZAPINE 15 MG: 10 TABLET, ORALLY DISINTEGRATING ORAL at 19:09

## 2021-02-12 RX ADMIN — FLUTICASONE FUROATE 1 PUFF: 100 POWDER RESPIRATORY (INHALATION) at 08:04

## 2021-02-12 RX ADMIN — DIPHENHYDRAMINE HYDROCHLORIDE 50 MG: 50 CAPSULE ORAL at 20:17

## 2021-02-12 ASSESSMENT — ACTIVITIES OF DAILY LIVING (ADL)
HYGIENE/GROOMING: HANDWASHING;SHOWER;INDEPENDENT
HYGIENE/GROOMING: INDEPENDENT;HANDWASHING;SHOWER
DRESS: SCRUBS (BEHAVIORAL HEALTH)
ORAL_HYGIENE: INDEPENDENT
LAUNDRY: UNABLE TO COMPLETE
ORAL_HYGIENE: INDEPENDENT
DRESS: SCRUBS (BEHAVIORAL HEALTH)
LAUNDRY: UNABLE TO COMPLETE

## 2021-02-12 NOTE — PROGRESS NOTES
Writer spoke with [facility] Pre-Admission Intake Staff to check on status of patient on the wait list for the CARE Program. Writer was informed that although, patient has been referred to multiple CARE Programs there are longer than usual wait lists at this time. CTC will continue to call weekly in order to check on status of patient on the wait list.

## 2021-02-12 NOTE — PROGRESS NOTES
"[facility], [place]   Psychiatric Progress Note  Hospital Day: 25        Interim History:   The patient's care was discussed with the treatment team during the daily team meeting and/or staff's chart notes were reviewed.   Staff report: Patient able to regulate emotions after becoming agitated during meeting with medical team yesterday. Participated in groups with limited attention span but overall more appropriate participation. \"Showing improvement in boundaries and self-regulation, as well as more appropriate verbal sharing.\" Continues to report frustration with no discharge date in sight. Feels improvement in anxiety.     Slept: 6 hours  PRNs: Tylenol x 2 for headache, hydroxyzine x 1 for anxiety, melatonin.   Scheduled meds: took as prescribed    Interview: Alicia is interviewed in her room. She says she is \"fine.\" Alicia reports she is looking forward to receiving slippers that her mom is bringing her. Asks to wear leggings in hospital and is receptive to information that only scrubs are allowed on station 12. She reports a headache over the last day and says Tylenol helped somewhat. She says she took sumatriptan for migraines in the past which helps, but she stopped taking it because \"it doesn' t mix with heroin.\" Today's headache similar to headaches she's had in the past but doesn't think it's a migraine. When asked if she is feeling LH or dizzy, initially says \"yeah\" and then \"no.\" Denies feeling near syncope or feeling LH when standing. Continues to feel very restless. She says she's experienced it in the past. Says \"5-6 out of 10\" in terms of how bothersome it is. \"elevating foot\" helps it. Additionally requests no Boost snacks and says \"zyprexa is making me fat.\"     Says she is feeling safe on the unit and getting alone with peers. Groups were \"good.\"     Requests updates about discharge and bed availability at [facility] facility. Becomes agitated and dysregulated, saying \"I " "don't care about your job. I'm not staying here for 3 months, lady.\"  Becomes agitated as we discuss discharge planning and medications for anxiety. Requests hydroxyzine first line, clonidine second line and gabapentin 3rd line for anxiety. Says clonidine helps most but she doesn't want to lower blood pressure even more. \"then says \"I don't care. I hate how you act like I've never tried these medications before. I've tried all of them.\" Ends interview abruptly.     Suicidal ideation: denies current or recent suicidal ideation or behaviors.  Homicidal ideation: denies current or recent homicidal ideation or behaviors.      Mother Michi Rangel called at 12pm (ONDINA signed) with update:  Says Alicia has told her aripiprazole caused her to chavez when not using drugs. Disappointed with behavior in court on weds, slightly better yesterday- able to laugh together yesterday. Short attention span when on phone with Mom. No delusions when talking to Mom.             Medications:       fluticasone  1 puff Inhalation Daily     lamoTRIgine  50 mg Oral Daily     levonorgestrel-ethinyl estradiol  1 tablet Oral Daily     OLANZapine zydis  15 mg Oral BID    Or     OLANZapine  10 mg Intramuscular BID     propranolol  20 mg Oral TID          Allergies:     Allergies   Allergen Reactions     Nkda [No Known Drug Allergies]           Labs:     No results found for this or any previous visit (from the past 48 hour(s)).       Psychiatric Examination:     /76 (BP Location: Left arm)   Pulse 101   Temp 98.2  F (36.8  C) (Tympanic)   Resp 16   Wt 62.4 kg (137 lb 9.6 oz)   SpO2 97%   BMI 21.55 kg/m    Weight is 137 lbs 9.6 oz  Body mass index is 21.55 kg/m .    Appearance: awake, alert, adequately groomed.  Initially appears drowsy, later alert.   Eye Contact: fair  Mood:  \"fine\"  Affect:  Irritable, labile, intense, hostile, sarcastic at times.   Speech:  clear, coherent and normal prosody, rapid, yelling at times  Language: fluent " and intact in English  Psychomotor, Gait, Musculoskeletal:  no evidence of tardive dyskinesia, dystonia, or ticsVisibly restless and fidgeting, moving positions frequently.   Thought Process: organized  Associations:  no loose associations  Thought Content:  No evidence of SI/HI/paranoia, no overt delusions expressed today.  Insight:  limited   Judgement:  limited  Oriented to:  time, person, and place  Attention Span and Concentration:  fair  Recent and Remote Memory:  improving, still limited around events leading to hospitalization   Fund of Knowledge:  appropriate    Clinical Global Impressions  First:  Considering your total clinical experience with this particular patient population, how severe are the patient's symptoms at this time?: 6 (01/21/21 1437)  Compared to the patient's condition at the START of treatment, this patient's condition is: 3 (01/21/21 1437)  Most recent:  Considering your total clinical experience with this particular patient population, how severe are the patient's symptoms at this time?: 7 (01/29/21 1522)  Compared to the patient's condition at the START of treatment, this patient's condition is: 4 (01/29/21 1522)           Precautions:     Behavioral Orders   Procedures     Assault precautions     Code 1 - Restrict to Unit     Elopement precautions     Fall precautions     Routine Programming     As clinically indicated     Status 15     Every 15 minutes.          Diagnoses:     Bipolar I Disorder, current episode manic  Amphetamine use disorder, severe  Cannabis use disorder  Akathisia   Foot pain/dry skin         Assessment & Plan:     Assessment and hospital summary:  This patient is a 25 year old woman with history of bipolar one disorder and substance use use (methamphetamine, alcohol, cocaine, heroin, cannabis) who presented to ED via EMS with agitation and aggression in context of methamphetamine and heroin use and likely medication nonadherence. Her symptoms of responding to  "internal stimuli, aggression, and disorganization are consistent with a manic episode with psychotic features with potential effects of amphetamine intoxication and opioid withdrawal. She has a history of multiple similar presentations in the ED recently but her symptoms have resolved more quickly, suggesting that her symptoms this time may be more due to her bipolar disorder than substance-induced. Alicia has a history of multiple similar presentations at prior hospitalizations. She has done well in these hospitalizations with an antipsychotic and sometimes a mood stabilizer. She has also required commitment and [facility]illo for several of these hospitalizations. Therefore, based on her history and current presentation, she would likely benefit from a mood stabilizer and an antipsychotic to target her manic symptoms, agitation, and psychosis.      Alicia continued to have agitation after admission and declined Depakote. Given her imminent risk to herself and others and poor insight into her mental health, a petition for commitment and [facility]illo were sent. As she was declining Depakote and requesting PTA Lamictal, Depakote was stopped and Lamictal initiated. Discussed need for adherence and that Lamictal is not effective in jackie prevention. Alicia began taking Zyprexa 5mg TID two days after admission. Gabapentin PRN was started to target opioid withdrawal as she had low-normal blood pressures and this would therefore be preferred over clonidine for symptoms management.      On 1/25, patient postured toward staff in an aggressive manner. She required seclusion and emergency neuroleptic medications. Psychiatric emergency was declared on 1/26, and Zyprexa was increased, and backed up with IM. Propranolol was also added to target anxiety and suspected akathisia. On 1/28 patient was threatening staff and wanting to leave room to \"attack\" staff, was not redirectable, required use of seclusion. She again required seclusion on " 2/3 after becoming agitated and threatening to kill staff.  Psychiatric emergency declared 1/26 as patient postured toward staff with the intent to assault them on 1/25.  Pt had another episode of seclusion on 1/28.  Team pursued commitment with ector, which was granted by [facility] starting 2/11/21.    At this time, patient is showing limited improvement on olanzapine daily olanzapine dose of 30-35mg/day. However, she is unwilling to consider any alternative neuroleptic medications or non-neuroleptic mood stabilizers. She is not interested in Depakote or lithium because of weight gain. She becomes extremely agitated if we discuss medication options and abruptly terminates interview. We are considering cross titrating to risperidone as mother reports she did well on risperidone in the past, however at this time Alicia is extremely opposed to a medication change. Patient did mention she prefers Abilify to other antipsychotics, so we will keep this in consideration. As she is beginning to demonstrate some subtle improvements (participated in group for 15min, less aggression and hostility towards staff), we will continue maximum olanzapine dose for now.     Psychiatric treatment/inteventions:    Today's changes  No medication changes    - stop Boost snacks per patient preference       Medications:   # Bipolar I Disorder, current manic episode  - Lamictal 50mg daily (initiated 25mg on 1/20 per pt request, and increased to 50mg on 2/5). She understands that this is not first line treatment for jackie.   -- increase to 75mg on 2/19   - Zyprexa 15 mg BID PO or 10mg IM (most recently increased on 2/5). She has Carrillo that includes olanzapine and she may not decline medication.   -continue to discuss recommendation to start non-neuroleptic mood stabilizer outside of lamotrigine that would be first line for jackie, though patient has consistently declined due to concerns about weight gain.   -Continue propranolol 20 mg  TID to target akathisia and anxiety    PRNs  -continue hydroxyzine 25-50 mg q4h prn for acute anxiety - first line  - clonidine 0.1mg BID PRN or anxiety - 2nd line for anxiety   - Gabapentin 300mg TID PRN - 3rd line for anxiety   -continue melatonin PRN insomnia  -continue Zyprexa 5mg tid prn for severe agitation  -continue Haldol 5, + 50 Benadryl PRN severe agitation/aggression  - lorazepam PRN discontinued 2/11     Laboratory/Imaging: Ordered repeat COVID per protocol given pt has been hospitalized >1 week      Patient will be treated in therapeutic milieu with appropriate individual and group therapies as described.     Medical treatment/interventions:  Medical concerns:   #headache  -acetaminophen 650mg q4h PRN     # UTI, resolved:  -pt denying any further urinary symptoms   - completed treatment with Bactrim      # Migraines:  -continue PTA sumatriptan PRN     #Opioid Withdrawal, appears resolved:   -continue Gabapentin 300mg TID PRN opioid withdrawal symptoms  -continue PRN clonidine 0.1mg BID, Imodium, Zofran, Tylenol  -Discontinued COWS 1/22 given absence of significant withdrawal    #Dry Feet  -ordered PRN Eucerin cream, continue to monitor       Disposition Plan   Reason for ongoing admission: poses an imminent risk to self, poses an imminent risk to others and is unable to care for self due to severe psychosis or jackie  Discharge location: pursuing [facility]ARE given pts poor insight/judgement and severity of symptoms  Discharge Medications: not ordered  Follow-up Appointments: not scheduled  Legal Status: full commitment with [facility]illo for aripiprazole, risperidone, olanzapine, clozapine, haloperidol.     Video-Visit Details    Type of service:  Video Visit    Video Start Time (time video started): 10:32am    Video End Time (time video stopped): 10:43am    Originating Location (pt. Location): [address]    Distant Location (provider location): Provider remote location    Mode of Communication:  Video  [facility]    Physician has received verbal consent for a Video Visit from the patient? Yes         This patient was seen and discussed with the attending physician.    Jyothi Berger MD   PGY-2 Psychiatry

## 2021-02-12 NOTE — PLAN OF CARE
Problem: Behavioral Health Plan of Care  Goal: Plan of Care Review  Outcome: Improving  Flowsheets (Taken 2/11/2021 2102)  Plan of Care Reviewed With: patient  Patient Agreement with Plan of Care: agrees     Patient visible majority of the evening in the milieu social with staff and peers. Patient upon approach calm and social during check in. She reports overall feeling improved and less anxious but does report some frustration with length of stay and hopeful for placement. Patient denies SI/SIB, AH/Vh, anxiety, depression, or thoughts of harming others. Patient had no observed delusional thoughts or comments this evening. Patient showing slight insight into care plan able to identify and accepting of current medications and plan for placement into a [facility] program. Patient reports long term goal to get back in touch with her CD sponsor and hopeful to abstain from using specifically identifying stopping use of methamphetamines. Patient independent in requesting and accepting of HS medications with no stated or observed side effects. Patient requesting PRN Tylenol this evening for a headache as well as Melatonin to aid sleep. Patient independent with identifying needs and completing ADL's.

## 2021-02-12 NOTE — PROGRESS NOTES
Patient appeared to be asleep during safety checks this shift. No complaints or concerns voiced by patient or noted by staff. Will continue to monitor and update if there are changes.

## 2021-02-12 NOTE — PLAN OF CARE
Problem: Behavior Regulation Impairment (Disruptive Behavior)  Goal: Improved Impulse and Aggression Control (Disruptive Behavior)  Outcome: Improving     Pt was up early and had breakfast and morning medications. Pt was out in the milieu and social with peers and staff. Pt was restless and paced the hallways listening to music. Pt attended groups. Pt ate all meals and independent at attending to ADLs. Pt's affect was blunted and mood was calm. Pt denied SI/SIB and urges to hurt others. Pt denied AVH. Pt was overheard yelling at her provider on the ipad for a few sentences, but then calmed herself down. She was arguing about PRN medications.    Pt has her menstrual period and received an order for tampons.     PRNs: 1000 Tylenol 650 mg po, for a headache and felt relief.    Will continue to monitor for safety.

## 2021-02-12 NOTE — PLAN OF CARE
"  Problem: OT General Care Plan  Goal: OT Goal 1  Description: Due to limited understanding regarding reasons for current admission, pt will attend OT group to gain self awareness and insight into reasons for hospitalization, and will begin to identify areas where unstable mental health has affected life management skills.    Pt was in and out of morning groups, attending an hour total.  During discussion of a review of progress, pt noted \"I haven't had any seclusions, so that's good, right? I'm not homicidal, who even comes to the hospital when they're homicidal? And I'm calm\".  Pt is highly distractible with activity, and needs constant prompting to clean up after herself with coffee, sugar, etc.  Pt has been walking the mitchell with a new male peer as a male peer she has been friendly with was just transferred.    Outcome: No Change     "

## 2021-02-12 NOTE — PLAN OF CARE
Work Completed: Attended Team Meeting: Reviewed chart notes: Spoke with [facility] Staff @ [facility] to check on status of patient on the wait list.     Discharge plan or goal:  [facility] Program                Barriers to discharge:  Patient is on the wait list for [facility] Program.  Patient is committed with [facility] through [facility].

## 2021-02-12 NOTE — PLAN OF CARE
Pt briefly attended the Therapeutic Recreation group this evening. Pt participated in a critical thinking game with 2 other patients. Pt interacted appropriately with others and was able to follow the 2 step instructions for the game. Pt took a few turns and then stated she was going to go take a nap.

## 2021-02-13 PROCEDURE — 250N000013 HC RX MED GY IP 250 OP 250 PS 637: Performed by: PSYCHIATRY & NEUROLOGY

## 2021-02-13 PROCEDURE — 124N000002 HC R&B MH UMMC

## 2021-02-13 RX ADMIN — NICOTINE POLACRILEX 4 MG: 2 GUM, CHEWING ORAL at 18:06

## 2021-02-13 RX ADMIN — NICOTINE POLACRILEX 4 MG: 2 GUM, CHEWING ORAL at 09:08

## 2021-02-13 RX ADMIN — NICOTINE POLACRILEX 4 MG: 2 GUM, CHEWING ORAL at 21:50

## 2021-02-13 RX ADMIN — LAMOTRIGINE 50 MG: 25 TABLET ORAL at 09:45

## 2021-02-13 RX ADMIN — DIPHENHYDRAMINE HYDROCHLORIDE 50 MG: 50 CAPSULE ORAL at 19:01

## 2021-02-13 RX ADMIN — NICOTINE POLACRILEX 4 MG: 2 GUM, CHEWING ORAL at 14:03

## 2021-02-13 RX ADMIN — OLANZAPINE 15 MG: 10 TABLET, ORALLY DISINTEGRATING ORAL at 19:01

## 2021-02-13 RX ADMIN — NICOTINE POLACRILEX 4 MG: 2 GUM, CHEWING ORAL at 12:04

## 2021-02-13 RX ADMIN — NICOTINE POLACRILEX 4 MG: 2 GUM, CHEWING ORAL at 16:15

## 2021-02-13 RX ADMIN — PROPRANOLOL HYDROCHLORIDE 20 MG: 20 TABLET ORAL at 14:03

## 2021-02-13 RX ADMIN — LEVONORGESTREL AND ETHINYL ESTRADIOL 1 TABLET: KIT at 09:45

## 2021-02-13 RX ADMIN — PROPRANOLOL HYDROCHLORIDE 20 MG: 20 TABLET ORAL at 19:01

## 2021-02-13 RX ADMIN — ALBUTEROL SULFATE 2 PUFF: 90 AEROSOL, METERED RESPIRATORY (INHALATION) at 18:06

## 2021-02-13 RX ADMIN — OLANZAPINE 15 MG: 10 TABLET, ORALLY DISINTEGRATING ORAL at 09:45

## 2021-02-13 RX ADMIN — PROPRANOLOL HYDROCHLORIDE 20 MG: 20 TABLET ORAL at 09:45

## 2021-02-13 RX ADMIN — DIPHENHYDRAMINE HYDROCHLORIDE 50 MG: 50 CAPSULE ORAL at 14:07

## 2021-02-13 RX ADMIN — FLUTICASONE FUROATE 1 PUFF: 100 POWDER RESPIRATORY (INHALATION) at 09:45

## 2021-02-13 ASSESSMENT — ACTIVITIES OF DAILY LIVING (ADL)
HYGIENE/GROOMING: INDEPENDENT
ORAL_HYGIENE: INDEPENDENT
ORAL_HYGIENE: INDEPENDENT
DRESS: SCRUBS (BEHAVIORAL HEALTH)
DRESS: SCRUBS (BEHAVIORAL HEALTH)
HYGIENE/GROOMING: HANDWASHING;SHOWER;INDEPENDENT
LAUNDRY: UNABLE TO COMPLETE

## 2021-02-13 NOTE — PROGRESS NOTES
Pt reported restless and feelings of constant moving in her legs. Appeared very uncomfortable at that time. Stood up several times in a brief conversation and was actively shaking her legs. Requested and was given PRN benadryl which she reported was helpful. She notes that she may need something more to help but it is partially resolved with benadryl.

## 2021-02-13 NOTE — PLAN OF CARE
"Pt is calm and appropriate on the unit throughout the shift. She presents as mildly irritable this AM when RN writer offered to bring her AM medications. She slept in until late in the morning. She generally declined to interact with RN writer about her ongoing care plan and well being. Discussed her medications and offered education about them. The patient is concerned that olanzapine is \"making her fat.\" Pt has gained about 10 pounds during her admission. Pt requested to be taken off the olanzapine and RN writer explained the commitment and informed her to speak with her provider about it. She dismissed the conversation at that point and walked away. Pt did not report other physical health concerns or side effects from medications today.   "

## 2021-02-13 NOTE — PLAN OF CARE
Problem: Behavioral Health Plan of Care  Goal: Plan of Care Review  Flowsheets  Taken 2/12/2021 1900  Plan of Care Reviewed With: patient  Patient Agreement with Plan of Care: agrees  Taken 2/12/2021 1843  Plan of Care Reviewed With: patient  Patient Agreement with Plan of Care: agrees     Patient social and interactive in the milieu majority of the evening watching television with peers and walking the mitchell. Patient upon approach full range in affect with calm demeanor and appearing engaged with assessment. Patient states increasingly feeling improved identifying that she no longer feels agitated and is goal oriented towards CD treatment. Patient able to identify plans after hospitalization to connect with positive influences including her mother and her sponsor. Patient also identifying plan to seek out employment to further aid sobriety. Patient identified that she often uses while gambling and identified gambling as her main trigger. Patient denied SI/SIB, AH/VH, anxiety, depression, or thoughts of harming others. Patient does still report occasional restlessness and requested PRN Benadryl to target the restlessness and aid sleep. Patient independent in requesting and accepting of HS medications with no stated or observed side effects. Patient independent in identifying needs and completing ADL's.

## 2021-02-14 PROCEDURE — H2032 ACTIVITY THERAPY, PER 15 MIN: HCPCS

## 2021-02-14 PROCEDURE — 250N000013 HC RX MED GY IP 250 OP 250 PS 637: Performed by: PSYCHIATRY & NEUROLOGY

## 2021-02-14 PROCEDURE — 124N000002 HC R&B MH UMMC

## 2021-02-14 PROCEDURE — 250N000013 HC RX MED GY IP 250 OP 250 PS 637: Performed by: STUDENT IN AN ORGANIZED HEALTH CARE EDUCATION/TRAINING PROGRAM

## 2021-02-14 RX ADMIN — NICOTINE POLACRILEX 4 MG: 2 GUM, CHEWING ORAL at 16:45

## 2021-02-14 RX ADMIN — PROPRANOLOL HYDROCHLORIDE 20 MG: 20 TABLET ORAL at 14:44

## 2021-02-14 RX ADMIN — OLANZAPINE 15 MG: 10 TABLET, ORALLY DISINTEGRATING ORAL at 09:29

## 2021-02-14 RX ADMIN — NICOTINE POLACRILEX 4 MG: 2 GUM, CHEWING ORAL at 14:44

## 2021-02-14 RX ADMIN — PROPRANOLOL HYDROCHLORIDE 20 MG: 20 TABLET ORAL at 19:23

## 2021-02-14 RX ADMIN — PROPRANOLOL HYDROCHLORIDE 20 MG: 20 TABLET ORAL at 09:29

## 2021-02-14 RX ADMIN — DIPHENHYDRAMINE HYDROCHLORIDE 50 MG: 50 CAPSULE ORAL at 12:17

## 2021-02-14 RX ADMIN — NICOTINE POLACRILEX 4 MG: 2 GUM, CHEWING ORAL at 15:36

## 2021-02-14 RX ADMIN — FLUTICASONE FUROATE 1 PUFF: 100 POWDER RESPIRATORY (INHALATION) at 09:29

## 2021-02-14 RX ADMIN — NICOTINE POLACRILEX 4 MG: 2 GUM, CHEWING ORAL at 19:26

## 2021-02-14 RX ADMIN — OLANZAPINE 15 MG: 10 TABLET, ORALLY DISINTEGRATING ORAL at 19:24

## 2021-02-14 RX ADMIN — NICOTINE POLACRILEX 4 MG: 2 GUM, CHEWING ORAL at 12:17

## 2021-02-14 RX ADMIN — DIPHENHYDRAMINE HYDROCHLORIDE 50 MG: 50 CAPSULE ORAL at 18:05

## 2021-02-14 RX ADMIN — NICOTINE POLACRILEX 4 MG: 2 GUM, CHEWING ORAL at 09:29

## 2021-02-14 RX ADMIN — NICOTINE POLACRILEX 4 MG: 2 GUM, CHEWING ORAL at 18:05

## 2021-02-14 RX ADMIN — LAMOTRIGINE 50 MG: 25 TABLET ORAL at 09:29

## 2021-02-14 RX ADMIN — HYDROXYZINE HYDROCHLORIDE 50 MG: 25 TABLET, FILM COATED ORAL at 16:45

## 2021-02-14 ASSESSMENT — ACTIVITIES OF DAILY LIVING (ADL)
HYGIENE/GROOMING: INDEPENDENT
LAUNDRY: UNABLE TO COMPLETE
ORAL_HYGIENE: INDEPENDENT
DRESS: SCRUBS (BEHAVIORAL HEALTH)
DRESS: SCRUBS (BEHAVIORAL HEALTH)
ORAL_HYGIENE: INDEPENDENT
HYGIENE/GROOMING: INDEPENDENT

## 2021-02-14 NOTE — PLAN OF CARE
Problem: Behavioral Health Plan of Care  Goal: Plan of Care Review  Outcome: Improving  Flowsheets (Taken 2/13/2021 4994)  Plan of Care Reviewed With: patient  Patient Agreement with Plan of Care: agrees     Patient calm and social interactive in the milieu majority of the evening. Patient upon approach calm and cooperative with verbal check in. Patient reports continued progress identifying positive mood towards treatment and hopefulness for placement soon. Patient denied SI/SIB, AH/VH, anxiety, depression or thoughts of harming others. Patient had no observed delusional statements or irritability this evening. Patient independent and accepting of HS medications. Patient requested PRN Benadryl with HS medications reporting it as therapeutic to target restlessness. Patient independent in identifying needs and completing ADL's.

## 2021-02-14 NOTE — PLAN OF CARE
Pt continues to appear about the same as yesterday. Her presentation is congruent with the past several days. She is appropriate and social in the milieu. She is accepting of her medications but asks again about getting off of olanzapine. The patient does appear restless and unable to sit still throughout the day and requested her PRN benadryl which she reports as partially helpful. Discussed her plan of going to a [facility] facility which is she seems agreeable to but generally sad about continuing to be in the hospital. No other emergent concerns this shift. No SI/SIB/HI. No behavioral concerns. She does not report any physical health concerns or other medications side effects today.

## 2021-02-15 PROCEDURE — 250N000013 HC RX MED GY IP 250 OP 250 PS 637: Performed by: PSYCHIATRY & NEUROLOGY

## 2021-02-15 PROCEDURE — 99232 SBSQ HOSP IP/OBS MODERATE 35: CPT | Mod: 95 | Performed by: PSYCHIATRY & NEUROLOGY

## 2021-02-15 PROCEDURE — 124N000002 HC R&B MH UMMC

## 2021-02-15 PROCEDURE — G0177 OPPS/PHP; TRAIN & EDUC SERV: HCPCS

## 2021-02-15 RX ORDER — BENZTROPINE MESYLATE 0.5 MG/1
0.5 TABLET ORAL 2 TIMES DAILY PRN
Status: DISCONTINUED | OUTPATIENT
Start: 2021-02-15 | End: 2021-03-24 | Stop reason: HOSPADM

## 2021-02-15 RX ADMIN — OLANZAPINE 15 MG: 10 TABLET, ORALLY DISINTEGRATING ORAL at 08:21

## 2021-02-15 RX ADMIN — NICOTINE POLACRILEX 4 MG: 2 GUM, CHEWING ORAL at 16:52

## 2021-02-15 RX ADMIN — NICOTINE POLACRILEX 4 MG: 2 GUM, CHEWING ORAL at 15:43

## 2021-02-15 RX ADMIN — LAMOTRIGINE 50 MG: 25 TABLET ORAL at 08:21

## 2021-02-15 RX ADMIN — NICOTINE POLACRILEX 4 MG: 2 GUM, CHEWING ORAL at 12:46

## 2021-02-15 RX ADMIN — NICOTINE POLACRILEX 4 MG: 2 GUM, CHEWING ORAL at 09:59

## 2021-02-15 RX ADMIN — NICOTINE POLACRILEX 4 MG: 2 GUM, CHEWING ORAL at 18:00

## 2021-02-15 RX ADMIN — NICOTINE POLACRILEX 4 MG: 2 GUM, CHEWING ORAL at 14:19

## 2021-02-15 RX ADMIN — NICOTINE POLACRILEX 4 MG: 2 GUM, CHEWING ORAL at 19:31

## 2021-02-15 RX ADMIN — FLUTICASONE FUROATE 1 PUFF: 100 POWDER RESPIRATORY (INHALATION) at 08:24

## 2021-02-15 RX ADMIN — NICOTINE POLACRILEX 4 MG: 2 GUM, CHEWING ORAL at 11:43

## 2021-02-15 RX ADMIN — PROPRANOLOL HYDROCHLORIDE 20 MG: 20 TABLET ORAL at 12:51

## 2021-02-15 RX ADMIN — OLANZAPINE 15 MG: 10 TABLET, ORALLY DISINTEGRATING ORAL at 19:30

## 2021-02-15 RX ADMIN — PROPRANOLOL HYDROCHLORIDE 20 MG: 20 TABLET ORAL at 19:30

## 2021-02-15 ASSESSMENT — ACTIVITIES OF DAILY LIVING (ADL)
HYGIENE/GROOMING: INDEPENDENT
ORAL_HYGIENE: INDEPENDENT
HYGIENE/GROOMING: INDEPENDENT
DRESS: SCRUBS (BEHAVIORAL HEALTH)
ORAL_HYGIENE: INDEPENDENT
LAUNDRY: UNABLE TO COMPLETE
LAUNDRY: UNABLE TO COMPLETE
DRESS: SCRUBS (BEHAVIORAL HEALTH)

## 2021-02-15 NOTE — PLAN OF CARE
Work Completed:  Attended Team Meeting:  Reviewed chart notes:     Discharge plan or goal:  [facility] Program                Barriers to discharge:  Plan is for patient to go door to door due to severity of her presentation and risk factors associated with her non-adherence of taking neuroleptic medication after discharge. Patient is in civil commitment with Gerardo.

## 2021-02-15 NOTE — PLAN OF CARE
"Music Therapy Group note    Total time in session: 25-30 minutes    Number of patients in group: 5    Scope of service: Humanistic     Patient progress: good; less impulsive, though still restless    Patient response/reaction to treatment intervention(s): Alicia chose the song \"I miss you\" again and expressed missing the baby she lost again.  Her affect was more upbeat today.  She still bounced her leg and was in and out of group.    Other details: May be a good candidate for 1:1 Music Therapy to address deeper issues that are surfacing.  (She also requested a song about drug addiction that could be therapeutic with containment and discussion in an individualized setting, but would not be appropriate for group.)    Angelia Wilburn, MT-BC  Board-Certified Music Therapist           "

## 2021-02-15 NOTE — PROGRESS NOTES
"[facility], [place]   Psychiatric Progress Note  Hospital Day: 28        Interim History:   The patient's care was discussed with the treatment team during the daily team meeting and/or staff's chart notes were reviewed.   Staff report: Good, Uneventful weekend- visible socializing with staff and peers in milieu. Attending music therapy. Spends hours pacing. Asked staff about stopping olanzapine over the weekend.     Slept:  6.25 - 7.25hrs over weekend  PRNs: diphenhydramine x 2 on sat and sun for akathisia.   Scheduled meds: took as prescribed, except morning propranolol on 2/15/21 due to low BP.    Interview: Alicia is interviewed in her room. In contrast to notable irritability of past interviews, today she seems distracted and less irritable. She says she is fine. Asks if she can stop olanzapine. When asked about alternatives says, \"I was on Abilify\" and trails off. Says that Abilify made her \"dry chavez.\" Says \"I want to only be on lamotrigine and nothing else.\" Team discusses this may be a goal for the long term but an antipsychotic is indicated now. She says restlessness is increased. Benadryl helps somewhat. Didn't take propranolol this morning because blood pressure was low. She isn't sure if propranolol is helping. Agrees to try benztropine for akathisia. She isn't sure if she's tried it before.     No other questions or concerns.     Suicidal ideation: denies current or recent suicidal ideation or behaviors.  Homicidal ideation: denies current or recent homicidal ideation or behaviors.      Mother Michi Rangel called at 2:45pm: no answer.            Medications:       fluticasone  1 puff Inhalation Daily     lamoTRIgine  50 mg Oral Daily     levonorgestrel-ethinyl estradiol  1 tablet Oral Daily     OLANZapine zydis  15 mg Oral BID    Or     OLANZapine  10 mg Intramuscular BID     propranolol  20 mg Oral TID          Allergies:     Allergies   Allergen Reactions     Nkda [No " "Known Drug Allergies]           Labs:     No results found for this or any previous visit (from the past 48 hour(s)).       Psychiatric Examination:     /50 (BP Location: Left arm)   Pulse 59   Temp 97.8  F (36.6  C) (Tympanic)   Resp 16   Wt 63.5 kg (140 lb)   SpO2 97%   BMI 21.93 kg/m    Weight is 140 lbs 0 oz  Body mass index is 21.93 kg/m .    Appearance: awake, alert, adequately groomed.  Initially appears drowsy, later alert.   Eye Contact: fair  Mood:  \"fine\"  Affect:  Irritable, labile, intense, hostile, sarcastic at times.   Speech:  clear, coherent and normal prosody, rapid, yelling at times  Language: fluent and intact in English  Psychomotor, Gait, Musculoskeletal:  no evidence of tardive dyskinesia, dystonia, or ticsVisibly restless and fidgeting, moving positions frequently.   Thought Process: organized  Associations:  no loose associations  Thought Content:  No evidence of SI/HI/paranoia, no overt delusions expressed today.  Insight:  limited   Judgement:  limited  Oriented to:  time, person, and place  Attention Span and Concentration:  fair  Recent and Remote Memory:  improving, still limited around events leading to hospitalization   Fund of Knowledge:  appropriate    Clinical Global Impressions  First:  Considering your total clinical experience with this particular patient population, how severe are the patient's symptoms at this time?: 6 (01/21/21 1437)  Compared to the patient's condition at the START of treatment, this patient's condition is: 3 (01/21/21 1437)  Most recent:  Considering your total clinical experience with this particular patient population, how severe are the patient's symptoms at this time?: 7 (01/29/21 1522)  Compared to the patient's condition at the START of treatment, this patient's condition is: 4 (01/29/21 1522)           Precautions:     Behavioral Orders   Procedures     Assault precautions     Code 1 - Restrict to Unit     Elopement precautions     Fall " precautions     Routine Programming     As clinically indicated     Status 15     Every 15 minutes.          Diagnoses:     Bipolar I Disorder, current episode manic  Amphetamine use disorder, severe  Cannabis use disorder  Akathisia   Foot pain/dry skin         Assessment & Plan:     Assessment and hospital summary:  This patient is a 25 year old woman with history of bipolar one disorder and substance use use (methamphetamine, alcohol, cocaine, heroin, cannabis) who presented to ED via EMS with agitation and aggression in context of methamphetamine and heroin use and likely medication nonadherence. Her symptoms of responding to internal stimuli, aggression, and disorganization are consistent with a manic episode with psychotic features with potential effects of amphetamine intoxication and opioid withdrawal. She has a history of multiple similar presentations in the ED recently but her symptoms have resolved more quickly, suggesting that her symptoms this time may be more due to her bipolar disorder than substance-induced. Alicia has a history of multiple similar presentations at prior hospitalizations. She has done well in these hospitalizations with an antipsychotic and sometimes a mood stabilizer. She has also required commitment and Carrillo for several of these hospitalizations. Therefore, based on her history and current presentation, she would likely benefit from a mood stabilizer and an antipsychotic to target her manic symptoms, agitation, and psychosis.      Alicia continued to have agitation after admission and declined Depakote. Given her imminent risk to herself and others and poor insight into her mental health, a petition for commitment and [facility]illo were sent. As she was declining Depakote and requesting PTA Lamictal, Depakote was stopped and Lamictal initiated. Discussed need for adherence and that Lamictal is not effective in jackie prevention. Alicia began taking Zyprexa 5mg TID two days after  "admission. Gabapentin PRN was started to target opioid withdrawal as she had low-normal blood pressures and this would therefore be preferred over clonidine for symptoms management.      On 1/25, patient postured toward staff in an aggressive manner. She required seclusion and emergency neuroleptic medications. Psychiatric emergency was declared on 1/26, and Zyprexa was increased, and backed up with IM. Propranolol was also added to target anxiety and suspected akathisia. On 1/28 patient was threatening staff and wanting to leave room to \"attack\" staff, was not redirectable, required use of seclusion. She again required seclusion on 2/3 after becoming agitated and threatening to kill staff.  Psychiatric emergency declared 1/26 as patient postured toward staff with the intent to assault them on 1/25.  Pt had another episode of seclusion on 1/28.  Team pursued commitment with ector, which was granted by [facility] starting 2/11/21.    At this time, patient is showing limited improvement on olanzapine daily olanzapine dose of 30-35mg/day. However, she is unwilling to consider any alternative neuroleptic medications or non-neuroleptic mood stabilizers. She is not interested in Depakote or lithium because of weight gain. She becomes extremely agitated if we discuss medication options and abruptly terminates interview. We are considering cross titrating to risperidone as mother reports she did well on risperidone in the past, however at this time Alicia is extremely opposed to a medication change. Patient did mention she prefers Abilify to other antipsychotics, so we will keep this in consideration. As she is beginning to demonstrate some subtle improvements (participated in group for 15min, less aggression and hostility towards staff), we will continue maximum olanzapine dose for now.     She does seem to be experiencing significant restlessness, possibly as a side effect of olanzapine. Per her chart, she tried " benztropine over 5 years ago. Will address akathisia with Prn medication for now as olanzapine is helping with jackie, psychosis and agitation.     Psychiatric treatment/inteventions:    Today's changes  -discontinue Benadryl PRN for EPSE and replace with benztropine 0.5mg BID PRN      Medications:   # Bipolar I Disorder, current manic episode  - Lamictal 50mg daily (initiated 25mg on 1/20 per pt request, and increased to 50mg on 2/5). She understands that this is not first line treatment for jackie.   -- increase to 75mg on 2/19   - Zyprexa 15 mg BID PO or 10mg IM (most recently increased on 2/5). She has Carrillo that includes olanzapine and she may not decline medication.   -continue to discuss recommendation to start non-neuroleptic mood stabilizer outside of lamotrigine that would be first line for jackie, though patient has consistently declined due to concerns about weight gain.     #akathisia  - Start PRN benztropine 0.5mg BID PRN   -Continue propranolol 20 mg TID to target akathisia and anxiety    PRNs  Akathisia  Benztropine    anxiety  -continue hydroxyzine 25-50 mg q4h prn for acute anxiety - first line  - clonidine 0.1mg BID PRN or anxiety - 2nd line for anxiety   - Gabapentin 300mg TID PRN - 3rd line for anxiety     insomnia  -continue melatonin PRN insomnia    Agitation:   -continue Zyprexa 5mg tid prn for severe agitation  -continue Haldol 5, + 50 Benadryl PRN severe agitation/aggression  - lorazepam PRN discontinued 2/11     Laboratory/Imaging: Ordered repeat COVID per protocol given pt has been hospitalized >1 week      Patient will be treated in therapeutic milieu with appropriate individual and group therapies as described.     Medical treatment/interventions:  Medical concerns:   #headache  -acetaminophen 650mg q4h PRN     # UTI, resolved:  -pt denying any further urinary symptoms   - completed treatment with Bactrim      # Migraines:  -continue PTA sumatriptan PRN     #Opioid Withdrawal, appears  resolved:   -continue Gabapentin 300mg TID PRN opioid withdrawal symptoms  -continue PRN clonidine 0.1mg BID, Imodium, Zofran, Tylenol  -Discontinued COWS 1/22 given absence of significant withdrawal    #Dry Feet  -ordered PRN Eucerin cream, continue to monitor       Disposition Plan   Reason for ongoing admission: poses an imminent risk to self, poses an imminent risk to others and is unable to care for self due to severe psychosis or jackie  Discharge location: pursuing CARE given pts poor insight/judgement and severity of symptoms  Discharge Medications: not ordered  Follow-up Appointments: not scheduled  Legal Status: full commitment with [facility]illo for aripiprazole, risperidone, olanzapine, clozapine, haloperidol.     Video-Visit Details      Video-Visit Details    Type of service:  Video Visit    Video Start Time (time video started): 11:47am  Video End Time (time video stopped): 11:53am  Originating Location (pt. Location): Other station 12  Distant Location (provider location): Provider remote location  Mode of Communication:  Video Conference via [employer]  Physician has received verbal consent for a Video Visit from the patient? Yes    Jyothi Berger MD                This patient was seen and discussed with the attending physician.    Jyothi Berger MD     PGY-2 Psychiatry    I was present with the a student who participated in the service and the documentation of the note.  I have verified the history and personally performed the mental status exam and medical decision making.  I agree with the assessment and plan of care as documented in the note.    Willy Yi MD  [facility]

## 2021-02-15 NOTE — PLAN OF CARE
Pt in bed sleeping at start of shift. Breathing quiet and unlabored. Appeared to be sleeping for 6.75 hours during the night.     On ASSAULT, ELOPEMENT, and SEXUAL precautions in addition to Single Room order, with no related events occurring this shift.     Will continue to monitor and assess.     Problem: Sleep Disturbance (Disruptive Behavior)  Goal: Improved Sleep (Disruptive Behavior)  Outcome: No Change

## 2021-02-15 NOTE — PLAN OF CARE
Pt had a pleasant evening. She is visible in the milieu socializing with staff and peers. She spent hours pacing back and forth the hallway. She is alert and oriented. She did not seem to be responding to internal stimuli. Her insight into her current mental health remains poor. She complained of restless legs, and PRN Benadryl was given. She remains medication compliant and denied SE s. Vital signs stable. She denied pain. She denied SI, SIB.

## 2021-02-15 NOTE — PLAN OF CARE
"Pt has been restless on the unit today, but this appears to be improving. Reports that her mood is \"anxious,\" and contributes this to hospitalization and waiting for discharge. Denies SI/SIB and HI. Pt maintained behavioral control and did not have any agitation or threatening behavior. She was more appropriate in her interactions with peers. She has been compliant with scheduled medications. Morning dose of propranolol held due to BP parameters (BP was 106/50), and she reported understanding. Hygiene is somewhat untidy. She needs prompts to discard garbage and tidy her room. Appetite and fluid intake good. Will continue to monitor closely and encourage participation in therapeutic programming.  "

## 2021-02-15 NOTE — PLAN OF CARE
Problem: OT General Care Plan  Goal: OT Goal 1  Description: Due to limited understanding regarding reasons for current admission, pt will attend OT group to gain self awareness and insight into reasons for hospitalization, and will begin to identify areas where unstable mental health has affected life management skills.    Pt attended portions of the morning groups, easily distracted, often hungry and asking for food, and/or would leave group to walk the mitchell or lay down in her room.  During check-in, pt did report she is feeling better overall, and feels the medications are one of the main reasons why.    Participated in [employer] leisure group in the afternoon.  Remained distracted, though did participate in 2 full rounds.      Outcome: Improving

## 2021-02-16 PROCEDURE — 250N000013 HC RX MED GY IP 250 OP 250 PS 637: Performed by: PSYCHIATRY & NEUROLOGY

## 2021-02-16 PROCEDURE — 124N000002 HC R&B MH UMMC

## 2021-02-16 PROCEDURE — 250N000013 HC RX MED GY IP 250 OP 250 PS 637: Performed by: STUDENT IN AN ORGANIZED HEALTH CARE EDUCATION/TRAINING PROGRAM

## 2021-02-16 PROCEDURE — 99231 SBSQ HOSP IP/OBS SF/LOW 25: CPT | Mod: 95 | Performed by: PSYCHIATRY & NEUROLOGY

## 2021-02-16 RX ADMIN — OLANZAPINE 15 MG: 10 TABLET, ORALLY DISINTEGRATING ORAL at 08:51

## 2021-02-16 RX ADMIN — PROPRANOLOL HYDROCHLORIDE 20 MG: 20 TABLET ORAL at 18:53

## 2021-02-16 RX ADMIN — NICOTINE POLACRILEX 4 MG: 2 GUM, CHEWING ORAL at 17:18

## 2021-02-16 RX ADMIN — PROPRANOLOL HYDROCHLORIDE 20 MG: 20 TABLET ORAL at 12:22

## 2021-02-16 RX ADMIN — LAMOTRIGINE 50 MG: 25 TABLET ORAL at 08:51

## 2021-02-16 RX ADMIN — NICOTINE POLACRILEX 4 MG: 2 GUM, CHEWING ORAL at 13:18

## 2021-02-16 RX ADMIN — NICOTINE POLACRILEX 4 MG: 2 GUM, CHEWING ORAL at 11:34

## 2021-02-16 RX ADMIN — BENZTROPINE MESYLATE 0.5 MG: 0.5 TABLET ORAL at 19:37

## 2021-02-16 RX ADMIN — NICOTINE POLACRILEX 4 MG: 2 GUM, CHEWING ORAL at 12:44

## 2021-02-16 RX ADMIN — FLUTICASONE FUROATE 1 PUFF: 100 POWDER RESPIRATORY (INHALATION) at 08:52

## 2021-02-16 RX ADMIN — OLANZAPINE 15 MG: 10 TABLET, ORALLY DISINTEGRATING ORAL at 18:56

## 2021-02-16 RX ADMIN — NICOTINE POLACRILEX 4 MG: 2 GUM, CHEWING ORAL at 18:20

## 2021-02-16 RX ADMIN — NICOTINE POLACRILEX 4 MG: 2 GUM, CHEWING ORAL at 14:31

## 2021-02-16 RX ADMIN — NICOTINE POLACRILEX 4 MG: 2 GUM, CHEWING ORAL at 16:01

## 2021-02-16 ASSESSMENT — ACTIVITIES OF DAILY LIVING (ADL)
LAUNDRY: UNABLE TO COMPLETE
ORAL_HYGIENE: INDEPENDENT
HYGIENE/GROOMING: HANDWASHING;INDEPENDENT
HYGIENE/GROOMING: INDEPENDENT
ORAL_HYGIENE: INDEPENDENT
LAUNDRY: UNABLE TO COMPLETE
DRESS: SCRUBS (BEHAVIORAL HEALTH)
DRESS: SCRUBS (BEHAVIORAL HEALTH)

## 2021-02-16 NOTE — PLAN OF CARE
Work Completed: Team Meeting    Chart review: Continues to be delusional/labile.  Refused am labs, took meds and went back to sleep.  Team is scheduling Cogentin.  No further progress noted on this case today.      Discharge plan or goal: This patient has been referred and is awaiting placement at a CARE facility.  Discharge date TBD pending availability                Barriers to discharge: Safe placement, medication management, sx stabilization

## 2021-02-16 NOTE — PLAN OF CARE
Problem: Sleep Disturbance (Disruptive Behavior)  Goal: Improved Sleep (Disruptive Behavior)  Outcome: Improving     Pt slept through the night. No behavioral concerns noted. No PRN administered.   Pt slept for 6.75 hours

## 2021-02-16 NOTE — PLAN OF CARE
"Pt was somewhat irritable upon waking this morning, declining VS because she wanted to sleep. Appears restless at times. Encouraged her to allow VS assessment three times a day to allow for administration of scheduled propranolol. Pt verbalizes understanding that she has prn Cogentin available for EPSE. Compliant with other medications. She participated in some groups today. Reports that her mood is \"anxious,\" and is hoping to discharge soon.   "

## 2021-02-16 NOTE — PLAN OF CARE
Problem: Mood Impairment (Disruptive Behavior)  Goal: Improved Mood Symptoms (Disruptive Behavior)  Outcome: Improving  Pt had a pleasant evening. She is alert and oriented but continues to have poor insight into her current mental health. She is visible in the milieu socializing with staff and peers. She paced back and forth the hallway several times.  No manic, restlessness, or disorganized behaviors observed. She endorsed feeling bored and was hoping discharge will happen soon. Bright affect this evening. She remains compliant with her scheduled medication and denies SE s. Vital signs stable. She denied pain or another discomfort. She denied SI, SIB.

## 2021-02-16 NOTE — PROGRESS NOTES
"[facility], [place], [place]   Psychiatric Progress Note  Hospital Day: 29        Interim History:   The patient's care was discussed with the treatment team during the daily team meeting and/or staff's chart notes were reviewed.   Staff report: calm evening, denies need for ongoing hospitalization.  Adherent to medications. No side effects. Declined vitals this AM-     Slept:  6.75 hours  PRNs: nicorette gum 4mg x8   Scheduled meds: took as prescribed- propranolol held 2/16 AM    Interview: Patient declined to meet with team while she was eating lunch. Writer met again with patient after lunch. She immediately inquired about timeline for CARE. She said \"We need to switch gears here and I can go to [facility]. I don't need CARE.\" I asked her whether or not she was able to maintain sobriety while in less restrictive settings. She abruptly became agitated, and said \"You are fucking annoying and worthless!\" She then terminated interview and walked out of her room.     No other questions or concerns.          Medications:       fluticasone  1 puff Inhalation Daily     lamoTRIgine  50 mg Oral Daily     levonorgestrel-ethinyl estradiol  1 tablet Oral Daily     OLANZapine zydis  15 mg Oral BID    Or     OLANZapine  10 mg Intramuscular BID     propranolol  20 mg Oral TID          Allergies:     Allergies   Allergen Reactions     Nkda [No Known Drug Allergies]           Labs:     No results found for this or any previous visit (from the past 48 hour(s)).       Psychiatric Examination:     /80   Pulse 103   Temp 98.1  F (36.7  C)   Resp 16   Wt 63.5 kg (140 lb)   SpO2 97%   BMI 21.93 kg/m    Weight is 140 lbs 0 oz  Body mass index is 21.93 kg/m .    Appearance: awake, alert, adequately groomed.  Initially appears drowsy, later alert.   Eye Contact: fair  Mood:  \"fine\"  Affect:  Irritable, labile, intense, hostile, sarcastic at times.   Speech:  clear, coherent and normal prosody, rapid, yelling " at times  Language: fluent and intact in English  Psychomotor, Gait, Musculoskeletal:  no evidence of tardive dyskinesia, dystonia, or ticsVisibly restless and fidgeting, moving positions frequently.   Thought Process: organized  Associations:  no loose associations  Thought Content:  No evidence of SI/HI/paranoia, no overt delusions expressed today.  Insight:  limited   Judgement:  limited  Oriented to:  time, person, and place  Attention Span and Concentration:  fair  Recent and Remote Memory:  improving, still limited around events leading to hospitalization   Fund of Knowledge:  appropriate    Clinical Global Impressions  First:  Considering your total clinical experience with this particular patient population, how severe are the patient's symptoms at this time?: 6 (01/21/21 1437)  Compared to the patient's condition at the START of treatment, this patient's condition is: 3 (01/21/21 1437)  Most recent:  Considering your total clinical experience with this particular patient population, how severe are the patient's symptoms at this time?: 7 (01/29/21 1522)  Compared to the patient's condition at the START of treatment, this patient's condition is: 4 (01/29/21 1522)           Precautions:     Behavioral Orders   Procedures     Assault precautions     Code 1 - Restrict to Unit     Elopement precautions     Fall precautions     Routine Programming     As clinically indicated     Status 15     Every 15 minutes.          Diagnoses:     Bipolar I Disorder, current episode manic  Amphetamine use disorder, severe  Cannabis use disorder  Akathisia   Foot pain/dry skin         Assessment & Plan:     Assessment and hospital summary:  This patient is a 25 year old woman with history of bipolar one disorder and substance use use (methamphetamine, alcohol, cocaine, heroin, cannabis) who presented to ED via EMS with agitation and aggression in context of methamphetamine and heroin use and likely medication nonadherence. Her  "symptoms of responding to internal stimuli, aggression, and disorganization are consistent with a manic episode with psychotic features with potential effects of amphetamine intoxication and opioid withdrawal. She has a history of multiple similar presentations in the ED recently but her symptoms have resolved more quickly, suggesting that her symptoms this time may be more due to her bipolar disorder than substance-induced. Alicia has a history of multiple similar presentations at prior hospitalizations. She has done well in these hospitalizations with an antipsychotic and sometimes a mood stabilizer. She has also required commitment and [facility] for several of these hospitalizations. Therefore, based on her history and current presentation, she would likely benefit from a mood stabilizer and an antipsychotic to target her manic symptoms, agitation, and psychosis.      Alicia continued to have agitation after admission and declined Depakote. Given her imminent risk to herself and others and poor insight into her mental health, a petition for commitment and Carrillo were sent. As she was declining Depakote and requesting PTA Lamictal, Depakote was stopped and Lamictal initiated. Discussed need for adherence and that Lamictal is not effective in jackie prevention. Alicia began taking Zyprexa 5mg TID two days after admission. Gabapentin PRN was started to target opioid withdrawal as she had low-normal blood pressures and this would therefore be preferred over clonidine for symptoms management.      On 1/25, patient postured toward staff in an aggressive manner. She required seclusion and emergency neuroleptic medications. Psychiatric emergency was declared on 1/26, and Zyprexa was increased, and backed up with IM. Propranolol was also added to target anxiety and suspected akathisia. On 1/28 patient was threatening staff and wanting to leave room to \"attack\" staff, was not redirectable, required use of seclusion. She " again required seclusion on 2/3 after becoming agitated and threatening to kill staff.  Psychiatric emergency declared 1/26 as patient postured toward staff with the intent to assault them on 1/25.  Pt had another episode of seclusion on 1/28.  Team pursued commitment with ector, which was granted by [facility] starting 2/11/21.    At this time, patient is showing limited improvement on olanzapine daily olanzapine dose of 30-35mg/day. However, she is unwilling to consider any alternative neuroleptic medications or non-neuroleptic mood stabilizers. She is not interested in Depakote or lithium because of weight gain. She becomes extremely agitated if we discuss medication options and abruptly terminates interview. We are considering cross titrating to risperidone as mother reports she did well on risperidone in the past, however at this time Alicia is extremely opposed to a medication change. Patient did mention she prefers Abilify to other antipsychotics, so we will keep this in consideration. As she is beginning to demonstrate some subtle improvements (participated in group for 15min, less aggression and hostility towards staff), we will continue maximum olanzapine dose for now.     She does seem to be experiencing significant restlessness, possibly as a side effect of olanzapine. Per her chart, she tried benztropine over 5 years ago. Will address akathisia with Prn medication for now as olanzapine is helping with jackie, psychosis and agitation.     Psychiatric treatment/inteventions:    Today's changes  -None      Medications:   # Bipolar I Disorder, current manic episode  - Lamictal 50mg daily (initiated 25mg on 1/20 per pt request, and increased to 50mg on 2/5). She understands that this is not first line treatment for jackie.   -- increase to 75mg on 2/19   - Zyprexa 15 mg BID PO or 10mg IM (most recently increased on 2/5). She has Carrillo that includes olanzapine and she may not decline medication.    -continue to discuss recommendation to start non-neuroleptic mood stabilizer outside of lamotrigine that would be first line for jackie, though patient has consistently declined due to concerns about weight gain.     #akathisia  - Start PRN benztropine 0.5mg BID PRN   -Continue propranolol 20 mg TID to target akathisia and anxiety    PRNs  Akathisia  Benztropine    anxiety  -continue hydroxyzine 25-50 mg q4h prn for acute anxiety - first line  - clonidine 0.1mg BID PRN or anxiety - 2nd line for anxiety   - Gabapentin 300mg TID PRN - 3rd line for anxiety     insomnia  -continue melatonin PRN insomnia    Agitation:   -continue Zyprexa 5mg tid prn for severe agitation  -continue Haldol 5, + 50 Benadryl PRN severe agitation/aggression  - lorazepam PRN discontinued 2/11     Laboratory/Imaging: Ordered repeat COVID per protocol given pt has been hospitalized >1 week      Patient will be treated in therapeutic milieu with appropriate individual and group therapies as described.     Medical treatment/interventions:  Medical concerns:   #headache  -acetaminophen 650mg q4h PRN     # UTI, resolved:  -pt denying any further urinary symptoms   - completed treatment with Bactrim      # Migraines:  -continue PTA sumatriptan PRN     #Opioid Withdrawal, appears resolved:   -continue Gabapentin 300mg TID PRN opioid withdrawal symptoms  -continue PRN clonidine 0.1mg BID, Imodium, Zofran, Tylenol  -Discontinued COWS 1/22 given absence of significant withdrawal    #Dry Feet  -ordered PRN Eucerin cream, continue to monitor       Disposition Plan   Reason for ongoing admission: poses an imminent risk to self, poses an imminent risk to others and is unable to care for self due to severe psychosis or jackie  Discharge location: pursuing CARE given pts poor insight/judgement and severity of symptoms  Discharge Medications: not ordered  Follow-up Appointments: not scheduled  Legal Status: full commitment with [facility] for aripiprazole,  risperidone, olanzapine, clozapine, haloperidol.          Margarita Reveles MD  [facility] Psychiatry

## 2021-02-17 PROCEDURE — 124N000002 HC R&B MH UMMC

## 2021-02-17 PROCEDURE — 99232 SBSQ HOSP IP/OBS MODERATE 35: CPT | Mod: GC | Performed by: PSYCHIATRY & NEUROLOGY

## 2021-02-17 PROCEDURE — 250N000013 HC RX MED GY IP 250 OP 250 PS 637: Performed by: PSYCHIATRY & NEUROLOGY

## 2021-02-17 PROCEDURE — 250N000013 HC RX MED GY IP 250 OP 250 PS 637: Performed by: STUDENT IN AN ORGANIZED HEALTH CARE EDUCATION/TRAINING PROGRAM

## 2021-02-17 PROCEDURE — 99207 PR CDG-MDM COMPONENT: MEETS MODERATE - UP CODED: CPT | Performed by: PSYCHIATRY & NEUROLOGY

## 2021-02-17 RX ADMIN — MELATONIN TAB 3 MG 3 MG: 3 TAB at 19:50

## 2021-02-17 RX ADMIN — FLUTICASONE FUROATE 1 PUFF: 100 POWDER RESPIRATORY (INHALATION) at 08:02

## 2021-02-17 RX ADMIN — NICOTINE POLACRILEX 4 MG: 2 GUM, CHEWING ORAL at 13:11

## 2021-02-17 RX ADMIN — NICOTINE POLACRILEX 4 MG: 2 GUM, CHEWING ORAL at 11:24

## 2021-02-17 RX ADMIN — OLANZAPINE 15 MG: 10 TABLET, ORALLY DISINTEGRATING ORAL at 07:53

## 2021-02-17 RX ADMIN — PROPRANOLOL HYDROCHLORIDE 20 MG: 20 TABLET ORAL at 07:54

## 2021-02-17 RX ADMIN — OLANZAPINE 15 MG: 10 TABLET, ORALLY DISINTEGRATING ORAL at 19:24

## 2021-02-17 RX ADMIN — ACETAMINOPHEN 650 MG: 325 TABLET, FILM COATED ORAL at 13:14

## 2021-02-17 RX ADMIN — NICOTINE POLACRILEX 4 MG: 2 GUM, CHEWING ORAL at 10:28

## 2021-02-17 RX ADMIN — NICOTINE POLACRILEX 4 MG: 2 GUM, CHEWING ORAL at 18:34

## 2021-02-17 RX ADMIN — NICOTINE POLACRILEX 4 MG: 2 GUM, CHEWING ORAL at 07:54

## 2021-02-17 RX ADMIN — PROPRANOLOL HYDROCHLORIDE 20 MG: 20 TABLET ORAL at 13:12

## 2021-02-17 RX ADMIN — LAMOTRIGINE 50 MG: 25 TABLET ORAL at 07:53

## 2021-02-17 RX ADMIN — PROPRANOLOL HYDROCHLORIDE 20 MG: 20 TABLET ORAL at 19:24

## 2021-02-17 RX ADMIN — BENZTROPINE MESYLATE 0.5 MG: 0.5 TABLET ORAL at 19:50

## 2021-02-17 ASSESSMENT — ACTIVITIES OF DAILY LIVING (ADL)
HYGIENE/GROOMING: INDEPENDENT
LAUNDRY: UNABLE TO COMPLETE
LAUNDRY: UNABLE TO COMPLETE
HYGIENE/GROOMING: HANDWASHING;SHOWER;INDEPENDENT
ORAL_HYGIENE: INDEPENDENT
DRESS: SCRUBS (BEHAVIORAL HEALTH)
DRESS: SCRUBS (BEHAVIORAL HEALTH)
ORAL_HYGIENE: INDEPENDENT

## 2021-02-17 NOTE — PROGRESS NOTES
Writer spoke with [facility] to see where patient is on the wait. Writer was informed patient is way down on a long list. Writer will continue to call [facility] for  current updates regarding wait list

## 2021-02-17 NOTE — PLAN OF CARE
Problem: Behavior Regulation Impairment (Disruptive Behavior)  Goal: Improved Impulse and Aggression Control (Disruptive Behavior)  Outcome: Improving     Problem: Sleep Disturbance (Disruptive Behavior)  Goal: Improved Sleep (Disruptive Behavior)  Outcome: Improving   Pt slept through the night, with bathroom breaks. No disruptive behaviors noted. NO PRN administered tonight. Pt slept for 6.75 hours.

## 2021-02-17 NOTE — PLAN OF CARE
Problem: Behavioral Health Plan of Care  Goal: Plan of Care Review  Outcome: Improving  Flowsheets (Taken 2/16/2021 1909)  Plan of Care Reviewed With: patient  Patient Agreement with Plan of Care: agrees     Problem: Behavior Regulation Impairment (Disruptive Behavior)  Goal: Improved Impulse and Aggression Control (Disruptive Behavior)  Outcome: Improving  Flowsheets (Taken 2/7/2021 2343 by Alicia Reyes RN)  Mutually Determined Action Steps (Improved Impulse and Aggression Control): verbalizes insight re: need for meds     Patient active and social in the milieu majority of the evening pacing the halls, attending groups, and watching movies. Patient upon approach full range in affect, still demonstrating poor but improved concentration. Patient reports overall feeling improved in mood and accepting of current discharge plan to care facility. Patient stated frustration with length of wait to get into a care facility and identified that she would rather go to an IRTS if it were to shorten her length of stay. With further assessment into patients expectations for successful long term sobriety she did feel that a Care facility would promote better success. Patient denied SI/SIB, AH/VH, anxiety, depression, or thoughts of harming others. Patient independent and accepting of HS medications. Patient still reporting akathisia mainly with leg restlessness which was observed when patient was standing in place. Patient Requested PRN medication with Cogentin being offered and accepted. Patient slightly irritable that she no longer has PRN Benadryl which she reported had been therapeutic for the restlessness. With first dose education on Cogentin she stated understanding. Patient a short time later sleeping with no further complaints. Patient independent with ADL's and independent with showering.

## 2021-02-17 NOTE — PROGRESS NOTES
"[facility], [place]   Psychiatric Progress Note  Hospital Day: 30        Interim History:   The patient's care was discussed with the treatment team during the daily team meeting and/or staff's chart notes were reviewed.   Staff report: pacing, going to groups, inappropriate questions to peers, angry about length of stay for CARE facility, wants an IRTS. Talked with staff and acknowledged she has a higher likelihood of success at a CARE.   Akathisia, leg restlessness.   Update from CARE- not close to top of list.     Adherent to medications. No side effects. Declined vitals this AM-     Slept:  6.75 hours  PRNs: benztropine x1 at 7:40pm  Scheduled meds: took as prescribed- propranolol held 2/16 AM    Interview:   Alicia opens the interview by asking \"why was my Benadryl taken away?\" She says she recalls conversation with Dr. FARRELL on Monday discussing how drowsy she was and plan to try benztropine instead for akathisia. She states the benztropine didn't help and may have made her \"more restless.\" She asks if she can get a room with her own bathroom.     She says, \"look, can we get [facility] or an IRTS rolling?\" when she hears that the CARE facility is offering no specific timeline for a bed. She becomes agitated and says \"Look at what I did in my commitment in 2016. I went to [facility] and an [facility] and I was fine. There is no reason for me to be here. I won' stay here longer than 36 days.I don't need to be in a [facility]! She says, \"I dont want to talk to you! You don't do bullshit for my case!\" and stands up the end the interview.     No other questions or concerns for the team.         Medications:       fluticasone  1 puff Inhalation Daily     lamoTRIgine  50 mg Oral Daily     levonorgestrel-ethinyl estradiol  1 tablet Oral Daily     OLANZapine zydis  15 mg Oral BID    Or     OLANZapine  10 mg Intramuscular BID     propranolol  20 mg Oral TID          Allergies: " "    Allergies   Allergen Reactions     Nkda [No Known Drug Allergies]           Labs:     No results found for this or any previous visit (from the past 48 hour(s)).       Psychiatric Examination:     /78 (BP Location: Right arm)   Pulse 96   Temp 97.3  F (36.3  C) (Tympanic)   Resp 16   Wt 63.5 kg (140 lb)   SpO2 97%   BMI 21.93 kg/m    Weight is 140 lbs 0 oz  Body mass index is 21.93 kg/m .    Appearance: awake, alert, adequately groomed.    Behavior: rolls eyes frequently, stands up and walks aggressively towards door to end interview  Eye Contact: fair  Mood:  \"fine. I dont need to be here\"  Affect:  Irritable, labile, intense, hostile, sarcastic   Speech:  clear, coherent and normal prosody, rapid, yelling and elevated volume at times  Language: fluent and intact in English  Psychomotor, Gait, Musculoskeletal:  no evidence of tardive dyskinesia, dystonia, or ticsMuch less visibly restless compared to yesterday, sitting still without fidgeting during interview.  Thought Process: perseverative on discharge/CARE waitlist  Associations:  no loose associations  Thought Content:  No evidence of SI/HI/paranoia, no overt delusions expressed today.  Insight:  limited   Judgement:  limited  Oriented to:  time, person, and place  Attention Span and Concentration:  fair  Recent and Remote Memory:  improving, still limited around events leading to hospitalization   Fund of Knowledge:  appropriate    Clinical Global Impressions  First:  Considering your total clinical experience with this particular patient population, how severe are the patient's symptoms at this time?: 6 (01/21/21 1437)  Compared to the patient's condition at the START of treatment, this patient's condition is: 3 (01/21/21 1437)  Most recent:  Considering your total clinical experience with this particular patient population, how severe are the patient's symptoms at this time?: 7 (01/29/21 1522)  Compared to the patient's condition at the START " of treatment, this patient's condition is: 4 (01/29/21 1522)           Precautions:     Behavioral Orders   Procedures     Assault precautions     Code 1 - Restrict to Unit     Elopement precautions     Fall precautions     Routine Programming     As clinically indicated     Status 15     Every 15 minutes.          Diagnoses:     Bipolar I Disorder, current episode manic  Amphetamine use disorder, severe  Cannabis use disorder  Akathisia   Foot pain/dry skin         Assessment & Plan:     Assessment and hospital summary:  This patient is a 25 year old woman with history of bipolar one disorder and substance use use (methamphetamine, alcohol, cocaine, heroin, cannabis) who presented to ED via EMS with agitation and aggression in context of methamphetamine and heroin use and likely medication nonadherence. Her symptoms of responding to internal stimuli, aggression, and disorganization are consistent with a manic episode with psychotic features with potential effects of amphetamine intoxication and opioid withdrawal. She has a history of multiple similar presentations in the ED recently but her symptoms have resolved more quickly, suggesting that her symptoms this time may be more due to her bipolar disorder than substance-induced. Alicia has a history of multiple similar presentations at prior hospitalizations. She has done well in these hospitalizations with an antipsychotic and sometimes a mood stabilizer. She has also required commitment and Carrillo for several of these hospitalizations. Therefore, based on her history and current presentation, she would likely benefit from a mood stabilizer and an antipsychotic to target her manic symptoms, agitation, and psychosis.      Alicia continued to have agitation after admission and declined Depakote. Given her imminent risk to herself and others and poor insight into her mental health, a petition for commitment and Carrillo were sent. As she was declining Depakote and  "requesting PTA Lamictal, Depakote was stopped and Lamictal initiated. Discussed need for adherence and that Lamictal is not effective in jackie prevention. Alicia began taking Zyprexa 5mg TID two days after admission. Gabapentin PRN was started to target opioid withdrawal as she had low-normal blood pressures and this would therefore be preferred over clonidine for symptoms management.      On 1/25, patient postured toward staff in an aggressive manner. She required seclusion and emergency neuroleptic medications. Psychiatric emergency was declared on 1/26, and Zyprexa was increased, and backed up with IM. Propranolol was also added to target anxiety and suspected akathisia. On 1/28 patient was threatening staff and wanting to leave room to \"attack\" staff, was not redirectable, required use of seclusion. She again required seclusion on 2/3 after becoming agitated and threatening to kill staff.  Psychiatric emergency declared 1/26 as patient postured toward staff with the intent to assault them on 1/25.  Pt had another episode of seclusion on 1/28.  Team pursued commitment with ector, which was granted by [facility] starting 2/11/21.    At this time, patient is showing limited improvement on olanzapine daily olanzapine dose of 30-35mg/day. However, she is unwilling to consider any alternative neuroleptic medications or non-neuroleptic mood stabilizers. She is not interested in Depakote or lithium because of weight gain. She becomes extremely agitated if we discuss medication options and abruptly terminates interview. We are considering cross titrating to risperidone as mother reports she did well on risperidone in the past, however at this time Alicia is extremely opposed to a medication change. Patient did mention she prefers Abilify to other antipsychotics, so we will keep this in consideration. As she is beginning to demonstrate some subtle improvements (participated in group for 15min, less aggression and " hostility towards staff), we will continue maximum olanzapine dose for now. She continues to be quite labile and agitated in conversations with medical team.    She does seem to be experiencing significant restlessness, possibly as a side effect of olanzapine. It worsened after stopping lorazepam. Per her chart, she tried benztropine over 5 years ago. Will address akathisia with Prn benztropine for now as olanzapine is helping with jackie, psychosis and agitation. After taking benztropine, patient does appear visibly less restless and fidgety, though she reported minimal benefit.     Margarita is very frustrated  By wait for CARE and interested in IRTS. However, discussions with her family and outpatient care team have resulted in decision that CARE is safest place for her given history of impulsivity, vulnerability and poor insight into mental health needs.    Psychiatric treatment/inteventions:    Today's changes  -No medication changes  - discuss possibility of private bathroom with nursing staff    Medications:   # Bipolar I Disorder, current manic episode  - Lamictal 50mg daily (initiated 25mg on 1/20 per pt request, and increased to 50mg on 2/5). She understands that this is not first line treatment for jackie.   -- increase to 75mg on 2/19   - Zyprexa 15 mg BID PO or 10mg IM (most recently increased on 2/5). She has Carrillo that includes olanzapine and she may not decline medication.   -continue to discuss recommendation to start non-neuroleptic mood stabilizer outside of lamotrigine that would be first line for jackie, though patient has consistently declined due to concerns about weight gain.     #akathisia  - Start PRN benztropine 0.5mg BID PRN   -Continue propranolol 20 mg TID to target akathisia and anxiety  - diphenhydramine stopped due to excessive drowsiness     #anxiety  -continue hydroxyzine 25-50 mg q4h prn for acute anxiety - first line  - clonidine 0.1mg BID PRN or anxiety - 2nd line for anxiety   -  Gabapentin 300mg TID PRN - 3rd line for anxiety     #insomnia  -continue melatonin PRN     Agitation:   -continue Zyprexa 5mg tid prn for severe agitation (first line) OR Haldol 5, + 50 Benadryl PRN severe agitation/aggression  - lorazepam PRN discontinued 2/11     Laboratory/Imaging: Ordered repeat COVID per protocol given pt has been hospitalized >1 week      Patient will be treated in therapeutic milieu with appropriate individual and group therapies as described.     Medical treatment/interventions:  Medical concerns:   #headache  -acetaminophen 650mg q4h PRN     # UTI, resolved:  -pt denying any further urinary symptoms   - completed treatment with Bactrim      # Migraines:  -continue PTA sumatriptan PRN     #Opioid Withdrawal, appears resolved:   -continue Gabapentin 300mg TID PRN opioid withdrawal symptoms  -continue PRN clonidine 0.1mg BID, Imodium, Zofran, Tylenol  -Discontinued COWS 1/22 given absence of significant withdrawal    #Dry Feet  -ordered PRN Eucerin cream, continue to monitor       Disposition Plan   Reason for ongoing admission: poses an imminent risk to self, poses an imminent risk to others and is unable to care for self due to severe psychosis or jackie  Discharge location: pursuing CARE given pts poor insight/judgement and severity of symptoms  Discharge Medications: not ordered  Follow-up Appointments: not scheduled  Legal Status: full commitment with [facility] for aripiprazole, risperidone, olanzapine, clozapine, haloperidol.            This patient was seen and discussed with the attending physician.    Jyothi Berger MD   PGY-2 Psychiatry

## 2021-02-17 NOTE — PLAN OF CARE
Pt presents as restless and distractable. Mood and affect anxious. She had a period of agitation and verbal aggression while meeting with Dr Reveles. Verbalized anger about long wait for CARE facility, and was yelling/using profanity/insulting the treatment team. Following this, she requested to shower as a coping skill. Continues to demonstrate poor insight into her mental health and chemical dependency, and does not believe that she requires treatment or further care. She was cooperative with VS assessment today, and compliant with scheduled medications. Reports akathisia throughout the day, and she was reminded about prn Cogentin availability. Appetite and fluid intake good. She is attending select groups.

## 2021-02-18 PROCEDURE — G0177 OPPS/PHP; TRAIN & EDUC SERV: HCPCS

## 2021-02-18 PROCEDURE — 250N000013 HC RX MED GY IP 250 OP 250 PS 637: Performed by: STUDENT IN AN ORGANIZED HEALTH CARE EDUCATION/TRAINING PROGRAM

## 2021-02-18 PROCEDURE — 250N000013 HC RX MED GY IP 250 OP 250 PS 637: Performed by: PSYCHIATRY & NEUROLOGY

## 2021-02-18 PROCEDURE — 99232 SBSQ HOSP IP/OBS MODERATE 35: CPT | Mod: GC | Performed by: PSYCHIATRY & NEUROLOGY

## 2021-02-18 PROCEDURE — 90853 GROUP PSYCHOTHERAPY: CPT

## 2021-02-18 PROCEDURE — 99207 PR CDG-MDM COMPONENT: MEETS MODERATE - UP CODED: CPT | Performed by: PSYCHIATRY & NEUROLOGY

## 2021-02-18 PROCEDURE — 124N000002 HC R&B MH UMMC

## 2021-02-18 RX ADMIN — LAMOTRIGINE 50 MG: 25 TABLET ORAL at 09:59

## 2021-02-18 RX ADMIN — BENZTROPINE MESYLATE 0.5 MG: 0.5 TABLET ORAL at 19:34

## 2021-02-18 RX ADMIN — NICOTINE POLACRILEX 2 MG: 2 GUM, CHEWING ORAL at 16:55

## 2021-02-18 RX ADMIN — NICOTINE POLACRILEX 4 MG: 2 GUM, CHEWING ORAL at 15:24

## 2021-02-18 RX ADMIN — BENZTROPINE MESYLATE 0.5 MG: 0.5 TABLET ORAL at 12:29

## 2021-02-18 RX ADMIN — PROPRANOLOL HYDROCHLORIDE 20 MG: 20 TABLET ORAL at 13:51

## 2021-02-18 RX ADMIN — PROPRANOLOL HYDROCHLORIDE 20 MG: 20 TABLET ORAL at 19:01

## 2021-02-18 RX ADMIN — METFORMIN HYDROCHLORIDE 500 MG: 500 TABLET, FILM COATED ORAL at 17:53

## 2021-02-18 RX ADMIN — OLANZAPINE 15 MG: 10 TABLET, ORALLY DISINTEGRATING ORAL at 19:01

## 2021-02-18 RX ADMIN — NICOTINE POLACRILEX 2 MG: 2 GUM, CHEWING ORAL at 17:55

## 2021-02-18 RX ADMIN — FLUTICASONE FUROATE 1 PUFF: 100 POWDER RESPIRATORY (INHALATION) at 10:00

## 2021-02-18 RX ADMIN — NICOTINE POLACRILEX 4 MG: 2 GUM, CHEWING ORAL at 09:43

## 2021-02-18 RX ADMIN — OLANZAPINE 15 MG: 10 TABLET, ORALLY DISINTEGRATING ORAL at 10:00

## 2021-02-18 RX ADMIN — NICOTINE POLACRILEX 4 MG: 2 GUM, CHEWING ORAL at 11:46

## 2021-02-18 RX ADMIN — NICOTINE POLACRILEX 4 MG: 2 GUM, CHEWING ORAL at 13:51

## 2021-02-18 RX ADMIN — NICOTINE POLACRILEX 4 MG: 2 GUM, CHEWING ORAL at 19:01

## 2021-02-18 RX ADMIN — PROPRANOLOL HYDROCHLORIDE 20 MG: 20 TABLET ORAL at 09:59

## 2021-02-18 RX ADMIN — LEVONORGESTREL AND ETHINYL ESTRADIOL 1 TABLET: KIT at 10:00

## 2021-02-18 ASSESSMENT — ACTIVITIES OF DAILY LIVING (ADL)
ORAL_HYGIENE: INDEPENDENT
DRESS: SCRUBS (BEHAVIORAL HEALTH)
ORAL_HYGIENE: INDEPENDENT
HYGIENE/GROOMING: INDEPENDENT
LAUNDRY: WITH SUPERVISION
DRESS: INDEPENDENT
HYGIENE/GROOMING: INDEPENDENT

## 2021-02-18 NOTE — PROGRESS NOTES
Night Shift Summary (2/17/21 into 02/18/21)    Pt in bed sleeping at start of shift. Breathing quiet and unlabored. Appeared to be sleeping for 6.5 hours during the night.     On FALL, ASSAULT, and ELOPEMENT  precautions in addition to Single Room order, with no related events occurring this shift.     Will continue to monitor and assess.

## 2021-02-18 NOTE — PROGRESS NOTES
"[facility], [place]   Psychiatric Progress Note  Hospital Day: 31        Interim History:   The patient's care was discussed with the treatment team during the daily team meeting and/or staff's chart notes were reviewed.  Staff report patient did well, medication compliant, attending groups. Verbalized anger about long wait for CARE facility, and was yelling/using profanity/insulting the treatment team. Following this, she requested to shower as a coping skill. Reporting akathisia throughout the day.      Sleep: 6.5 hrs  PRNs: Tylenol x 1, benztropine 0.5mg x 1, melatonin, nicorette.  Scheduled meds: taking as prescribed     Upon interview, the patient asks: \"Any updates about CARE?\" Team reassures her she will be the first to hear about any news. She asks to go to a  less restrictive unit. She asks, \"How long will I be here?\" she explained that during her last commitment in 2016, she stayed at [facility] for 36 days. She doesn't want to stay longer than 36 days this time, and say \"we're already there.\"      Discussed transfer to less restrictive unit after weekend, pending demonstration of keeping self and others safe, continued regulation of emotoins.      Open to meeting with a therapist one on one.      She appears restless during interview and says the \"sestlessness is ongoing.\" She thinks the cause is \"the unknown.\" Denies feeling discomfort, and says \"I just feel like I need to move.\"     Discussed starting metformin for weight gain. Alicia had no concerns about starting this medication. She says her appetite has been less elevated recently. Counseled on risks and benefits including potential side effect of nausea/abdominal cramps/diarrhea.      No expressed SI/HI or delusions or other psychotic symptoms.     No other questions or concerns for the team.         Medications:       fluticasone  1 puff Inhalation Daily     lamoTRIgine  50 mg Oral Daily     levonorgestrel-ethinyl " "estradiol  1 tablet Oral Daily     OLANZapine zydis  15 mg Oral BID    Or     OLANZapine  10 mg Intramuscular BID     propranolol  20 mg Oral TID          Allergies:     Allergies   Allergen Reactions     Nkda [No Known Drug Allergies]           Labs:     No results found for this or any previous visit (from the past 48 hour(s)).       Psychiatric Examination:     BP 99/66   Pulse 100   Temp 97.8  F (36.6  C)   Resp 16   Wt 64.2 kg (141 lb 9.6 oz)   SpO2 97%   BMI 22.18 kg/m    Weight is 141 lbs 9.6 oz  Body mass index is 22.18 kg/m .    Appearance: awake, alert, adequately groomed.    Behavior: appears restless but much calmer compared to previous interviews.  Eye Contact: fair  Mood:  \"fine\"  Affect:  Anxious, annoyed, tense, but less intense than before  Speech:  clear, coherent and normal prosody,   Language: fluent and intact in English  Psychomotor, Gait, Musculoskeletal:   visibly restless, fidgeting during interview.  Thought Process: perseverative on discharge/CARE waitlist  Associations:  no loose associations  Thought Content:  No evidence of SI/HI/paranoia, no overt delusions expressed today.  Insight:  limited   Judgement:  limited  Oriented to:  time, person, and place  Attention Span and Concentration:  fair  Recent and Remote Memory:  improving, still limited around events leading to hospitalization   Fund of Knowledge:  appropriate    Clinical Global Impressions  First:  Considering your total clinical experience with this particular patient population, how severe are the patient's symptoms at this time?: 6 (01/21/21 1437)  Compared to the patient's condition at the START of treatment, this patient's condition is: 3 (01/21/21 1437)  Most recent:  Considering your total clinical experience with this particular patient population, how severe are the patient's symptoms at this time?: 7 (01/29/21 1522)  Compared to the patient's condition at the START of treatment, this patient's condition is: 4 " (01/29/21 1522)           Precautions:     Behavioral Orders   Procedures     Assault precautions     Code 1 - Restrict to Unit     Elopement precautions     Fall precautions     Routine Programming     As clinically indicated     Status 15     Every 15 minutes.          Diagnoses:     Bipolar I Disorder, current episode manic  Amphetamine use disorder, severe  Cannabis use disorder  Akathisia   Foot pain/dry skin         Assessment & Plan:     Assessment and hospital summary:  This patient is a 25 year old woman with history of bipolar one disorder and substance use use (methamphetamine, alcohol, cocaine, heroin, cannabis) who presented to ED via EMS with agitation and aggression in context of methamphetamine and heroin use and likely medication nonadherence. Her symptoms of responding to internal stimuli, aggression, and disorganization are consistent with a manic episode with psychotic features with potential effects of amphetamine intoxication and opioid withdrawal. She has a history of multiple similar presentations in the ED recently but her symptoms have resolved more quickly, suggesting that her symptoms this time may be more due to her bipolar disorder than substance-induced. Alicia has a history of multiple similar presentations at prior hospitalizations. She has done well in these hospitalizations with an antipsychotic and sometimes a mood stabilizer. She has also required commitment and Carrillo for several of these hospitalizations. Therefore, based on her history and current presentation, she would likely benefit from a mood stabilizer and an antipsychotic to target her manic symptoms, agitation, and psychosis.      Alicia continued to have agitation after admission and declined Depakote. Given her imminent risk to herself and others and poor insight into her mental health, a petition for commitment and Carrillo were sent. As she was declining Depakote and requesting PTA Lamictal, Depakote was stopped  "and Lamictal initiated. Discussed need for adherence and that Lamictal is not effective in jackie prevention. Alicia began taking Zyprexa 5mg TID two days after admission. Gabapentin PRN was started to target opioid withdrawal as she had low-normal blood pressures and this would therefore be preferred over clonidine for symptoms management.      On 1/25, patient postured toward staff in an aggressive manner. She required seclusion and emergency neuroleptic medications. Psychiatric emergency was declared on 1/26, and Zyprexa was increased, and backed up with IM. Propranolol was also added to target anxiety and suspected akathisia. On 1/28 patient was threatening staff and wanting to leave room to \"attack\" staff, was not redirectable, required use of seclusion. She again required seclusion on 2/3 after becoming agitated and threatening to kill staff.  Psychiatric emergency declared 1/26 as patient postured toward staff with the intent to assault them on 1/25.  Pt had another episode of seclusion on 1/28.  Team pursued commitment with ector, which was granted by [facility] starting 2/11/21.    At this time, patient is showing limited improvement on olanzapine daily olanzapine dose of 30-35mg/day. However, she is unwilling to consider any alternative neuroleptic medications or non-neuroleptic mood stabilizers. She is not interested in Depakote or lithium because of weight gain. She becomes extremely agitated if we discuss medication options and abruptly terminates interview. We are considering cross titrating to risperidone as mother reports she did well on risperidone in the past, however at this time Alicia is extremely opposed to a medication change. Patient did mention she prefers Abilify to other antipsychotics, so we will keep this in consideration. As she is beginning to demonstrate some subtle improvements (participated in group for 15min, less aggression and hostility towards staff), we will continue " maximum olanzapine dose for now. She continues to be quite labile and agitated in conversations with medical team.    She does seem to be experiencing significant restlessness, possibly as a side effect of olanzapine. It worsened after stopping lorazepam. Per her chart, she tried benztropine over 5 years ago. Will address akathisia with Prn benztropine for now as olanzapine is helping with jackie, psychosis and agitation. After taking benztropine, patient does appear visibly less restless and fidgety, though she reported minimal benefit.     Margarita is very frustrated  By wait for CARE and interested in IRTS. However, discussions with her family and outpatient care team have resulted in decision that CARE is safest place for her given history of impulsivity, vulnerability and poor insight into mental health needs.    Psychiatric treatment/inteventions:    Today's changes  - start metformin 500mg BID for weight management secondary to olanzapine     Medications:   # Bipolar I Disorder, current manic episode  - Lamictal 50mg daily (initiated 25mg on 1/20 per pt request, and increased to 50mg on 2/5). She understands that this is not first line treatment for jackie.   -- increase to 75mg on 2/19   - Zyprexa 15 mg BID PO or 10mg IM (most recently increased on 2/5). She has Carrillo that includes olanzapine and she may not decline medication.   -continue to discuss recommendation to start non-neuroleptic mood stabilizer outside of lamotrigine that would be first line for jackie, though patient has consistently declined due to concerns about weight gain.   - metformin 500mg BID with meals     #akathisia  - Start PRN benztropine 0.5mg BID PRN   -Continue propranolol 20 mg TID to target akathisia and anxiety  - diphenhydramine stopped due to excessive drowsiness     #anxiety  -continue hydroxyzine 25-50 mg q4h prn for acute anxiety - first line  - clonidine 0.1mg BID PRN or anxiety - 2nd line for anxiety   - Gabapentin 300mg TID  PRN - 3rd line for anxiety     #insomnia  -continue melatonin PRN     Agitation:   -continue Zyprexa 5mg tid prn for severe agitation (first line) OR Haldol 5, + 50 Benadryl PRN severe agitation/aggression  - lorazepam PRN discontinued 2/11     Laboratory/Imaging: Ordered repeat COVID per protocol given pt has been hospitalized >1 week      Patient will be treated in therapeutic milieu with appropriate individual and group therapies as described.     Medical treatment/interventions:  Medical concerns:   #headache  -acetaminophen 650mg q4h PRN     # UTI, resolved:  -pt denying any further urinary symptoms   - completed treatment with Bactrim      # Migraines:  -continue PTA sumatriptan PRN     #Opioid Withdrawal, appears resolved:   -continue Gabapentin 300mg TID PRN opioid withdrawal symptoms  -continue PRN clonidine 0.1mg BID, Imodium, Zofran, Tylenol  -Discontinued COWS 1/22 given absence of significant withdrawal    #Dry Feet  -ordered PRN Eucerin cream, continue to monitor       Disposition Plan   Reason for ongoing admission: poses an imminent risk to self, poses an imminent risk to others and is unable to care for self due to severe psychosis or jackie  Discharge location: pursuing CARE given pts poor insight/judgement and severity of symptoms  Discharge Medications: not ordered  Follow-up Appointments: not scheduled  Legal Status: full commitment with Carrillo for aripiprazole, risperidone, olanzapine, clozapine, haloperidol.          This patient was seen and discussed with the attending physician.    Jyothi Berger MD   PGY-2 Psychiatry

## 2021-02-18 NOTE — PLAN OF CARE
Work Completed: Attended Team: Reviewed chart notes: Met with patienbt to discuss disposition plan and CARE Program status.    Discharge plan or goal:  CARE Program                Barriers to discharge:  Long wait list at multiple CARE Programs. Patient is continuing to stabilize.

## 2021-02-18 NOTE — PLAN OF CARE
"Nursing Assessment    Recent Vitals: B/P: 0800 99/66, T: 97.8, P: 100, 1340 B/P: 106/70, T: 97.8, P: 86, R: 16    Sleep:  Hours of sleep at night: 6.5  Barriers: None    Goal Denies having a goal.    General Shift Summary  Patient has been visible in milieu, social with select peers, is mainly seen pacing the halls, and participated in groups. She presents as calm with a blunted affect. Patient denied SI/HI/AVH/SIB, anxiety, and depression. She denied pain. Hygiene is fair. She was compliant with her medications, no voiced side effect. She requested Cogentin for restless legs and \"Feeling jittery\" and received it at 1229. Patient voiced having some relief.    Plan is to continue to monitor patient status q 15 mins, assess response to medications, and maintain the patients safety.    Jane Melendez RN MSN  " MECCA Chaudhry  is a 63 y.o. female who presents for a routine gynecologic exam.  She has no complaints today.  Mammogram was performed today.  Colonoscopy was done more than 10 years ago.    Chief Complaint   Patient presents with   • New Gyn     Patient is here for a new gyn annual.       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 of both eyes with active choroidal neovascularization (CMS/HCC)        Past Surgical History:   Procedure Laterality Date   • KNEE SURGERY         Social History     Socioeconomic History   • Marital status:      Spouse name: Not on file   • Number of children: Not on file   • Years of education: Not on file   • Highest education level: Not on file   Tobacco Use   • Smoking status: Never Smoker   Substance and Sexual Activity   • Alcohol use: Yes     Alcohol/week: 1.2 oz     Types: 2 Glasses of wine per week   • Drug use: No   • Sexual activity: Yes     Partners: Male       The following portions of the patient's history were reviewed and updated as appropriate: allergies, current medications, past family history, past medical history, past social history, past surgical history and problem list.    Review of Systems   Constitutional: Negative.    HENT: Negative.    Respiratory: Negative.    Cardiovascular: Negative.    Gastrointestinal: Negative.    Genitourinary: Negative.    Musculoskeletal: Negative.    Skin: Negative.    Allergic/Immunologic: Negative.    Psychiatric/Behavioral: Negative.              Physical Exam   Constitutional: She is oriented to person, place, and time. She appears well-developed and well-nourished.   HENT:   Head: Normocephalic and atraumatic.   Cardiovascular: Normal rate and regular rhythm.   Pulmonary/Chest: Effort normal and breath sounds normal. She has no wheezes. She has no rales.   The breasts are homogeneous.  There are no palpable  lumps.  Nipple discharge and axillary adenopathy are absent.   Abdominal: Soft. She exhibits no distension. There is no tenderness.   Genitourinary: Vagina normal and uterus normal. There is no lesion on the right labia. There is no lesion on the left labia. Cervix exhibits no motion tenderness. Right adnexum displays no mass and no tenderness. Left adnexum displays no mass and no tenderness. No vaginal discharge found.   Neurological: She is alert and oriented to person, place, and time.   Skin: Skin is warm and dry.   Nursing note and vitals reviewed.      Assessment    Margarita was seen today for new gyn.    Diagnoses and all orders for this visit:    Encounter for gynecological examination without abnormal finding        Plan  1. Normal gynecologic exam.  Pap smear performed  2. Counseled regarding the importance of regular screening mammograms.  Mammogram was performed today.  3. Counseled regarding the importance of regular screening colonoscopies.  Last colonoscopy was performed more than 10 years ago.  I recommend that this be repeated.  The patient is considering.    4. Return in about 1 year (around 5/7/2020).    Social History     Tobacco Use   Smoking Status Never Smoker   5.     6.

## 2021-02-18 NOTE — PROGRESS NOTES
Writer met with patient to discuss the discharge plan of CARE Program.  Writer informed patient that the wait list is long but that patient has been referred to multiple CARE Programs. Patient was appreciative of this information and appeared to understand the process.

## 2021-02-18 NOTE — PLAN OF CARE
Problem: OT General Care Plan  Goal: OT Goal 1  Description: Due to limited understanding regarding reasons for current admission, pt will attend OT group to gain self awareness and insight into reasons for hospitalization, and will begin to identify areas where unstable mental health has affected life management skills.    Pt was in and out of the lounge all morning, able to focus enough to attend the morning OT clinic.  Remains impulsive, loses interest quickly with tasks.  Social with one other peer present, sometimes says random things, though other peer does as well, and they seem to understand each other.  Pt managed well with sometimes intrusive peer.    2/18/2021 1509 by Veronica Del Valle, OT  Outcome: Improving

## 2021-02-18 NOTE — PLAN OF CARE
Problem: Behavioral Health Plan of Care  Goal: Plan of Care Review  Outcome: Improving  Flowsheets  Taken 2/17/2021 2227  Progress: improving  Taken 2/17/2021 2200  Plan of Care Reviewed With: patient  Patient Agreement with Plan of Care: (see note) agrees with comment (describe)     Problem: Behavioral Health Plan of Care  Goal: Develops/Participates in Therapeutic [place] to Support Successful Transition  Outcome: Improving  Intervention: Foster Therapeutic [place]  Recent Flowsheet Documentation  Taken 2/17/2021 2200 by Denis Marlow RN  Trust Relationship/Rapport:   care explained   choices provided   emotional support provided   empathic listening provided   questions answered   questions encouraged   reassurance provided   thoughts/feelings acknowledged     Patient in room majority of the evening sleeping only in the milieu during meals and for medications. Patient upon approach calm and social with check in. Patient reports continued frustration with length of stay and wait list for a CARE facility. Patient with reassurance and education accepting of the plan and receptive of the current plan. Patient did show insight into agitation earlier in the day towards attending provider. With reassurance from RN writer informing her of the care plan and the care teams advocacy for the patients overall successful transition and sobriety. Patient reporting boredom on the unit and discomfort with the unit beds. RN writer discussed potential possibility of patient transferring to less restrictive unit which she expressed interest with. Patient was also offered and accepting of soft care mattress and aided in getting fresh linens for her bed. Patient denies any current SI/SIB, AH/VH, anxiety, depression or thoughts of harming others. Patient had no observed agitation or delusional thought demonstrated this evening. Patient independent and accepting of HS medications. Patient requested PRN Cogentin to target leg  restlessness as well as melatonin to aid sleep. Patient independent with identifying needs and completing most ADL's though her room is very untidy.

## 2021-02-19 PROCEDURE — 99232 SBSQ HOSP IP/OBS MODERATE 35: CPT | Mod: GC | Performed by: PSYCHIATRY & NEUROLOGY

## 2021-02-19 PROCEDURE — 250N000013 HC RX MED GY IP 250 OP 250 PS 637: Performed by: PSYCHIATRY & NEUROLOGY

## 2021-02-19 PROCEDURE — 124N000002 HC R&B MH UMMC

## 2021-02-19 PROCEDURE — 250N000013 HC RX MED GY IP 250 OP 250 PS 637: Performed by: STUDENT IN AN ORGANIZED HEALTH CARE EDUCATION/TRAINING PROGRAM

## 2021-02-19 RX ORDER — LAMOTRIGINE 25 MG/1
75 TABLET ORAL DAILY
Status: DISCONTINUED | OUTPATIENT
Start: 2021-02-20 | End: 2021-03-04

## 2021-02-19 RX ORDER — BENZTROPINE MESYLATE 0.5 MG/1
0.5 TABLET ORAL
Status: DISCONTINUED | OUTPATIENT
Start: 2021-02-19 | End: 2021-02-22

## 2021-02-19 RX ADMIN — OLANZAPINE 15 MG: 10 TABLET, ORALLY DISINTEGRATING ORAL at 10:38

## 2021-02-19 RX ADMIN — PROPRANOLOL HYDROCHLORIDE 20 MG: 20 TABLET ORAL at 13:38

## 2021-02-19 RX ADMIN — LEVONORGESTREL AND ETHINYL ESTRADIOL 1 TABLET: KIT at 10:39

## 2021-02-19 RX ADMIN — NICOTINE POLACRILEX 4 MG: 2 GUM, CHEWING ORAL at 17:59

## 2021-02-19 RX ADMIN — BENZTROPINE MESYLATE 0.5 MG: 0.5 TABLET ORAL at 17:59

## 2021-02-19 RX ADMIN — PROPRANOLOL HYDROCHLORIDE 20 MG: 20 TABLET ORAL at 10:39

## 2021-02-19 RX ADMIN — OLANZAPINE 15 MG: 10 TABLET, ORALLY DISINTEGRATING ORAL at 19:00

## 2021-02-19 RX ADMIN — PROPRANOLOL HYDROCHLORIDE 20 MG: 20 TABLET ORAL at 22:19

## 2021-02-19 RX ADMIN — NICOTINE POLACRILEX 4 MG: 2 GUM, CHEWING ORAL at 12:42

## 2021-02-19 RX ADMIN — FLUTICASONE FUROATE 1 PUFF: 100 POWDER RESPIRATORY (INHALATION) at 10:40

## 2021-02-19 RX ADMIN — NICOTINE POLACRILEX 4 MG: 2 GUM, CHEWING ORAL at 13:38

## 2021-02-19 RX ADMIN — NICOTINE POLACRILEX 4 MG: 2 GUM, CHEWING ORAL at 10:42

## 2021-02-19 RX ADMIN — METFORMIN HYDROCHLORIDE 500 MG: 500 TABLET, FILM COATED ORAL at 18:00

## 2021-02-19 RX ADMIN — NICOTINE POLACRILEX 4 MG: 2 GUM, CHEWING ORAL at 14:41

## 2021-02-19 RX ADMIN — NICOTINE POLACRILEX 4 MG: 2 GUM, CHEWING ORAL at 16:33

## 2021-02-19 RX ADMIN — METFORMIN HYDROCHLORIDE 500 MG: 500 TABLET, FILM COATED ORAL at 10:38

## 2021-02-19 RX ADMIN — NICOTINE POLACRILEX 2 MG: 2 GUM, CHEWING ORAL at 19:01

## 2021-02-19 RX ADMIN — LAMOTRIGINE 50 MG: 25 TABLET ORAL at 10:38

## 2021-02-19 ASSESSMENT — ACTIVITIES OF DAILY LIVING (ADL)
LAUNDRY: UNABLE TO COMPLETE
DRESS: INDEPENDENT;SCRUBS (BEHAVIORAL HEALTH)
HYGIENE/GROOMING: INDEPENDENT
HYGIENE/GROOMING: INDEPENDENT
ORAL_HYGIENE: INDEPENDENT
LAUNDRY: UNABLE TO COMPLETE
ORAL_HYGIENE: INDEPENDENT
DRESS: SCRUBS (BEHAVIORAL HEALTH)

## 2021-02-19 NOTE — PLAN OF CARE
Problem: Behavior Regulation Impairment (Disruptive Behavior)  Goal: Improved Impulse and Aggression Control (Disruptive Behavior)  Outcome: Improving  Flowsheets (Taken 2/19/2021 1785)  Mutually Determined Action Steps (Improved Impulse and Aggression Control):   verbalizes insight re: need for meds   identifies harmful thoughts     Pt transferred to station 10 [address] per provider for pt's safety, as a peer was having inappropriate boundaries toward patient. Pt shows ability to maintain behavioral control, mood is anxious though managed through coping mechanisms and PRN medications. Pt denies SI/SIB, hallucinations, racing thoughts. Pt reports feeling restless and appears such- treated with scheduled propranolol, VS WNL and stable. No other concerns at this time. Transfer completed without issue. Pt appears comfortable and calm with transfer. All belongings and med room items were sent with her to 10N.

## 2021-02-19 NOTE — PLAN OF CARE
Pt had an overall uneventful evening.  She spent much of the evening pacing with headphones on.  She has difficulty staying still or focusing on anything for very long.  She reported feeling restless, anxious, and somewhat agitated. She requested and was given PRN cogentin.  Her room is quite disorganized and she has adequate hygiene. She had no behavioral outburst this evening.  She was medication compliant.  She denied SI/SIB/HI or any psychotic symptoms.

## 2021-02-19 NOTE — PROGRESS NOTES
02/18/21 2200   Groups   Details   (Psychotherapy)   Number of patients attending the group:  3  Group Length:  0.5 Hours    Group Therapy Type: Psychotherapy- Strengths    Summary of Group / Topics Discussed:      The  Psychotherapy group goal is to promote insight to positive choice and change. Group processing is within a supportive and safe environment. Patients will process emotions using verbal group and expressive psychotherapy interventions including visual art/writing interventions.    Group interventions support patients by: creative self expression, self esteem and self efficacy/empowerment    Modalities to reach these goals include: positive/solution focused psychology  and Expressive Arts Therapies    Subjective -patient report of mood today- Ok    Objective/ Intervention- Goal of group and Therapeutic modality utilized- animal connections/ stength based activity    Group Response- Group was cut short due to a code 21 on the unit. Pts were accepting of this    Patient Response-She did a child like drawing of a simple cat. She said evelin are resilient. She chose a song to listen to. She and the male in group had poor boundaries and bear watching. He was instigating but she was following. They weren't touching but a lot of looks, laughing and flirtatious behaviors.    Joel Locke, DANIELFT, ATR-BC
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
## 2021-02-19 NOTE — PROGRESS NOTES
Writer called patient's mother to inform her patient transferred to station 10N. Writer also left a voice message for [facility]  informing her of the transfer as well.    [place] Sheryl CM:  Leatha Yepez @ [phone]

## 2021-02-19 NOTE — PROGRESS NOTES
Work Completed:  Patient transferred from Station 12.  She is committed as Mentally Ill and Chemically Dependent with a Carrillo Order in Place through [facility] for 6 months through August 3, 2021.  Patient was placed through [facility] on the list for treatment at one of the CARE Facilities.  Once she is accepted, patient will be transported under her Commitment Order by the [facility]'s Deputies directly to the facility.  This patient cannot be PDd by our hospital.  She must wait for MICD treatment at [facility].  Met with the patient to introduce myself and let her know that I would continue to follow up with CPS as far as her placement movement on the CARE list.      Discharge plan or goal:   Patient will enter CARE program under her MICD Commitment                Barriers to discharge:    Needs placement in CARE facility

## 2021-02-19 NOTE — PROGRESS NOTES
Pt transferred from st 12. Brief orientation given to pt. Pt appears to be no harm to self or others. Pt is tense but cooperative.

## 2021-02-19 NOTE — PLAN OF CARE
Work Completed: Attended Team Meeting: Reviewed chart notes.    Discharge plan or goal:  Referred to multiple CARE Programs                Barriers to discharge: Long wait lists at CARE facilities. Patient is continuing to stabilize.
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
## 2021-02-20 PROCEDURE — 250N000013 HC RX MED GY IP 250 OP 250 PS 637: Performed by: PSYCHIATRY & NEUROLOGY

## 2021-02-20 PROCEDURE — 250N000013 HC RX MED GY IP 250 OP 250 PS 637: Performed by: STUDENT IN AN ORGANIZED HEALTH CARE EDUCATION/TRAINING PROGRAM

## 2021-02-20 PROCEDURE — 124N000002 HC R&B MH UMMC

## 2021-02-20 RX ADMIN — PROPRANOLOL HYDROCHLORIDE 20 MG: 20 TABLET ORAL at 13:28

## 2021-02-20 RX ADMIN — LAMOTRIGINE 75 MG: 25 TABLET ORAL at 08:37

## 2021-02-20 RX ADMIN — OLANZAPINE 15 MG: 10 TABLET, ORALLY DISINTEGRATING ORAL at 18:27

## 2021-02-20 RX ADMIN — NICOTINE POLACRILEX 4 MG: 2 GUM, CHEWING ORAL at 16:29

## 2021-02-20 RX ADMIN — PROPRANOLOL HYDROCHLORIDE 20 MG: 20 TABLET ORAL at 08:37

## 2021-02-20 RX ADMIN — NICOTINE POLACRILEX 4 MG: 2 GUM, CHEWING ORAL at 08:37

## 2021-02-20 RX ADMIN — NICOTINE POLACRILEX 4 MG: 2 GUM, CHEWING ORAL at 18:19

## 2021-02-20 RX ADMIN — NICOTINE POLACRILEX 4 MG: 2 GUM, CHEWING ORAL at 09:58

## 2021-02-20 RX ADMIN — NICOTINE POLACRILEX 4 MG: 2 GUM, CHEWING ORAL at 13:28

## 2021-02-20 RX ADMIN — OLANZAPINE 15 MG: 10 TABLET, ORALLY DISINTEGRATING ORAL at 08:38

## 2021-02-20 RX ADMIN — NICOTINE POLACRILEX 4 MG: 2 GUM, CHEWING ORAL at 17:23

## 2021-02-20 RX ADMIN — PROPRANOLOL HYDROCHLORIDE 20 MG: 20 TABLET ORAL at 18:25

## 2021-02-20 RX ADMIN — BENZTROPINE MESYLATE 0.5 MG: 0.5 TABLET ORAL at 16:28

## 2021-02-20 RX ADMIN — METFORMIN HYDROCHLORIDE 500 MG: 500 TABLET, FILM COATED ORAL at 18:09

## 2021-02-20 RX ADMIN — FLUTICASONE FUROATE 1 PUFF: 100 POWDER RESPIRATORY (INHALATION) at 08:39

## 2021-02-20 RX ADMIN — NICOTINE POLACRILEX 4 MG: 2 GUM, CHEWING ORAL at 11:34

## 2021-02-20 RX ADMIN — NICOTINE POLACRILEX 4 MG: 2 GUM, CHEWING ORAL at 12:32

## 2021-02-20 RX ADMIN — BENZTROPINE MESYLATE 0.5 MG: 0.5 TABLET ORAL at 09:58

## 2021-02-20 RX ADMIN — METFORMIN HYDROCHLORIDE 500 MG: 500 TABLET, FILM COATED ORAL at 08:37

## 2021-02-20 RX ADMIN — LEVONORGESTREL AND ETHINYL ESTRADIOL 1 TABLET: KIT at 08:39
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
## 2021-02-20 NOTE — PLAN OF CARE
Pt had a good shift this evening. Pt spent time pacing the hallways, was social with peers, and watched some T.V. Pt required some redirection for her topic of conversation at times, to which she is not very responsive.    Psych: Pt denies all mental health concerns at this time including SI, SIB, HI, hallucinations, and depression.     Medical/physical:   Sleep: Pt denies concern with sleeping. Goes to bed early.   Appetite: Pt denies concerns. Pt got order for double portions today, which she is happy with. She has several snacks throughout the evening.   Toileting: Pt denies concerns. She reports she had a BM earlier today and reports she usually has one or two a day.   ADLs: Pt took a shower on day shift and is independent with ADLs.   Pain: pt denies.   -Pt continues to experience highly restless legs, per observation, but does not complain of this. She does report that she thinks the Cogentin has been helping.    Pt requested a  phone call.

## 2021-02-20 NOTE — PLAN OF CARE
Patient has been present in the milieu. Affect is blunted, flat. Mood is calm. She attended community meeting. She is social with peers. She did some coloring with her peers. Paced in the hallway a few times this shift. Denies all MH symptoms. She denies anxiety and depression. Denies SI/SIB. She complained of stomach pain, was offered Maalox which she declined but instead had ginger ale with good relief. She received nicotine gum a few times this shift. Also received cogentin which she reports helps with her restless leg. Appetite is good. Had adequate sleep last night.

## 2021-02-21 PROCEDURE — 250N000013 HC RX MED GY IP 250 OP 250 PS 637: Performed by: STUDENT IN AN ORGANIZED HEALTH CARE EDUCATION/TRAINING PROGRAM

## 2021-02-21 PROCEDURE — 250N000013 HC RX MED GY IP 250 OP 250 PS 637: Performed by: PSYCHIATRY & NEUROLOGY

## 2021-02-21 PROCEDURE — 124N000002 HC R&B MH UMMC

## 2021-02-21 RX ADMIN — NICOTINE POLACRILEX 4 MG: 2 GUM, CHEWING ORAL at 10:06

## 2021-02-21 RX ADMIN — LAMOTRIGINE 75 MG: 25 TABLET ORAL at 10:05

## 2021-02-21 RX ADMIN — MELATONIN TAB 3 MG 3 MG: 3 TAB at 22:12

## 2021-02-21 RX ADMIN — ALUMINUM HYDROXIDE, MAGNESIUM HYDROXIDE, AND SIMETHICONE 30 ML: 200; 200; 20 SUSPENSION ORAL at 14:10

## 2021-02-21 RX ADMIN — OLANZAPINE 15 MG: 10 TABLET, ORALLY DISINTEGRATING ORAL at 10:06

## 2021-02-21 RX ADMIN — BENZTROPINE MESYLATE 0.5 MG: 0.5 TABLET ORAL at 13:35

## 2021-02-21 RX ADMIN — PROPRANOLOL HYDROCHLORIDE 20 MG: 20 TABLET ORAL at 10:05

## 2021-02-21 RX ADMIN — FLUTICASONE FUROATE 1 PUFF: 100 POWDER RESPIRATORY (INHALATION) at 10:08

## 2021-02-21 RX ADMIN — NICOTINE POLACRILEX 4 MG: 2 GUM, CHEWING ORAL at 16:52

## 2021-02-21 RX ADMIN — HYDROXYZINE HYDROCHLORIDE 50 MG: 25 TABLET, FILM COATED ORAL at 16:55

## 2021-02-21 RX ADMIN — BENZTROPINE MESYLATE 0.5 MG: 0.5 TABLET ORAL at 15:47

## 2021-02-21 RX ADMIN — NICOTINE POLACRILEX 4 MG: 2 GUM, CHEWING ORAL at 11:16

## 2021-02-21 RX ADMIN — OLANZAPINE 15 MG: 10 TABLET, ORALLY DISINTEGRATING ORAL at 18:58

## 2021-02-21 RX ADMIN — NICOTINE POLACRILEX 4 MG: 2 GUM, CHEWING ORAL at 17:57

## 2021-02-21 RX ADMIN — LEVONORGESTREL AND ETHINYL ESTRADIOL 1 TABLET: KIT at 10:08

## 2021-02-21 RX ADMIN — NICOTINE POLACRILEX 4 MG: 2 GUM, CHEWING ORAL at 19:24

## 2021-02-21 RX ADMIN — NICOTINE POLACRILEX 4 MG: 2 GUM, CHEWING ORAL at 13:33

## 2021-02-21 RX ADMIN — METFORMIN HYDROCHLORIDE 500 MG: 500 TABLET, FILM COATED ORAL at 17:48

## 2021-02-21 RX ADMIN — NICOTINE POLACRILEX 4 MG: 2 GUM, CHEWING ORAL at 12:27

## 2021-02-21 RX ADMIN — NICOTINE POLACRILEX 4 MG: 2 GUM, CHEWING ORAL at 22:13

## 2021-02-21 RX ADMIN — PROPRANOLOL HYDROCHLORIDE 20 MG: 20 TABLET ORAL at 18:58

## 2021-02-21 RX ADMIN — METFORMIN HYDROCHLORIDE 500 MG: 500 TABLET, FILM COATED ORAL at 10:06

## 2021-02-21 RX ADMIN — PROPRANOLOL HYDROCHLORIDE 20 MG: 20 TABLET ORAL at 13:33

## 2021-02-21 RX ADMIN — NICOTINE POLACRILEX 4 MG: 2 GUM, CHEWING ORAL at 15:46

## 2021-02-21 ASSESSMENT — ACTIVITIES OF DAILY LIVING (ADL)
LAUNDRY: WITH SUPERVISION
HYGIENE/GROOMING: INDEPENDENT
ORAL_HYGIENE: INDEPENDENT
DRESS: INDEPENDENT

## 2021-02-21 NOTE — PLAN OF CARE
Patient spent most of the day isolative /withdrawn in her room. She came out for both meals. Paced in the hallway a few times with the headphones on. She is calm and cooperative with staff. She presents with a blunted,flat affect. She denies all MH symptoms. C/O of restless leg and requested for prn cogentin. She received nicotine gum a few times this shift.

## 2021-02-21 NOTE — PLAN OF CARE
Patient has been calm this evening. Paced in hallway with headphones on. Appears withdrawn, no interaction with other peers. She ate most of her dinner. Requested her HS medications earlier, took them and she is now in bed. Had nicotine gums a few times this shift. She denies all MH symptoms. No SI/SIB. Denies depression. Reports anxiety but unable to rate it. Compliant with her medications. Sleeping at this time.
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
## 2021-02-22 PROCEDURE — 250N000013 HC RX MED GY IP 250 OP 250 PS 637: Performed by: PSYCHIATRY & NEUROLOGY

## 2021-02-22 PROCEDURE — 124N000002 HC R&B MH UMMC

## 2021-02-22 PROCEDURE — 99232 SBSQ HOSP IP/OBS MODERATE 35: CPT | Performed by: PSYCHIATRY & NEUROLOGY

## 2021-02-22 PROCEDURE — 250N000013 HC RX MED GY IP 250 OP 250 PS 637: Performed by: STUDENT IN AN ORGANIZED HEALTH CARE EDUCATION/TRAINING PROGRAM

## 2021-02-22 RX ORDER — BENZTROPINE MESYLATE 0.5 MG/1
0.5 TABLET ORAL 2 TIMES DAILY
Status: DISCONTINUED | OUTPATIENT
Start: 2021-02-22 | End: 2021-02-24

## 2021-02-22 RX ADMIN — BENZTROPINE MESYLATE 0.5 MG: 0.5 TABLET ORAL at 19:08

## 2021-02-22 RX ADMIN — LEVONORGESTREL AND ETHINYL ESTRADIOL 1 TABLET: KIT at 08:55

## 2021-02-22 RX ADMIN — NICOTINE POLACRILEX 4 MG: 2 GUM, CHEWING ORAL at 17:45

## 2021-02-22 RX ADMIN — PROPRANOLOL HYDROCHLORIDE 20 MG: 20 TABLET ORAL at 13:03

## 2021-02-22 RX ADMIN — NICOTINE POLACRILEX 4 MG: 2 GUM, CHEWING ORAL at 15:11

## 2021-02-22 RX ADMIN — NICOTINE POLACRILEX 4 MG: 2 GUM, CHEWING ORAL at 18:49

## 2021-02-22 RX ADMIN — NICOTINE POLACRILEX 2 MG: 2 GUM, CHEWING ORAL at 12:51

## 2021-02-22 RX ADMIN — BENZTROPINE MESYLATE 0.5 MG: 0.5 TABLET ORAL at 13:06

## 2021-02-22 RX ADMIN — OLANZAPINE 15 MG: 10 TABLET, ORALLY DISINTEGRATING ORAL at 08:52

## 2021-02-22 RX ADMIN — PROPRANOLOL HYDROCHLORIDE 20 MG: 20 TABLET ORAL at 08:51

## 2021-02-22 RX ADMIN — MELATONIN TAB 3 MG 3 MG: 3 TAB at 19:08

## 2021-02-22 RX ADMIN — METFORMIN HYDROCHLORIDE 500 MG: 500 TABLET, FILM COATED ORAL at 08:51

## 2021-02-22 RX ADMIN — LAMOTRIGINE 75 MG: 25 TABLET ORAL at 08:51

## 2021-02-22 RX ADMIN — FLUTICASONE FUROATE 1 PUFF: 100 POWDER RESPIRATORY (INHALATION) at 08:55

## 2021-02-22 RX ADMIN — METFORMIN HYDROCHLORIDE 500 MG: 500 TABLET, FILM COATED ORAL at 17:45

## 2021-02-22 RX ADMIN — NICOTINE POLACRILEX 2 MG: 2 GUM, CHEWING ORAL at 08:51

## 2021-02-22 RX ADMIN — NICOTINE POLACRILEX 4 MG: 2 GUM, CHEWING ORAL at 10:33

## 2021-02-22 RX ADMIN — OLANZAPINE 15 MG: 10 TABLET, ORALLY DISINTEGRATING ORAL at 19:08

## 2021-02-22 RX ADMIN — NICOTINE POLACRILEX 4 MG: 2 GUM, CHEWING ORAL at 13:50

## 2021-02-22 RX ADMIN — NICOTINE POLACRILEX 4 MG: 2 GUM, CHEWING ORAL at 16:27

## 2021-02-22 RX ADMIN — PROPRANOLOL HYDROCHLORIDE 20 MG: 20 TABLET ORAL at 19:08

## 2021-02-22 NOTE — PLAN OF CARE
"  Problem: Behavior Regulation Impairment (Disruptive Behavior)  Goal: Improved Impulse and Aggression Control (Disruptive Behavior)  Outcome: Improving  Flowsheets (Taken 2/21/2021 1846)  Mutually Determined Action Steps (Improved Impulse and Aggression Control):    seeks healthy outlet for aggression    verbalizes insight re: need for meds     Problem: Suicidal Behavior  Goal: Suicidal Behavior is Absent or Managed  Outcome: Improving  Note: Pt denies SI    Pt spent much of shift either pacing or watching tv in the lounge. She endorses restlessness, anxiety and feeling \"lonely.\" Pt requested to check in with writer twice during the shift. Pt just wanted to talk for a bit, then resume her other activities. Pt did not attend group, but she wanted to join when group was over [facility] over, and full. Pt denies SI/SIB. Pt rates anxiety at 7/10 and depression at 4/10. She states that anxiety is normally the bigger issue for her. Pt denies medical concerns other than akathisia. Pt endorses good sleep and appetite. She expressed that she would like to discontinue double portions. Pt received prn hydroxyzine at 1650 and prn melatonin at 2215.     "

## 2021-02-22 NOTE — PLAN OF CARE
Work Completed:  Met with Alicia who is anxious and said it is hard for her to be on long wait-lists with no timeline as to when she will be able to transfer.  Called [facility]PS and they have her on the list for [facility] (women only).  They requested updated MAR and Progress notes to review.  Told Alicia that I would call again at the end of the week for an update.  Encouraged her to attend programming although she reported she has.  Patient is [place] MICD Commitment with Carrillo Order: Medications on the Carrillo are: (Zyprexa up to 40mg/d or 30mg IM); (Risperdal 12mg/D or IM up to 40mg; Haldol 100mg/d or IM Haldol Deconate up to 400mg per month; and Abilify 40mg/d or 9.75mg IM).  At request of CPS, faxed them updated progress notes and MAR.    Discharge plan or goal:   Patient will transfer to a CARE Program                 Barriers to discharge:    Under MICD Commitment/Carrillo needs placement.
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
## 2021-02-22 NOTE — PLAN OF CARE
Patient hs been out of her room more today. She was did some coloring with some peers in the dining room table. Medication compliant. She plans to attend OT group this afternoon. Patient is calm and polite with her request. She presents with a blunted, flat affect. Reports depression of 3 out of 10 and anxiety of 5 out of 10. Denies all MH symptoms. Poor concentration. She is eating and sleeping okay. Prn cogentin given per patient's request.

## 2021-02-22 NOTE — PLAN OF CARE
BEHAVIORAL TEAM DISCUSSION    Participants: Dr. Willy Yi; Olive Ugarte [facility]; Rosita Cardoso RN  Progress: Patient taking medications, cooperative with care, pacing hallway due to elevated anxiety.  Finds the wait for CARE very anxiety producing.     Anticipated length of stay: Unknown  Continued Stay Criteria/Rationale: MICD/[facility] [place] needs placement at CARE  Medical/Physical: Nothing noted  Precautions:   Behavioral Orders   Procedures    Assault precautions    Code 1 - Restrict to Unit    Code 1 - Restrict to Unit    Elopement precautions    Fall precautions    Routine Programming     As clinically indicated    Status 15     Every 15 minutes.     Plan: Patient will be encouraged to attend programming daily.  Will follow-up with the [employer]PS regarding the CARE list.  Rationale for change in precautions or plan: No changes
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
## 2021-02-23 PROCEDURE — 250N000013 HC RX MED GY IP 250 OP 250 PS 637: Performed by: PSYCHIATRY & NEUROLOGY

## 2021-02-23 PROCEDURE — 250N000013 HC RX MED GY IP 250 OP 250 PS 637: Performed by: STUDENT IN AN ORGANIZED HEALTH CARE EDUCATION/TRAINING PROGRAM

## 2021-02-23 PROCEDURE — 99231 SBSQ HOSP IP/OBS SF/LOW 25: CPT | Performed by: PSYCHIATRY & NEUROLOGY

## 2021-02-23 PROCEDURE — 124N000002 HC R&B MH UMMC

## 2021-02-23 RX ADMIN — FLUTICASONE FUROATE 1 PUFF: 100 POWDER RESPIRATORY (INHALATION) at 08:45

## 2021-02-23 RX ADMIN — PROPRANOLOL HYDROCHLORIDE 20 MG: 20 TABLET ORAL at 08:45

## 2021-02-23 RX ADMIN — NICOTINE POLACRILEX 4 MG: 2 GUM, CHEWING ORAL at 13:53

## 2021-02-23 RX ADMIN — NICOTINE POLACRILEX 4 MG: 2 GUM, CHEWING ORAL at 15:27

## 2021-02-23 RX ADMIN — NICOTINE POLACRILEX 4 MG: 2 GUM, CHEWING ORAL at 16:26

## 2021-02-23 RX ADMIN — METFORMIN HYDROCHLORIDE 500 MG: 500 TABLET, FILM COATED ORAL at 17:57

## 2021-02-23 RX ADMIN — PROPRANOLOL HYDROCHLORIDE 20 MG: 20 TABLET ORAL at 13:21

## 2021-02-23 RX ADMIN — NICOTINE POLACRILEX 4 MG: 2 GUM, CHEWING ORAL at 17:58

## 2021-02-23 RX ADMIN — NICOTINE POLACRILEX 4 MG: 2 GUM, CHEWING ORAL at 08:43

## 2021-02-23 RX ADMIN — LEVONORGESTREL AND ETHINYL ESTRADIOL 1 TABLET: KIT at 08:45

## 2021-02-23 RX ADMIN — NICOTINE POLACRILEX 4 MG: 2 GUM, CHEWING ORAL at 11:31

## 2021-02-23 RX ADMIN — NICOTINE POLACRILEX 4 MG: 2 GUM, CHEWING ORAL at 12:37

## 2021-02-23 RX ADMIN — OLANZAPINE 15 MG: 10 TABLET, ORALLY DISINTEGRATING ORAL at 19:04

## 2021-02-23 RX ADMIN — NICOTINE POLACRILEX 4 MG: 2 GUM, CHEWING ORAL at 10:05

## 2021-02-23 RX ADMIN — BENZTROPINE MESYLATE 0.5 MG: 0.5 TABLET ORAL at 19:02

## 2021-02-23 RX ADMIN — OLANZAPINE 15 MG: 10 TABLET, ORALLY DISINTEGRATING ORAL at 08:44

## 2021-02-23 RX ADMIN — LAMOTRIGINE 75 MG: 25 TABLET ORAL at 08:45

## 2021-02-23 RX ADMIN — METFORMIN HYDROCHLORIDE 500 MG: 500 TABLET, FILM COATED ORAL at 08:44

## 2021-02-23 RX ADMIN — BENZTROPINE MESYLATE 0.5 MG: 0.5 TABLET ORAL at 08:44
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
## 2021-02-23 NOTE — PROGRESS NOTES
Behavioral Health  Note    Behavioral Health  Spirituality Group Note    [address]    Name: Alicia Rangel YOB: 1995   MRN: [id number] Age: 25 year old      Patient attended -led group, which included discussion of spirituality, coping with illness and building resilience.    Patient attended group for NC hrs.    patient minimally participated, but was respectful to the group process.      Jessica Vanessa  Chaplain Resident  Pager: [phone]

## 2021-02-23 NOTE — PLAN OF CARE
Patient has been pacing in and out of her room and in the hallway. She is calm and cooperative with staff. Minimal interaction noted between patient and other peers. Medication compliant. She reports mild depression and anxiety. Denies SI/SIB/AVH. Poor concentration. Eats and sleeps okay. Denies pain. On list for CARE program.

## 2021-02-23 NOTE — PLAN OF CARE
Work Completed:  Patient remains anxious and pacing the hallway.  On list for CARE program.  Will call [facility] on Friday to follow-up and see if they reviewed the documents we faxed yesterday.    Discharge plan or goal:  Patient will eventually be transferred to a CARE Program                Barriers to discharge:    Committed as MICD with [facility] in [facility] and needs placement.

## 2021-02-23 NOTE — PLAN OF CARE
"Pt had uneventful shift. Pt calm and cooperative with care plan. Pt visible in milieu, social with peers. Pt behaviorally appropriate. Pt appeared restless at times, swaying body back and forth. Pt took medications without incident. Pt denies SI/SIB/HI/AH/VH. Pt did not attended group. Pt showered this shift.  Pt expressed feeling \"good\" today. PRN melatonin administered to target sleep as pt endorsed effectiveness from the night prior.   "

## 2021-02-24 PROCEDURE — 99231 SBSQ HOSP IP/OBS SF/LOW 25: CPT | Performed by: PSYCHIATRY & NEUROLOGY

## 2021-02-24 PROCEDURE — 250N000013 HC RX MED GY IP 250 OP 250 PS 637: Performed by: PSYCHIATRY & NEUROLOGY

## 2021-02-24 PROCEDURE — 250N000013 HC RX MED GY IP 250 OP 250 PS 637: Performed by: STUDENT IN AN ORGANIZED HEALTH CARE EDUCATION/TRAINING PROGRAM

## 2021-02-24 PROCEDURE — 124N000002 HC R&B MH UMMC

## 2021-02-24 RX ORDER — BENZTROPINE MESYLATE 1 MG/1
1 TABLET ORAL 2 TIMES DAILY
Status: DISCONTINUED | OUTPATIENT
Start: 2021-02-24 | End: 2021-03-24 | Stop reason: HOSPADM

## 2021-02-24 RX ADMIN — LEVONORGESTREL AND ETHINYL ESTRADIOL 1 TABLET: KIT at 08:04

## 2021-02-24 RX ADMIN — LAMOTRIGINE 75 MG: 25 TABLET ORAL at 08:03

## 2021-02-24 RX ADMIN — BENZTROPINE MESYLATE 0.5 MG: 0.5 TABLET ORAL at 17:56

## 2021-02-24 RX ADMIN — NICOTINE POLACRILEX 2 MG: 2 GUM, CHEWING ORAL at 12:23

## 2021-02-24 RX ADMIN — METFORMIN HYDROCHLORIDE 500 MG: 500 TABLET, FILM COATED ORAL at 17:56

## 2021-02-24 RX ADMIN — NICOTINE POLACRILEX 4 MG: 2 GUM, CHEWING ORAL at 10:49

## 2021-02-24 RX ADMIN — PROPRANOLOL HYDROCHLORIDE 20 MG: 20 TABLET ORAL at 08:07

## 2021-02-24 RX ADMIN — OLANZAPINE 15 MG: 10 TABLET, ORALLY DISINTEGRATING ORAL at 08:03

## 2021-02-24 RX ADMIN — NICOTINE POLACRILEX 4 MG: 2 GUM, CHEWING ORAL at 16:36

## 2021-02-24 RX ADMIN — NICOTINE POLACRILEX 4 MG: 2 GUM, CHEWING ORAL at 08:04

## 2021-02-24 RX ADMIN — NICOTINE POLACRILEX 4 MG: 2 GUM, CHEWING ORAL at 13:52

## 2021-02-24 RX ADMIN — PROPRANOLOL HYDROCHLORIDE 20 MG: 20 TABLET ORAL at 19:02

## 2021-02-24 RX ADMIN — OLANZAPINE 15 MG: 10 TABLET, ORALLY DISINTEGRATING ORAL at 19:02

## 2021-02-24 RX ADMIN — BENZTROPINE MESYLATE 0.5 MG: 0.5 TABLET ORAL at 08:03

## 2021-02-24 RX ADMIN — PROPRANOLOL HYDROCHLORIDE 20 MG: 20 TABLET ORAL at 13:14

## 2021-02-24 RX ADMIN — BENZTROPINE MESYLATE 1 MG: 1 TABLET ORAL at 19:10

## 2021-02-24 RX ADMIN — FLUTICASONE FUROATE 1 PUFF: 100 POWDER RESPIRATORY (INHALATION) at 08:04

## 2021-02-24 RX ADMIN — NICOTINE POLACRILEX 4 MG: 2 GUM, CHEWING ORAL at 09:00

## 2021-02-24 RX ADMIN — NICOTINE POLACRILEX 4 MG: 2 GUM, CHEWING ORAL at 17:56

## 2021-02-24 RX ADMIN — METFORMIN HYDROCHLORIDE 500 MG: 500 TABLET, FILM COATED ORAL at 08:03

## 2021-02-24 NOTE — PLAN OF CARE
Problem: Behavioral Health Plan of Care  Goal: Plan of Care Review  Outcome: Improving    Pt was visible in the lounge off and on. Was brief and avoidant. Was pacing the halls restless at the start of the shift. Denied AH/SI/SIb. Reported her sleep and appetite as good. Denied any medication side effects. Declined her bedtime propanolol.   Plan: Status 15s; Build trust with pt. Continue to build on strengths. Encourage compliance and healthy coping.

## 2021-02-24 NOTE — PLAN OF CARE
Work Completed:  Team met to discuss patient's progress.  She paces, is anxious but quiet.  Compliant with all care.  Taking medications. Updated her again today that I would be calling [facility] on Friday to check on her spot on the wait list for CARE.  Had voicemail from Maurice Keita   Leatha Kong.  Called and left her a voicemail with the updates on the patient.  Patient's mother Michi left a message asking for an update.  Called mother back to give her an update.  She feels badly that the wait-lists for CARE are so long.  Let her know that I have already called [facility] and sent updated records to them and will be calling several times a week for updates.  Provided her mother with my contact information. She thanked this writer for taking the time to talk with her.    Discharge plan or goal:   Patient will eventually transfer to a CARE program under her [facility] Commitment                Barriers to discharge:    Needs placement at CARE

## 2021-02-24 NOTE — PROGRESS NOTES
[facility], [place]   Psychiatric Progress Note  Hospital Day: 37        Interim History:   The patient's care was discussed with the treatment team during the daily team meeting and/or staff's chart notes were reviewed.   Staff report: calm evening and morning. Still paces a lot with or without headphones on.  Adherent to medications.     Interview: was seen today at the patients' lounge. She reported still feeling very restless, agreed with my suggestion to increase her Cogentin. Asked about discharge date, I informed her that she was on a waiting list for CARE unit and would be transferred there as soon as bed becomes available. Alicia was at least partially receptive to my words and had no other questions or concerns. She also denied having Suicidal ideation, Homicidal thoughts, Auditory hallucinations, Visual hallucinations.          Medications:       benztropine  1 mg Oral BID     fluticasone  1 puff Inhalation Daily     lamoTRIgine  75 mg Oral Daily     levonorgestrel-ethinyl estradiol  1 tablet Oral Daily     metFORMIN  500 mg Oral BID w/meals     OLANZapine zydis  15 mg Oral BID    Or     OLANZapine  10 mg Intramuscular BID     propranolol  20 mg Oral TID          Allergies:     Allergies   Allergen Reactions     Nkda [No Known Drug Allergies]           Labs:     No results found for this or any previous visit (from the past 48 hour(s)).       Psychiatric Examination:     /68   Pulse 92   Temp 98.4  F (36.9  C) (Tympanic)   Resp 16   Wt 65.8 kg (145 lb 1.6 oz)   SpO2 100%   BMI 22.73 kg/m    Weight is 145 lbs 1.6 oz  Body mass index is 22.73 kg/m .     Orthostatic Vitals       Most Recent      Sitting Orthostatic /71 02/24 0803    Sitting Orthostatic Pulse (bpm) 82 02/24 0803    Standing Orthostatic /76 02/24 0803    Standing Orthostatic Pulse (bpm) 86 02/24 0803         Appearance: awake, alert, adequately groomed, alert.   Eye Contact: fair  Mood:   "\"fine\"  Affect: less Irritable, labile, intense   Speech:  clear, coherent and normal prosody,   Language: fluent and intact in English  Psychomotor, Gait, Musculoskeletal:  no evidence of tardive dyskinesia, dystonia, or tics  Thought Process: organized  Associations:  no loose associations  Thought Content:  No evidence of SI/HI/paranoia, no overt delusions expressed today.  Insight:  limited, possibly, improving?   Judgement:  limited, improving?  Oriented to:  time, person, and place  Attention Span and Concentration:  fair  Recent and Remote Memory:  improving,   Fund of Knowledge:  appropriate    Clinical Global Impressions  First:  Considering your total clinical experience with this particular patient population, how severe are the patient's symptoms at this time?: 6 (01/21/21 1437)  Compared to the patient's condition at the START of treatment, this patient's condition is: 3 (01/21/21 1437)  Most recent:  Considering your total clinical experience with this particular patient population, how severe are the patient's symptoms at this time?: 5 (2/22/2021)  Compared to the patient's condition at the START of treatment, this patient's condition is: 3 (2/22/2021)         Precautions:     Behavioral Orders   Procedures     Assault precautions     Code 1 - Restrict to Unit     Code 1 - Restrict to Unit     Elopement precautions     Fall precautions     Routine Programming     As clinically indicated     Status 15     Every 15 minutes.          Diagnoses:     Bipolar I Disorder, current episode manic, resolving  Amphetamine use disorder, severe  Cannabis use disorder  Akathisia   Foot pain/dry skin         Assessment & Plan:     Assessment and hospital summary:  This patient is a 25 year old woman with history of bipolar one disorder and substance use use (methamphetamine, alcohol, cocaine, heroin, cannabis) who presented to ED via EMS with agitation and aggression in context of methamphetamine and heroin use and " "likely medication nonadherence. Her symptoms of responding to internal stimuli, aggression, and disorganization are consistent with a manic episode with psychotic features with potential effects of amphetamine intoxication and opioid withdrawal. She has a history of multiple similar presentations in the ED recently but her symptoms have resolved more quickly, suggesting that her symptoms this time may be more due to her bipolar disorder than substance-induced. Alicia has a history of multiple similar presentations at prior hospitalizations. She has done well in these hospitalizations with an antipsychotic and sometimes a mood stabilizer. She has also required commitment and [facility] for several of these hospitalizations. Therefore, based on her history and current presentation, she would likely benefit from a mood stabilizer and an antipsychotic to target her manic symptoms, agitation, and psychosis.      Alicia continued to have agitation after admission and declined Depakote. Given her imminent risk to herself and others and poor insight into her mental health, a petition for commitment and Carrillo were sent. As she was declining Depakote and requesting PTA Lamictal, Depakote was stopped and Lamictal initiated. Discussed need for adherence and that Lamictal is not effective in jackie prevention. Alicia began taking Zyprexa 5mg TID two days after admission. Gabapentin PRN was started to target opioid withdrawal as she had low-normal blood pressures and this would therefore be preferred over clonidine for symptoms management.      On 1/25, patient postured toward staff in an aggressive manner. She required seclusion and emergency neuroleptic medications. Psychiatric emergency was declared on 1/26, and Zyprexa was increased, and backed up with IM. Propranolol was also added to target anxiety and suspected akathisia. On 1/28 patient was threatening staff and wanting to leave room to \"attack\" staff, was not " redirectable, required use of seclusion. She again required seclusion on 2/3 after becoming agitated and threatening to kill staff.  Psychiatric emergency declared 1/26 as patient postured toward staff with the intent to assault them on 1/25.  Pt had another episode of seclusion on 1/28.  Team pursued commitment with ector, which was granted by [facility] starting 2/11/21.    At this time, patient is showing limited improvement on olanzapine daily olanzapine dose of 30-35mg/day. However, she is unwilling to consider any alternative neuroleptic medications or non-neuroleptic mood stabilizers. She is not interested in Depakote or lithium because of weight gain. She becomes extremely agitated if we discuss medication options and abruptly terminates interview. We are considering cross titrating to risperidone as mother reports she did well on risperidone in the past, however at this time Alicia is extremely opposed to a medication change. Patient did mention she prefers Abilify to other antipsychotics, so we will keep this in consideration. As she is beginning to demonstrate some subtle improvements (participated in group for 15min, less aggression and hostility towards staff), we will continue maximum olanzapine dose for now.     She does seem to be experiencing significant restlessness, possibly as a side effect of olanzapine. Per her chart, she tried benztropine over 5 years ago. Will address akathisia with Prn medication for now as olanzapine is helping with jackie, psychosis and agitation.     Psychiatric treatment/inteventions:    Today's changes  -None      Medications:   # Bipolar I Disorder, current manic episode  - Lamictal 50mg daily (initiated 25mg on 1/20 per pt request, and increased to 50mg on 2/5). She understands that this is not first line treatment for jackie.   -- increased to 75mg on 2/19   - Zyprexa 15 mg BID PO or 10mg IM (most recently increased on 2/5). She has Carrillo that includes olanzapine  and she may not decline medication.   -continue to discuss recommendation to start non-neuroleptic mood stabilizer outside of lamotrigine that would be first line for jackie, though patient has consistently declined due to concerns about weight gain.     #akathisia  - increase benztropine to 1 mg BID  -Continue propranolol 20 mg TID to target akathisia and anxiety    PRNs  Akathisia  Benztropine    anxiety  -continue hydroxyzine 25-50 mg q4h prn for acute anxiety - first line  - clonidine 0.1mg BID PRN or anxiety - 2nd line for anxiety   - Gabapentin 300mg TID PRN - 3rd line for anxiety     insomnia  -continue melatonin PRN insomnia    Agitation:   -continue Zyprexa 5mg tid prn for severe agitation  -continue Haldol 5, + 50 Benadryl PRN severe agitation/aggression  - lorazepam PRN discontinued 2/11     Laboratory/Imaging: Ordered repeat COVID per protocol given pt has been hospitalized >1 week      Patient will be treated in therapeutic milieu with appropriate individual and group therapies as described.     Medical treatment/interventions:  Medical concerns:   #headache  -acetaminophen 650mg q4h PRN     # UTI, resolved:  -pt denying any further urinary symptoms   - completed treatment with Bactrim      # Migraines:  -continue PTA sumatriptan PRN     #Opioid Withdrawal, appears resolved:   -continue Gabapentin 300mg TID PRN opioid withdrawal symptoms  -continue PRN clonidine 0.1mg BID, Imodium, Zofran, Tylenol  -Discontinued COWS 1/22 given absence of significant withdrawal    #Dry Feet  -ordered PRN Eucerin cream, continue to monitor       Disposition Plan   Reason for ongoing admission: poses an imminent risk to self, poses an imminent risk to others and is unable to care for self due to severe psychosis or jackie  Discharge location: pursuing CARE given pts poor insight/judgement and severity of symptoms  Discharge Medications: not ordered  Follow-up Appointments: not scheduled  Legal Status: full commitment with  [facility] for aripiprazole, risperidone, olanzapine, clozapine, haloperidol.         Willy Yi MD  [facility]

## 2021-02-24 NOTE — PLAN OF CARE
Patient spent time pacing in the hallways, she was in and out of her room, she spent some time in the lounge area watching television. Restless, doesn't sit still  in one spot for long. She is calm and cooperative with staff. Medication compliant. She denies all MH symptoms. Patient denies pain. Adequate grooming. She eats and sleeps okay. She is on the waiting list to go to CARE program.

## 2021-02-24 NOTE — PROVIDER NOTIFICATION
02/24/21 0608   Sleep/Rest/Relaxation   Sleep/Rest/Relaxation appears asleep;periodic limb movements during sleep;no problem identified   Night Time # Hours 6.5 hours       Pt had a quiet night with no behavior or safety concerns. Was observed sleeping during the safety checks.

## 2021-02-25 PROCEDURE — 99231 SBSQ HOSP IP/OBS SF/LOW 25: CPT | Performed by: PSYCHIATRY & NEUROLOGY

## 2021-02-25 PROCEDURE — 250N000013 HC RX MED GY IP 250 OP 250 PS 637: Performed by: PSYCHIATRY & NEUROLOGY

## 2021-02-25 PROCEDURE — 250N000013 HC RX MED GY IP 250 OP 250 PS 637: Performed by: STUDENT IN AN ORGANIZED HEALTH CARE EDUCATION/TRAINING PROGRAM

## 2021-02-25 PROCEDURE — G0177 OPPS/PHP; TRAIN & EDUC SERV: HCPCS

## 2021-02-25 PROCEDURE — 124N000002 HC R&B MH UMMC

## 2021-02-25 RX ADMIN — METFORMIN HYDROCHLORIDE 500 MG: 500 TABLET, FILM COATED ORAL at 08:12

## 2021-02-25 RX ADMIN — NICOTINE POLACRILEX 4 MG: 2 GUM, CHEWING ORAL at 17:09

## 2021-02-25 RX ADMIN — NICOTINE POLACRILEX 4 MG: 2 GUM, CHEWING ORAL at 15:17

## 2021-02-25 RX ADMIN — NICOTINE POLACRILEX 4 MG: 2 GUM, CHEWING ORAL at 10:07

## 2021-02-25 RX ADMIN — NICOTINE POLACRILEX 4 MG: 2 GUM, CHEWING ORAL at 11:28

## 2021-02-25 RX ADMIN — PROPRANOLOL HYDROCHLORIDE 20 MG: 20 TABLET ORAL at 19:00

## 2021-02-25 RX ADMIN — OLANZAPINE 15 MG: 10 TABLET, ORALLY DISINTEGRATING ORAL at 19:00

## 2021-02-25 RX ADMIN — LEVONORGESTREL AND ETHINYL ESTRADIOL 1 TABLET: KIT at 08:13

## 2021-02-25 RX ADMIN — BENZTROPINE MESYLATE 1 MG: 1 TABLET ORAL at 19:00

## 2021-02-25 RX ADMIN — NICOTINE POLACRILEX 4 MG: 2 GUM, CHEWING ORAL at 18:05

## 2021-02-25 RX ADMIN — NICOTINE POLACRILEX 4 MG: 2 GUM, CHEWING ORAL at 08:13

## 2021-02-25 RX ADMIN — PROPRANOLOL HYDROCHLORIDE 20 MG: 20 TABLET ORAL at 08:14

## 2021-02-25 RX ADMIN — METFORMIN HYDROCHLORIDE 500 MG: 500 TABLET, FILM COATED ORAL at 17:59

## 2021-02-25 RX ADMIN — FLUTICASONE FUROATE 1 PUFF: 100 POWDER RESPIRATORY (INHALATION) at 08:13

## 2021-02-25 RX ADMIN — OLANZAPINE 15 MG: 10 TABLET, ORALLY DISINTEGRATING ORAL at 08:12

## 2021-02-25 RX ADMIN — HYDROXYZINE HYDROCHLORIDE 25 MG: 25 TABLET, FILM COATED ORAL at 12:11

## 2021-02-25 RX ADMIN — PROPRANOLOL HYDROCHLORIDE 20 MG: 20 TABLET ORAL at 13:06

## 2021-02-25 RX ADMIN — NICOTINE POLACRILEX 4 MG: 2 GUM, CHEWING ORAL at 13:06

## 2021-02-25 RX ADMIN — LAMOTRIGINE 75 MG: 25 TABLET ORAL at 08:12

## 2021-02-25 RX ADMIN — BENZTROPINE MESYLATE 1 MG: 1 TABLET ORAL at 08:12

## 2021-02-25 ASSESSMENT — ACTIVITIES OF DAILY LIVING (ADL)
LAUNDRY: WITH SUPERVISION
HYGIENE/GROOMING: INDEPENDENT
DRESS: INDEPENDENT
ORAL_HYGIENE: INDEPENDENT

## 2021-02-25 NOTE — PLAN OF CARE
"Nursing Assessment    Mental Status Exam:  Appearance: Well groomed, neat  Behavior/relationship to examiner/demeanor: Calm and cooperative  Affect : Blunted    Mood : Reports mood is \"good, fine\"   Speech rate, volume, coherence: Normal speech rate, volume, and is coherent.   Attention/Concentration: Easily distracted  Insight: limited, some improvement  Judgment: impaired    Suicide Assessment:   Recent suicidal thoughts: No   Any attempts in the last 24 hours: No   Plan or considering various methods: No   Access to means: No   Verbal or Written contract for safety: Yes   Self Injurious Behavior: Denies, none observed    Goal: no stated goal this shift.     General Shift Summary  Visible in milieu and  interacts appropriately with peers, though there was one instance where staff had to redirect topic of conversation to something more therapeutic. She was responsive to redirection and agreeable.  Depression a level 0/10, anxiety 0/10,   HI, aggressive behaviors not present.  Auditory/visual hallucinations: Denies, does not appear to be responding   Patient is medication compliant and reported no side effects. PRN medications: Given 0.5 mg of Cogentin PRN for restlessness related to akathisia.  Hygiene: Well groomed  Eating: Ate all meals  Sleep: Reports sleep is \"good\".   Plan is to continue to monitor patient status q 15 mins, assess response to medications, and maintain the patients safety.    "

## 2021-02-25 NOTE — PROGRESS NOTES
[facility], [place]   Psychiatric Progress Note  Hospital Day: 38        Interim History:   The patient's care was discussed with the treatment team during the daily team meeting and/or staff's chart notes were reviewed. Staff report: calm evening and morning. Still paces a lot with or without headphones on.  Adherent to medications.     Interview: was seen today at the patients' lounge. She reported still feeling  restless, however, agreed that recently she had been pacing less. Asked about discharge date, I informed her that she was on a waiting list for CARE unit and would be transferred there as soon as bed becomes available. Told her that tomorrow [facility] would call to [facility] to get update about her placement. Alicia accepted my explanation. She also denied having Suicidal ideation, Homicidal thoughts, Auditory hallucinations, Visual hallucinations.          Medications:       benztropine  1 mg Oral BID     fluticasone  1 puff Inhalation Daily     lamoTRIgine  75 mg Oral Daily     levonorgestrel-ethinyl estradiol  1 tablet Oral Daily     metFORMIN  500 mg Oral BID w/meals     OLANZapine zydis  15 mg Oral BID    Or     OLANZapine  10 mg Intramuscular BID     propranolol  20 mg Oral TID          Allergies:     Allergies   Allergen Reactions     Nkda [No Known Drug Allergies]           Labs:     No results found for this or any previous visit (from the past 48 hour(s)).       Psychiatric Examination:     /70   Pulse 76   Temp 97.9  F (36.6  C) (Oral)   Resp 16   Wt 65.8 kg (145 lb 1.6 oz)   SpO2 97%   BMI 22.73 kg/m    Weight is 145 lbs 1.6 oz  Body mass index is 22.73 kg/m .     Orthostatic Vitals       Most Recent      Sitting Orthostatic /74 02/25 0813    Sitting Orthostatic Pulse (bpm) 85 02/25 0813    Standing Orthostatic BP --  Comment: Pt Declined 02/25 0813    Standing Orthostatic Pulse (bpm) --  Comment: Pt declined 02/25 0813         Appearance:  "awake, alert, adequately groomed, alert.   Eye Contact: fair  Mood:  \"fine\"  Affect: less Irritable, labile, intense   Speech:  clear, coherent and normal prosody,   Language: fluent and intact in English  Psychomotor, Gait, Musculoskeletal:  no evidence of tardive dyskinesia, dystonia, or tics  Thought Process: organized  Associations:  no loose associations  Thought Content:  No evidence of SI/HI/paranoia, no overt delusions expressed today.  Insight:  limited, possibly, improving?   Judgement:  limited, improving?  Oriented to:  time, person, and place  Attention Span and Concentration:  fair  Recent and Remote Memory:  improving,   Fund of Knowledge:  appropriate    Clinical Global Impressions  First:  Considering your total clinical experience with this particular patient population, how severe are the patient's symptoms at this time?: 6 (01/21/21 1437)  Compared to the patient's condition at the START of treatment, this patient's condition is: 3 (01/21/21 1437)  Most recent:  Considering your total clinical experience with this particular patient population, how severe are the patient's symptoms at this time?: 5 (2/22/2021)  Compared to the patient's condition at the START of treatment, this patient's condition is: 3 (2/22/2021)         Precautions:     Behavioral Orders   Procedures     Assault precautions     Code 1 - Restrict to Unit     Code 1 - Restrict to Unit     Elopement precautions     Fall precautions     Routine Programming     As clinically indicated     Status 15     Every 15 minutes.          Diagnoses:     Bipolar I Disorder, current episode manic, resolving  Amphetamine use disorder, severe  Cannabis use disorder  Akathisia   Foot pain/dry skin         Assessment & Plan:     Assessment and hospital summary:  This patient is a 25 year old woman with history of bipolar one disorder and substance use use (methamphetamine, alcohol, cocaine, heroin, cannabis) who presented to ED via " EMS with agitation and aggression in context of methamphetamine and heroin use and likely medication nonadherence. Her symptoms of responding to internal stimuli, aggression, and disorganization are consistent with a manic episode with psychotic features with potential effects of amphetamine intoxication and opioid withdrawal. She has a history of multiple similar presentations in the ED recently but her symptoms have resolved more quickly, suggesting that her symptoms this time may be more due to her bipolar disorder than substance-induced. Alicia has a history of multiple similar presentations at prior hospitalizations. She has done well in these hospitalizations with an antipsychotic and sometimes a mood stabilizer. She has also required commitment and Carrillo for several of these hospitalizations. Therefore, based on her history and current presentation, she would likely benefit from a mood stabilizer and an antipsychotic to target her manic symptoms, agitation, and psychosis.      Alicia continued to have agitation after admission and declined Depakote. Given her imminent risk to herself and others and poor insight into her mental health, a petition for commitment and Carrillo were sent. As she was declining Depakote and requesting PTA Lamictal, Depakote was stopped and Lamictal initiated. Discussed need for adherence and that Lamictal is not effective in jackie prevention. Alicia began taking Zyprexa 5mg TID two days after admission. Gabapentin PRN was started to target opioid withdrawal as she had low-normal blood pressures and this would therefore be preferred over clonidine for symptoms management.      On 1/25, patient postured toward staff in an aggressive manner. She required seclusion and emergency neuroleptic medications. Psychiatric emergency was declared on 1/26, and Zyprexa was increased, and backed up with IM. Propranolol was also added to target anxiety and suspected akathisia. On 1/28 patient was  "threatening staff and wanting to leave room to \"attack\" staff, was not redirectable, required use of seclusion. She again required seclusion on 2/3 after becoming agitated and threatening to kill staff.  Psychiatric emergency declared 1/26 as patient postured toward staff with the intent to assault them on 1/25.  Pt had another episode of seclusion on 1/28.  Team pursued commitment with ector, which was granted by [facility] starting 2/11/21.    At this time, patient is showing limited improvement on olanzapine daily olanzapine dose of 30-35mg/day. However, she is unwilling to consider any alternative neuroleptic medications or non-neuroleptic mood stabilizers. She is not interested in Depakote or lithium because of weight gain. She becomes extremely agitated if we discuss medication options and abruptly terminates interview. We are considering cross titrating to risperidone as mother reports she did well on risperidone in the past, however at this time Alicia is extremely opposed to a medication change. Patient did mention she prefers Abilify to other antipsychotics, so we will keep this in consideration. As she is beginning to demonstrate some subtle improvements (participated in group for 15min, less aggression and hostility towards staff), we will continue maximum olanzapine dose for now.     She does seem to be experiencing significant restlessness, possibly as a side effect of olanzapine. Per her chart, she tried benztropine over 5 years ago. Will address akathisia with Prn medication for now as olanzapine is helping with jackie, psychosis and agitation.     Psychiatric treatment/inteventions:    Today's changes  -None      Medications:   # Bipolar I Disorder, current manic episode  - Lamictal 50mg daily (initiated 25mg on 1/20 per pt request, and increased to 50mg on 2/5). She understands that this is not first line treatment for jackie.   -- increased to 75mg on 2/19   - Zyprexa 15 mg BID PO or 10mg IM " (most recently increased on 2/5). She has Carrillo that includes olanzapine and she may not decline medication.   -continue to discuss recommendation to start non-neuroleptic mood stabilizer outside of lamotrigine that would be first line for jackie, though patient has consistently declined due to concerns about weight gain.     #akathisia, appears to be improving with less pacing  - continue benztropine to 1 mg BID  -Continue propranolol 20 mg TID to target akathisia and anxiety    PRNs  Akathisia  Benztropine    anxiety  -continue hydroxyzine 25-50 mg q4h prn for acute anxiety - first line  - clonidine 0.1mg BID PRN or anxiety - 2nd line for anxiety   - Gabapentin 300mg TID PRN - 3rd line for anxiety     insomnia  -continue melatonin PRN insomnia    Agitation:   -continue Zyprexa 5mg tid prn for severe agitation  -continue Haldol 5, + 50 Benadryl PRN severe agitation/aggression  - lorazepam PRN discontinued 2/11     Laboratory/Imaging: Ordered repeat COVID per protocol given pt has been hospitalized >1 week      Patient will be treated in therapeutic milieu with appropriate individual and group therapies as described.     Medical treatment/interventions:  Medical concerns:   #headache  -acetaminophen 650mg q4h PRN     # UTI, resolved:  -pt denying any further urinary symptoms   - completed treatment with Bactrim      # Migraines:  -continue PTA sumatriptan PRN     #Opioid Withdrawal, appears resolved:   -continue Gabapentin 300mg TID PRN opioid withdrawal symptoms  -continue PRN clonidine 0.1mg BID, Imodium, Zofran, Tylenol  -Discontinued COWS 1/22 given absence of significant withdrawal    #Dry Feet  -ordered PRN Eucerin cream, continue to monitor       Disposition Plan   Reason for ongoing admission: poses an imminent risk to self, poses an imminent risk to others and is unable to care for self due to severe psychosis or jackie  Discharge location: pursuing CARE given pts poor insight/judgement and severity of  symptoms  Discharge Medications: not ordered  Follow-up Appointments: not scheduled  Legal Status: full commitment with [facility] for aripiprazole, risperidone, olanzapine, clozapine, haloperidol.     Willy Yi MD  [facility]

## 2021-02-25 NOTE — PROGRESS NOTES
02/25/21 0624   Sleep/Rest/Relaxation   Sleep/Rest/Relaxation appears asleep;periodic limb movements during sleep   Pt slept 6.75 hours.

## 2021-02-25 NOTE — PROGRESS NOTES
02/25/21 1500   Psycho Education   Type of Intervention structured groups   Response participates, initiates socially appropriate   Hours 1   Patient able to report she was brought to hospital by police after being found in the [employer] doing drugs. Upset the police gave her Ketamine without her permission. Cites getting irritable as a trigger for her abuse of drugs.  Glad she has a loving family, AA, NA and treatment to look forward to stating she is ready but upset about the long wait-list for CARE.

## 2021-02-25 NOTE — PLAN OF CARE
Patient has been visible in the milieu. She paced in the hallway on and off. She is calm. She is social with peers. She has no stated goal for today but sates she will attend OT group this afternoon. She is medication compliant. Reports low depression, rates it 2-3 out of 10. Reports high anxiety of 7-8 out of 10. Patient was given prn hydroxyzine. She was given nicotine gums multiple times this shift. She denies SI/SIB/AVH. Patient states she is hungry all the time but she does not want to have double portions, she wants snacks in between meals, writer will contact the dietitian to address. She slept well last night. Denies pain.

## 2021-02-25 NOTE — PLAN OF CARE
Work Completed:  Met with patient who remains anxious about her long wait for a placement in the [facility] program.  She asks daily and this writer updates her.  Told her today that I would call [facility] again tomorrow to see if I can get any further information.    Discharge plan or goal  Patient will transfer eventually with the [facility] to [facility] program                Barriers to discharge:    Waiting for placement at [facility]

## 2021-02-26 PROCEDURE — 250N000013 HC RX MED GY IP 250 OP 250 PS 637: Performed by: PSYCHIATRY & NEUROLOGY

## 2021-02-26 PROCEDURE — 90853 GROUP PSYCHOTHERAPY: CPT

## 2021-02-26 PROCEDURE — 99231 SBSQ HOSP IP/OBS SF/LOW 25: CPT | Performed by: PSYCHIATRY & NEUROLOGY

## 2021-02-26 PROCEDURE — 250N000013 HC RX MED GY IP 250 OP 250 PS 637: Performed by: STUDENT IN AN ORGANIZED HEALTH CARE EDUCATION/TRAINING PROGRAM

## 2021-02-26 PROCEDURE — 124N000002 HC R&B MH UMMC

## 2021-02-26 PROCEDURE — G0177 OPPS/PHP; TRAIN & EDUC SERV: HCPCS

## 2021-02-26 RX ADMIN — METFORMIN HYDROCHLORIDE 500 MG: 500 TABLET, FILM COATED ORAL at 08:19

## 2021-02-26 RX ADMIN — BENZTROPINE MESYLATE 1 MG: 1 TABLET ORAL at 19:09

## 2021-02-26 RX ADMIN — METFORMIN HYDROCHLORIDE 500 MG: 500 TABLET, FILM COATED ORAL at 17:48

## 2021-02-26 RX ADMIN — LAMOTRIGINE 75 MG: 25 TABLET ORAL at 08:19

## 2021-02-26 RX ADMIN — PROPRANOLOL HYDROCHLORIDE 20 MG: 20 TABLET ORAL at 08:19

## 2021-02-26 RX ADMIN — NICOTINE POLACRILEX 4 MG: 2 GUM, CHEWING ORAL at 08:24

## 2021-02-26 RX ADMIN — NICOTINE POLACRILEX 4 MG: 2 GUM, CHEWING ORAL at 18:01

## 2021-02-26 RX ADMIN — OLANZAPINE 15 MG: 10 TABLET, ORALLY DISINTEGRATING ORAL at 19:10

## 2021-02-26 RX ADMIN — NICOTINE POLACRILEX 4 MG: 2 GUM, CHEWING ORAL at 16:11

## 2021-02-26 RX ADMIN — NICOTINE POLACRILEX 4 MG: 2 GUM, CHEWING ORAL at 13:56

## 2021-02-26 RX ADMIN — NICOTINE POLACRILEX 4 MG: 2 GUM, CHEWING ORAL at 09:55

## 2021-02-26 RX ADMIN — NICOTINE POLACRILEX 4 MG: 2 GUM, CHEWING ORAL at 11:33

## 2021-02-26 RX ADMIN — FLUTICASONE FUROATE 1 PUFF: 100 POWDER RESPIRATORY (INHALATION) at 08:19

## 2021-02-26 RX ADMIN — NICOTINE POLACRILEX 4 MG: 2 GUM, CHEWING ORAL at 14:57

## 2021-02-26 RX ADMIN — HYDROXYZINE HYDROCHLORIDE 50 MG: 25 TABLET, FILM COATED ORAL at 21:12

## 2021-02-26 RX ADMIN — OLANZAPINE 15 MG: 10 TABLET, ORALLY DISINTEGRATING ORAL at 08:18

## 2021-02-26 RX ADMIN — LEVONORGESTREL AND ETHINYL ESTRADIOL 1 TABLET: KIT at 08:19

## 2021-02-26 RX ADMIN — PROPRANOLOL HYDROCHLORIDE 20 MG: 20 TABLET ORAL at 19:09

## 2021-02-26 RX ADMIN — MELATONIN TAB 3 MG 3 MG: 3 TAB at 19:10

## 2021-02-26 RX ADMIN — PROPRANOLOL HYDROCHLORIDE 20 MG: 20 TABLET ORAL at 13:56

## 2021-02-26 RX ADMIN — BENZTROPINE MESYLATE 1 MG: 1 TABLET ORAL at 08:18

## 2021-02-26 ASSESSMENT — ACTIVITIES OF DAILY LIVING (ADL)
HYGIENE/GROOMING: INDEPENDENT
ORAL_HYGIENE: INDEPENDENT
DRESS: INDEPENDENT
LAUNDRY: WITH SUPERVISION

## 2021-02-26 NOTE — PROGRESS NOTES
[facility], [place]   Psychiatric Progress Note  Hospital Day: 39        Interim History:   The patient's care was discussed with the treatment team during the daily team meeting and/or staff's chart notes were reviewed. Staff report: calm evening and morning. Was napping off and on in am. Adherent to medications. [facility] talked to the [facility], see [facility]'s note below:    Patient committed MICD to [facility] ([facility]) through [facility] at [place] ([facility]). Called [facility] and spoke to Staff Lena who reported they are no longer allowed to give out any timeline for when a patient may get in because it constantly changes on a daily basis.  She said that IV drug users and pregnant women, revoked PD patients all do slide in ahead of the general list for CARE programs.     Interview: was seen today in her room. Alicia's only question today was about her discharge date. I suggested her to talk to [facility] who, in turn, promised, to talk to [facility]. She still complained of restlessness, but admitted, that, overall, she had been pacing less frequently. Alicia denied having Suicidal ideation, Homicidal thoughts, Auditory hallucinations, Visual hallucinations and had no further questions or concerns.           Medications:       benztropine  1 mg Oral BID     fluticasone  1 puff Inhalation Daily     lamoTRIgine  75 mg Oral Daily     levonorgestrel-ethinyl estradiol  1 tablet Oral Daily     metFORMIN  500 mg Oral BID w/meals     OLANZapine zydis  15 mg Oral BID    Or     OLANZapine  10 mg Intramuscular BID     propranolol  20 mg Oral TID          Allergies:     Allergies   Allergen Reactions     Nkda [No Known Drug Allergies]           Labs:     No results found for this or any previous visit (from the past 48 hour(s)).       Psychiatric Examination:     /65   Pulse 87   Temp 97.6  F (36.4  C) (Oral)   Resp 16   Wt 65.8 kg (145 lb 1.6 oz)    "SpO2 98%   BMI 22.73 kg/m    Weight is 145 lbs 1.6 oz  Body mass index is 22.73 kg/m .     Orthostatic Vitals       Most Recent      Sitting Orthostatic /65 02/26 0814    Sitting Orthostatic Pulse (bpm) 85 02/26 0814    Standing Orthostatic /68 02/26 0814    Standing Orthostatic Pulse (bpm) 92 02/26 0814         Appearance: awake, alert, adequately groomed, alert.   Eye Contact: fair  Mood:  \"fine\"  Affect: calm, mood appropriate   Speech:  clear, coherent and normal prosody,   Language: fluent and intact in English  Psychomotor, Gait, Musculoskeletal:  no evidence of tardive dyskinesia, dystonia, or tics  Thought Process: organized  Associations:  no loose associations  Thought Content:  No evidence of SI/HI/paranoia, no overt delusions expressed today.  Insight:  limited, possibly, improving?   Judgement:  limited, improving?  Oriented to:  time, person, and place  Attention Span and Concentration:  fair  Recent and Remote Memory:  improving,   Fund of Knowledge:  appropriate    Clinical Global Impressions  First:  Considering your total clinical experience with this particular patient population, how severe are the patient's symptoms at this time?: 6 (01/21/21 1437)  Compared to the patient's condition at the START of treatment, this patient's condition is: 3 (01/21/21 1437)  Most recent:  Considering your total clinical experience with this particular patient population, how severe are the patient's symptoms at this time?: 5 (2/22/2021)  Compared to the patient's condition at the START of treatment, this patient's condition is: 3 (2/22/2021)         Precautions:     Behavioral Orders   Procedures     Assault precautions     Code 1 - Restrict to Unit     Code 1 - Restrict to Unit     Elopement precautions     Fall precautions     Routine Programming     As clinically indicated     Status 15     Every 15 minutes.          Diagnoses:     Bipolar I Disorder, current episode manic, resolving  Amphetamine " use disorder, severe  Cannabis use disorder  Akathisia   Foot pain/dry skin         Assessment & Plan:     Assessment and hospital summary:  This patient is a 25 year old woman with history of bipolar one disorder and substance use use (methamphetamine, alcohol, cocaine, heroin, cannabis) who presented to ED via EMS with agitation and aggression in context of methamphetamine and heroin use and likely medication nonadherence. Her symptoms of responding to internal stimuli, aggression, and disorganization are consistent with a manic episode with psychotic features with potential effects of amphetamine intoxication and opioid withdrawal. She has a history of multiple similar presentations in the ED recently but her symptoms have resolved more quickly, suggesting that her symptoms this time may be more due to her bipolar disorder than substance-induced. Alicia has a history of multiple similar presentations at prior hospitalizations. She has done well in these hospitalizations with an antipsychotic and sometimes a mood stabilizer. She has also required commitment and Carrillo for several of these hospitalizations. Therefore, based on her history and current presentation, she would likely benefit from a mood stabilizer and an antipsychotic to target her manic symptoms, agitation, and psychosis.      Alicia continued to have agitation after admission and declined Depakote. Given her imminent risk to herself and others and poor insight into her mental health, a petition for commitment and Carrillo were sent. As she was declining Depakote and requesting PTA Lamictal, Depakote was stopped and Lamictal initiated. Discussed need for adherence and that Lamictal is not effective in jackie prevention. Alicia began taking Zyprexa 5mg TID two days after admission. Gabapentin PRN was started to target opioid withdrawal as she had low-normal blood pressures and this would therefore be preferred over clonidine for symptoms management.  "     On 1/25, patient postured toward staff in an aggressive manner. She required seclusion and emergency neuroleptic medications. Psychiatric emergency was declared on 1/26, and Zyprexa was increased, and backed up with IM. Propranolol was also added to target anxiety and suspected akathisia. On 1/28 patient was threatening staff and wanting to leave room to \"attack\" staff, was not redirectable, required use of seclusion. She again required seclusion on 2/3 after becoming agitated and threatening to kill staff.  Psychiatric emergency declared 1/26 as patient postured toward staff with the intent to assault them on 1/25.  Pt had another episode of seclusion on 1/28.  Team pursued commitment with ector, which was granted by [facility] starting 2/11/21.    At this time, patient is showing limited improvement on olanzapine daily olanzapine dose of 30-35mg/day. However, she is unwilling to consider any alternative neuroleptic medications or non-neuroleptic mood stabilizers. She is not interested in Depakote or lithium because of weight gain. She becomes extremely agitated if we discuss medication options and abruptly terminates interview. We are considering cross titrating to risperidone as mother reports she did well on risperidone in the past, however at this time Alicia is extremely opposed to a medication change. Patient did mention she prefers Abilify to other antipsychotics, so we will keep this in consideration. As she is beginning to demonstrate some subtle improvements (participated in group for 15min, less aggression and hostility towards staff), we will continue maximum olanzapine dose for now.     She does seem to be experiencing significant restlessness, possibly as a side effect of olanzapine. Per her chart, she tried benztropine over 5 years ago. Will address akathisia with Prn medication for now as olanzapine is helping with jackie, psychosis and agitation.     Psychiatric " treatment/inteventions:    Today's changes  -None      Medications:   # Bipolar I Disorder, current manic episode  - Lamictal 50mg daily (initiated 25mg on 1/20 per pt request, and increased to 50mg on 2/5). She understands that this is not first line treatment for jackie.   -- increased to 75mg on 2/19   - Zyprexa 15 mg BID PO or 10mg IM (most recently increased on 2/5). She has Carrillo that includes olanzapine and she may not decline medication.   -continue to discuss recommendation to start non-neuroleptic mood stabilizer outside of lamotrigine that would be first line for jackie, though patient has consistently declined due to concerns about weight gain.     #akathisia, appears to be improving with less pacing  - continue benztropine to 1 mg BID  -Continue propranolol 20 mg TID to target akathisia and anxiety    PRNs  Akathisia  Benztropine    anxiety  -continue hydroxyzine 25-50 mg q4h prn for acute anxiety - first line  - clonidine 0.1mg BID PRN or anxiety - 2nd line for anxiety   - Gabapentin 300mg TID PRN - 3rd line for anxiety     insomnia  -continue melatonin PRN insomnia    Agitation:   -continue Zyprexa 5mg tid prn for severe agitation  -continue Haldol 5, + 50 Benadryl PRN severe agitation/aggression  - lorazepam PRN discontinued 2/11     Laboratory/Imaging: Ordered repeat COVID per protocol given pt has been hospitalized >1 week      Patient will be treated in therapeutic milieu with appropriate individual and group therapies as described.     Medical treatment/interventions:  Medical concerns:   #headache  -acetaminophen 650mg q4h PRN     # UTI, resolved:  -pt denying any further urinary symptoms   - completed treatment with Bactrim      # Migraines:  -continue PTA sumatriptan PRN     #Opioid Withdrawal, appears resolved:   -continue Gabapentin 300mg TID PRN opioid withdrawal symptoms  -continue PRN clonidine 0.1mg BID, Imodium, Zofran, Tylenol  -Discontinued COWS 1/22 given absence of significant  withdrawal    #Dry Feet  -ordered PRN Eucerin cream, continue to monitor       Disposition Plan   Reason for ongoing admission: poses an imminent risk to self, poses an imminent risk to others and is unable to care for self due to severe psychosis or jackie  Discharge location: pursuing CARE given pts poor insight/judgement and severity of symptoms. Patient is on a waiting list at [facility], See discussion above.   Discharge Medications: not ordered  Follow-up Appointments: not scheduled  Legal Status: full commitment with [facility]illo for aripiprazole, risperidone, olanzapine, clozapine, haloperidol.     Willy Yi MD  [facility]

## 2021-02-26 NOTE — PLAN OF CARE
Patient had uneventful day. She has no goal for this shift. She came out for her medications and meals. She napped on and off in her room. Denies all MH symptoms. She denies pain. Slept okay last night. Next step towards her discharge is to go to CARE program once an opening comes up.

## 2021-02-26 NOTE — PLAN OF CARE
Problem: OT General Care Plan  Goal: OT Goal 1  Description: Pt will demonstrate consistent engagement in OT group activities that support recovery as evidenced by participating in >1 scheduled OT group/day for 5 days.     OT Groups Attended: Clinic     Affect/Hygiene/Presentation: Calm/pleasant, engaged, blunted affect. No apparent hygiene concerns.     Patient Performance/Response: Pt actively participated in occupational therapy clinic. Pt was able to ask for assistance as needed, and independently initiate a novel, goal-directed task with assistance for task setup and organization. Pt demonstrated good focus, planning, and attention to detail during task completion. Pt appeared comfortable interacting with peers and writer, and verbalized feeling proud of her completed project.     Goal Progress: GOAL REVIEW: care plan reviewed, plan/goal adjustment made to allow for further progress and additional opportunities for skill acquisition.     Plan: Pt will be encouraged to maintain group attendance for continued ongoing assessment and support in completion of occupational therapy treatment goals.     Outcome: Improving

## 2021-02-26 NOTE — PLAN OF CARE
Patient has been calm this shift. She was presnet in the milieu at the beginning of the shift but went to her room after dinner. She received her nighttime medication at 1900. She denies SI, SIB, HI or hallucinations. She did not report any anxiety this shift. Vitals are WNL. Patient is eating and sleeping adequately.

## 2021-02-26 NOTE — PLAN OF CARE
Work Completed:  Patient committed MICD to [facility] ([facility] through [facility] at [place] ([facility]). Called [facility] and spoke to Staff Lena who reported they are no longer allowed to give out any timeline for when a patient may get in because it constantly changes on a daily basis.  She said that IV drug users and pregnant women, revoked PD patients all do slide in ahead of the general list for CARE programs.      Discharge plan or goal:   Patient MICD Commit with [facility] in [facility]                Barriers to discharge:    Patient court-ordered to enter a CARE Program.

## 2021-02-27 PROCEDURE — 250N000013 HC RX MED GY IP 250 OP 250 PS 637: Performed by: PSYCHIATRY & NEUROLOGY

## 2021-02-27 PROCEDURE — 250N000013 HC RX MED GY IP 250 OP 250 PS 637: Performed by: STUDENT IN AN ORGANIZED HEALTH CARE EDUCATION/TRAINING PROGRAM

## 2021-02-27 PROCEDURE — H2032 ACTIVITY THERAPY, PER 15 MIN: HCPCS

## 2021-02-27 PROCEDURE — 124N000002 HC R&B MH UMMC

## 2021-02-27 RX ADMIN — BENZTROPINE MESYLATE 1 MG: 1 TABLET ORAL at 18:58

## 2021-02-27 RX ADMIN — OLANZAPINE 15 MG: 10 TABLET, ORALLY DISINTEGRATING ORAL at 18:59

## 2021-02-27 RX ADMIN — LAMOTRIGINE 75 MG: 25 TABLET ORAL at 07:59

## 2021-02-27 RX ADMIN — METFORMIN HYDROCHLORIDE 500 MG: 500 TABLET, FILM COATED ORAL at 07:59

## 2021-02-27 RX ADMIN — METFORMIN HYDROCHLORIDE 500 MG: 500 TABLET, FILM COATED ORAL at 17:48

## 2021-02-27 RX ADMIN — NICOTINE POLACRILEX 4 MG: 2 GUM, CHEWING ORAL at 08:00

## 2021-02-27 RX ADMIN — BENZTROPINE MESYLATE 1 MG: 1 TABLET ORAL at 07:59

## 2021-02-27 RX ADMIN — FLUTICASONE FUROATE 1 PUFF: 100 POWDER RESPIRATORY (INHALATION) at 08:00

## 2021-02-27 RX ADMIN — NICOTINE POLACRILEX 4 MG: 2 GUM, CHEWING ORAL at 10:44

## 2021-02-27 RX ADMIN — NICOTINE POLACRILEX 4 MG: 2 GUM, CHEWING ORAL at 12:40

## 2021-02-27 RX ADMIN — LEVONORGESTREL AND ETHINYL ESTRADIOL 1 TABLET: KIT at 08:01

## 2021-02-27 RX ADMIN — NICOTINE POLACRILEX 4 MG: 2 GUM, CHEWING ORAL at 18:15

## 2021-02-27 RX ADMIN — OLANZAPINE 15 MG: 10 TABLET, ORALLY DISINTEGRATING ORAL at 07:59

## 2021-02-27 RX ADMIN — PROPRANOLOL HYDROCHLORIDE 20 MG: 20 TABLET ORAL at 07:59

## 2021-02-27 RX ADMIN — NICOTINE POLACRILEX 4 MG: 2 GUM, CHEWING ORAL at 17:06

## 2021-02-27 RX ADMIN — PROPRANOLOL HYDROCHLORIDE 20 MG: 20 TABLET ORAL at 18:59

## 2021-02-27 RX ADMIN — NICOTINE POLACRILEX 4 MG: 2 GUM, CHEWING ORAL at 16:01

## 2021-02-27 ASSESSMENT — ACTIVITIES OF DAILY LIVING (ADL)
ORAL_HYGIENE: INDEPENDENT
HYGIENE/GROOMING: INDEPENDENT
DRESS: SCRUBS (BEHAVIORAL HEALTH)
LAUNDRY: WITH SUPERVISION

## 2021-02-27 NOTE — PLAN OF CARE
Patient has been present in the milieu. She is either trying to watch TV or pacing the halls. She ate dinner and was ready for her night time medication at 1900. Patients vitals are WNL. Patient denies SI, SIB, HI or hallucinations. She came out at about 2115 stating that she was restless and she was administered PRN medication. RN will continue to monitor.

## 2021-02-27 NOTE — PROGRESS NOTES
02/26/21 2200   Groups   Details    (Psychotherapy)   Number of patients attending the group:  5  Group Length:  .5 Hours     Group Therapy Type: Psychotherapy     Summary of Group / Topics Discussed:      The  Psychotherapy group goal is to promote insight to positive choice and change. Group processing is within a supportive and safe environment. Patients will process emotions using verbal group and expressive psychotherapy interventions including visual art/writing interventions.     Group interventions support patients by: mindfulness practices       Subjective -mood -ok     Objective/ Intervention- Goal of group and Therapeutic modality utilized-Learn and practice mindfulness practices     Group Response-  engaged     Patient Response- Pt was pleasant, cooperative and engaged for a short time. She was restless. She  completed her project very quickly and was unable to sit still for the rest of group.   She made a thoughtful mandala with a short prayer      Joel Locke, NALLELY, ATR-BC

## 2021-02-27 NOTE — PLAN OF CARE
Shift Summary  Chief Complaint: Psychotic, combative   Target Symptoms/Condition Status: Improved   Psych  Pt presents polite, calm and cooperative. Visible in milieu ,but isolative and withdrawn to self. Pacing in hallway with headphone on. Took long naps between meals. Denies any suicidal ideation, homicidal ideation, Self injury behavior, auditory or visual hallucination, racing thought, depression and anxiety. No evidence of psychosis, paranoid, delusional thoughts or disorganized behavior.   Prn: nicotine gums  Medical  Pt alert and oriented x 3. Denies pain. Vital sings WNL (see flow sheet foe details). Afternoon Propranolol was hod due to low BP (84/53, pulse 76). Pt denies any signs of symptoms of low BP. Slept 6.5 hours last night. Good medication compliance is noted. Seems tolerating medications well. No side effect reported by pt or noted by this writer. Appetite adequate. No problem with bowel and bladder.   Prn: none  Continue to monitor pt's status Q 15 minutes and stabilize the patient's symptoms with the use of medications and therapeutic programming.

## 2021-02-27 NOTE — PROGRESS NOTES
NOC Shift Report     Pt in bed at beginning of shift, breathing quiet and unlabored. Pt slept through shift. Pt slept 6.5 hours.      No pt complaints or concerns at this time.      No PRNs given. Will continue to monitor.

## 2021-02-28 PROCEDURE — 250N000013 HC RX MED GY IP 250 OP 250 PS 637: Performed by: PSYCHIATRY & NEUROLOGY

## 2021-02-28 PROCEDURE — 250N000013 HC RX MED GY IP 250 OP 250 PS 637: Performed by: STUDENT IN AN ORGANIZED HEALTH CARE EDUCATION/TRAINING PROGRAM

## 2021-02-28 PROCEDURE — 124N000002 HC R&B MH UMMC

## 2021-02-28 RX ADMIN — NICOTINE POLACRILEX 4 MG: 2 GUM, CHEWING ORAL at 12:45

## 2021-02-28 RX ADMIN — LAMOTRIGINE 75 MG: 25 TABLET ORAL at 10:47

## 2021-02-28 RX ADMIN — NICOTINE POLACRILEX 4 MG: 2 GUM, CHEWING ORAL at 10:49

## 2021-02-28 RX ADMIN — FLUTICASONE FUROATE 1 PUFF: 100 POWDER RESPIRATORY (INHALATION) at 10:49

## 2021-02-28 RX ADMIN — BENZTROPINE MESYLATE 1 MG: 1 TABLET ORAL at 10:47

## 2021-02-28 RX ADMIN — PROPRANOLOL HYDROCHLORIDE 20 MG: 20 TABLET ORAL at 10:48

## 2021-02-28 RX ADMIN — NICOTINE POLACRILEX 4 MG: 2 GUM, CHEWING ORAL at 18:19

## 2021-02-28 RX ADMIN — NICOTINE POLACRILEX 4 MG: 2 GUM, CHEWING ORAL at 14:59

## 2021-02-28 RX ADMIN — LEVONORGESTREL AND ETHINYL ESTRADIOL 1 TABLET: KIT at 10:50

## 2021-02-28 RX ADMIN — METFORMIN HYDROCHLORIDE 500 MG: 500 TABLET, FILM COATED ORAL at 17:56

## 2021-02-28 RX ADMIN — BENZTROPINE MESYLATE 1 MG: 1 TABLET ORAL at 19:06

## 2021-02-28 RX ADMIN — OLANZAPINE 15 MG: 10 TABLET, ORALLY DISINTEGRATING ORAL at 10:48

## 2021-02-28 RX ADMIN — PROPRANOLOL HYDROCHLORIDE 20 MG: 20 TABLET ORAL at 14:36

## 2021-02-28 RX ADMIN — NICOTINE POLACRILEX 4 MG: 2 GUM, CHEWING ORAL at 16:38

## 2021-02-28 RX ADMIN — NICOTINE POLACRILEX 4 MG: 2 GUM, CHEWING ORAL at 13:46

## 2021-02-28 RX ADMIN — PROPRANOLOL HYDROCHLORIDE 20 MG: 20 TABLET ORAL at 19:06

## 2021-02-28 RX ADMIN — OLANZAPINE 15 MG: 10 TABLET, ORALLY DISINTEGRATING ORAL at 19:06

## 2021-02-28 RX ADMIN — METFORMIN HYDROCHLORIDE 500 MG: 500 TABLET, FILM COATED ORAL at 10:48

## 2021-02-28 ASSESSMENT — ACTIVITIES OF DAILY LIVING (ADL)
HYGIENE/GROOMING: INDEPENDENT
DRESS: SCRUBS (BEHAVIORAL HEALTH)
ORAL_HYGIENE: INDEPENDENT
LAUNDRY: WITH SUPERVISION

## 2021-02-28 NOTE — PLAN OF CARE
Pt actively participated in a structured Therapeutic Recreation group with a focus on leisure participation, stress reduction, and social engagement via an active group game. Pt remained a focused and engaged participant throughout full duration of group. Pt was very active in the tossing large, foam dice for the activity. Pt stated she enjoyed the group after the game was completed.

## 2021-02-28 NOTE — PLAN OF CARE
Shift Summary  Chief Complaint: Psychotic, combative   Target Symptoms/Condition Status: Improved  Psych  Present in milieu for short period of time and for meals. Isolative and withdrawn to self. No evidence of psychosis, paranoid, delusional thoughts or disorganized behavior. Denies any suicidal ideation, homicidal ideation, Self injury behavior, hallucination or racing thought. Denies being depressed or anxious. Pt mostly slept  between meals. Encouraged to stay in milieu more.   Prn: none  Medical  Pt alert and oriented x 3. Denies pain. Vital sings WNL (see flow sheet foe details). Slept 6.5 hours last night. Good medication compliance is noted. Seems tolerating medications well. Still restless and pacing. Already on Cogentin and Propranolol. Appetite adequate. No problem with bowel and bladder.   Prn: none  Continue to monitor pt's status Q 15 minutes and stabilize the patient's symptoms with the use of medications and therapeutic programming.

## 2021-02-28 NOTE — PLAN OF CARE
Patient has been much calmer this shift though she still appears restless when in the milieu. She denies SI, SIB, HI, hallucinations. Her BP was 110/70 at 1900. She is eating adequately and is medication compliant. Patient went to bed at about 1930.

## 2021-03-01 PROCEDURE — 250N000013 HC RX MED GY IP 250 OP 250 PS 637: Performed by: PSYCHIATRY & NEUROLOGY

## 2021-03-01 PROCEDURE — 124N000002 HC R&B MH UMMC

## 2021-03-01 PROCEDURE — 250N000013 HC RX MED GY IP 250 OP 250 PS 637: Performed by: STUDENT IN AN ORGANIZED HEALTH CARE EDUCATION/TRAINING PROGRAM

## 2021-03-01 PROCEDURE — 99232 SBSQ HOSP IP/OBS MODERATE 35: CPT | Mod: 95 | Performed by: PSYCHIATRY & NEUROLOGY

## 2021-03-01 RX ADMIN — FLUTICASONE FUROATE 1 PUFF: 100 POWDER RESPIRATORY (INHALATION) at 10:09

## 2021-03-01 RX ADMIN — NICOTINE POLACRILEX 4 MG: 2 GUM, CHEWING ORAL at 17:46

## 2021-03-01 RX ADMIN — OLANZAPINE 15 MG: 10 TABLET, ORALLY DISINTEGRATING ORAL at 20:04

## 2021-03-01 RX ADMIN — NICOTINE POLACRILEX 4 MG: 2 GUM, CHEWING ORAL at 14:33

## 2021-03-01 RX ADMIN — NICOTINE POLACRILEX 2 MG: 2 GUM, CHEWING ORAL at 16:24

## 2021-03-01 RX ADMIN — LAMOTRIGINE 75 MG: 25 TABLET ORAL at 10:08

## 2021-03-01 RX ADMIN — OLANZAPINE 15 MG: 10 TABLET, ORALLY DISINTEGRATING ORAL at 10:08

## 2021-03-01 RX ADMIN — METFORMIN HYDROCHLORIDE 500 MG: 500 TABLET, FILM COATED ORAL at 10:09

## 2021-03-01 RX ADMIN — BENZTROPINE MESYLATE 1 MG: 1 TABLET ORAL at 10:08

## 2021-03-01 RX ADMIN — BENZTROPINE MESYLATE 0.5 MG: 0.5 TABLET ORAL at 02:36

## 2021-03-01 RX ADMIN — NICOTINE POLACRILEX 4 MG: 2 GUM, CHEWING ORAL at 10:09

## 2021-03-01 RX ADMIN — METFORMIN HYDROCHLORIDE 500 MG: 500 TABLET, FILM COATED ORAL at 17:44

## 2021-03-01 RX ADMIN — BENZTROPINE MESYLATE 1 MG: 1 TABLET ORAL at 20:04

## 2021-03-01 RX ADMIN — PROPRANOLOL HYDROCHLORIDE 20 MG: 20 TABLET ORAL at 10:11

## 2021-03-01 RX ADMIN — LEVONORGESTREL AND ETHINYL ESTRADIOL 1 TABLET: KIT at 10:10

## 2021-03-01 RX ADMIN — NICOTINE POLACRILEX 4 MG: 2 GUM, CHEWING ORAL at 12:55

## 2021-03-01 RX ADMIN — NICOTINE POLACRILEX 4 MG: 2 GUM, CHEWING ORAL at 11:30

## 2021-03-01 RX ADMIN — PROPRANOLOL HYDROCHLORIDE 20 MG: 20 TABLET ORAL at 13:32

## 2021-03-01 RX ADMIN — PROPRANOLOL HYDROCHLORIDE 20 MG: 20 TABLET ORAL at 20:04

## 2021-03-01 ASSESSMENT — ACTIVITIES OF DAILY LIVING (ADL)
HYGIENE/GROOMING: INDEPENDENT
ORAL_HYGIENE: INDEPENDENT
LAUNDRY: WITH SUPERVISION
DRESS: SCRUBS (BEHAVIORAL HEALTH)

## 2021-03-01 NOTE — PLAN OF CARE
Shift Summary  Chief Complaint: Psychotic, combative  Target Symptoms/Condition Status: Improved  Psych  Polite, restless and cooperative. Took morning medication with no difficulties. Awaiting for bed at Care program. Visible in milieu ,but isolative and withdrawn to self. Denies any suicidal ideation, homicidal ideation, Self injury behavior, hallucination, racing thought, depression and anxiety. Did not participate in any activity groups.   Prn: nicotine gumes  Medical  Pt alert and oriented x 3. Denies pain. BP on low side ,but consistent with previous BP readings (see flow sheet foe details). Denies any sings or symptoms of low BP. Slept 5.75 hours last night. Good medication compliance is noted. Seems tolerating medications well. Appetite adequate. No problem with bowel and bladder.   Prn: none  Continue to monitor pt's status Q 15 minutes and stabilize the patient's symptoms with the use of medications and therapeutic programming.

## 2021-03-01 NOTE — PLAN OF CARE
Patient has been calm this shift. She was in the lounge earlier during the shift but was in bed by 1915. She watched TV for a while then had dinner at 1800. She hung out in the lounge till about 1855 then requested her medications. Vitals were WNL. She denies any mental health symptoms. However, she admits to anxiety as related to not knowing a plan for discharge. RN will continue to monitor.

## 2021-03-01 NOTE — PLAN OF CARE
Work Completed:  Met with Alicia who remains anxious waiting for a bed at a CARE Program.  She is Committed MICD with [facility].  Cooperative with all care. Asks daily about the wait-list.  Told her today that when I called [facility] last Friday about the wait list, they have now been instructed not to give out any tentative number of weeks.  She understands that this predicament has nothing to do with our hospital or staff but remains frustrated by the wait.     Discharge plan or goal:   Patient will be transferred by the  to a CARE Program.                Barriers to discharge:    Waiting for placement at CARE.

## 2021-03-01 NOTE — PLAN OF CARE
Patient was awake at about 0200. She requested snacks and some PRN medication. She went back to sleep after receiving medication. Patient had no behavioral concerns during the night.

## 2021-03-01 NOTE — PROGRESS NOTES
[facility], [place]   Psychiatric Progress Note      Video-Visit Details    Type of service:  Video Visit    Video Start Time (time video started): 1202    Video End Time (time video stopped): 1210    Originating Location (pt. Location): psychiatric inpatient    Distant Location (provider location): Provider remote location    Mode of Communication:  Video Conference via [employer]    Physician has received verbal consent for a Video Visit from the patient? Yes    Interim History:  The patient's care was discussed with the treatment team during the daily team meeting and/or staff's chart notes were reviewed.  Overnight staff report Patient has been calm this shift. She was in the lounge earlier during the shift but was in bed by 1915. She watched TV for a while then had dinner at 1800. She hung out in the lounge till about 1855 then requested her medications. Vitals were WNL. She denies any mental health symptoms. However, she admits to anxiety as related to not knowing a plan for discharge.     States she is frustrated about long wait time.  States she has been treated quite a few times and committed 4 times.  States that being here is not conducive to sobriety. States she does not want to take Zyprexa b/c it causes weight gain.     Physical ROS:  The patient endorsed no side effects from medications except as above.. The remainder of 10-point review of systems was negative except as noted in Interim History.    Patient Active Problem List   Diagnosis     Polysubstance abuse (H)     Tobacco abuse     Generalized anxiety disorder     Bipolar depression (H)     Migraines     Dysmenorrhea     CARDIOVASCULAR SCREENING; LDL GOAL LESS THAN 160     Insomnia     Paranoia (psychosis) (H)     MENTAL HEALTH     Carley (H)     Agitation     Aggression       Medications:    benztropine  1 mg Oral BID     fluticasone  1 puff Inhalation Daily     lamoTRIgine  75 mg Oral Daily     levonorgestrel-ethinyl  estradiol  1 tablet Oral Daily     metFORMIN  500 mg Oral BID w/meals     OLANZapine zydis  15 mg Oral BID    Or     OLANZapine  10 mg Intramuscular BID     propranolol  20 mg Oral TID      Allergies:  Allergies   Allergen Reactions     Nkda [No Known Drug Allergies]       Labs:  No results found for this or any previous visit (from the past 24 hour(s)).    Psychiatric Examination:    /74   Pulse 84   Temp 97.2  F (36.2  C) (Oral)   Resp 16   Wt 65.8 kg (145 lb 1.6 oz)   SpO2 99%   BMI 22.73 kg/m    Weight is 145 lbs 1.6 oz  Body mass index is 22.73 kg/m .  Orthostatic Vitals       Most Recent      Sitting Orthostatic BP 99/52 03/01 0839    Sitting Orthostatic Pulse (bpm) 88 03/01 0839    Standing Orthostatic /60 03/01 0839    Standing Orthostatic Pulse (bpm) 90 03/01 0839            Appearance: dressed in scrubs  General behavior: Cooperative  Eye Contact: Fair   Gait and Motor Coordination: Normal gait and motor coordination  Speech: WNL  Language: Normal  Attention/Concentration: Fair  Orientation: oriented to time, place and person  Thought Process:  linear  Thought Content:  no evidence of suicidal ideation or homicidal ideation and no evidence of psychotic thought  Recent and Remote Memory:  impaired  Associations: no loose associations  Mood: irritable  Affect: restricted  Fund of knowledge: appropriate to education and experiences  Demonstration of insight/judgment: poor  Suicidal risk: Low      Clinical Global Impressions  First:  Considering your total clinical experience with this particular patient population, how severe are the patient's symptoms at this time?: 6 (01/21/21 1437)  Compared to the patient's condition at the START of treatment, this patient's condition is: 3 (01/21/21 1437)  Most recent:  Considering your total clinical experience with this particular patient population, how severe are the patient's symptoms at this time?: 2 (02/16/21 1511)  Compared to the patient's  condition at the START of treatment, this patient's condition is: 2 (02/16/21 1511)    Precautions:  Behavioral Orders   Procedures     Assault precautions     Code 1 - Restrict to Unit     Code 1 - Restrict to Unit     Elopement precautions     Fall precautions     Routine Programming     As clinically indicated     Status 15     Every 15 minutes.       Diagnoses:  Bipolar 1 Disorder, most recent episode manic  Amphetamine Use Disorder  Opioid Use Disorder    Assessment/Plan:    24 yo female with bipolar disorder and severe substance use problem.  Stabilized on Lamictal and Zyprexa.   Poor insight.     1. Continue hospitalization for safety and stabilization.  2. Continue Zyprexa and Lamictal  3. Awaiting placement to CARE  4. Expect 7 - 15 days    Reason for ongoing admission: is unable to care for self due to severe psychosis or jackie  Discharge location: Chemical dependency treatment facility  Discharge Medications: not ordered  Follow-up Appointments: not scheduled  Legal Status: full commitment and Carrillo    Entered by: Prabhjot Proctor MD on 03/01/2021 at 12:01 PM

## 2021-03-02 PROCEDURE — 124N000002 HC R&B MH UMMC

## 2021-03-02 PROCEDURE — 250N000013 HC RX MED GY IP 250 OP 250 PS 637: Performed by: PSYCHIATRY & NEUROLOGY

## 2021-03-02 PROCEDURE — 250N000013 HC RX MED GY IP 250 OP 250 PS 637: Performed by: STUDENT IN AN ORGANIZED HEALTH CARE EDUCATION/TRAINING PROGRAM

## 2021-03-02 PROCEDURE — 99232 SBSQ HOSP IP/OBS MODERATE 35: CPT | Mod: 95 | Performed by: PSYCHIATRY & NEUROLOGY

## 2021-03-02 RX ADMIN — NICOTINE POLACRILEX 4 MG: 2 GUM, CHEWING ORAL at 14:51

## 2021-03-02 RX ADMIN — NICOTINE POLACRILEX 4 MG: 2 GUM, CHEWING ORAL at 18:55

## 2021-03-02 RX ADMIN — METFORMIN HYDROCHLORIDE 500 MG: 500 TABLET, FILM COATED ORAL at 17:28

## 2021-03-02 RX ADMIN — LAMOTRIGINE 75 MG: 25 TABLET ORAL at 09:11

## 2021-03-02 RX ADMIN — NICOTINE POLACRILEX 4 MG: 2 GUM, CHEWING ORAL at 16:23

## 2021-03-02 RX ADMIN — NICOTINE POLACRILEX 4 MG: 2 GUM, CHEWING ORAL at 17:28

## 2021-03-02 RX ADMIN — NICOTINE POLACRILEX 4 MG: 2 GUM, CHEWING ORAL at 14:06

## 2021-03-02 RX ADMIN — BENZTROPINE MESYLATE 1 MG: 1 TABLET ORAL at 09:11

## 2021-03-02 RX ADMIN — LEVONORGESTREL AND ETHINYL ESTRADIOL 1 TABLET: KIT at 09:11

## 2021-03-02 RX ADMIN — PROPRANOLOL HYDROCHLORIDE 20 MG: 20 TABLET ORAL at 19:01

## 2021-03-02 RX ADMIN — FLUTICASONE FUROATE 1 PUFF: 100 POWDER RESPIRATORY (INHALATION) at 09:11

## 2021-03-02 RX ADMIN — BENZTROPINE MESYLATE 1 MG: 1 TABLET ORAL at 19:01

## 2021-03-02 RX ADMIN — OLANZAPINE 15 MG: 10 TABLET, ORALLY DISINTEGRATING ORAL at 19:01

## 2021-03-02 RX ADMIN — NICOTINE POLACRILEX 4 MG: 2 GUM, CHEWING ORAL at 09:11

## 2021-03-02 RX ADMIN — OLANZAPINE 15 MG: 10 TABLET, ORALLY DISINTEGRATING ORAL at 09:10

## 2021-03-02 RX ADMIN — NICOTINE POLACRILEX 4 MG: 2 GUM, CHEWING ORAL at 10:22

## 2021-03-02 RX ADMIN — PROPRANOLOL HYDROCHLORIDE 20 MG: 20 TABLET ORAL at 14:09

## 2021-03-02 RX ADMIN — METFORMIN HYDROCHLORIDE 500 MG: 500 TABLET, FILM COATED ORAL at 09:11

## 2021-03-02 ASSESSMENT — ACTIVITIES OF DAILY LIVING (ADL)
ORAL_HYGIENE: INDEPENDENT
HYGIENE/GROOMING: INDEPENDENT
LAUNDRY: UNABLE TO COMPLETE
HYGIENE/GROOMING: INDEPENDENT
DRESS: INDEPENDENT;SCRUBS (BEHAVIORAL HEALTH)
ORAL_HYGIENE: INDEPENDENT
DRESS: SCRUBS (BEHAVIORAL HEALTH)
LAUNDRY: WITH SUPERVISION

## 2021-03-02 NOTE — PROGRESS NOTES
"[facility], [place]   Psychiatric Progress Note      Video-Visit Details    Type of service:  Video Visit    Video Start Time (time video started): 1103    Video End Time (time video stopped): 1109    Originating Location (pt. Location): psychiatric inpatient    Distant Location (provider location): Provider remote location    Mode of Communication:  Video Conference via [employer]    Physician has received verbal consent for a Video Visit from the patient? Yes    Interim History:  The patient's care was discussed with the treatment team during the daily team meeting and/or staff's chart notes were reviewed.  Overnight staff report Patient has been isolative to her room this shift. She was in the milieu for the start of the shift. However, she went to her room after dinner. She denies SI, SIB, HI or hallucinations during check in. Patient is eating adequately and is medication compliant. Vitals are WNL. She did not report any anxiety this shift as related to her Discharge. She mentioned that she got to talk to her treatment team today and the plan is that she goes to treatment from the hospital.      Reports that she wants to get off Zyprexa.  Discussed other Carrillo meds, but she does not want them.   Does not want tyo discuss \"this Carrillo bullshit\" and left.    Physical ROS:  The patient endorsed no side effects from medications except as above.. The remainder of 10-point review of systems was negative except as noted in Interim History.    Patient Active Problem List   Diagnosis     Polysubstance abuse (H)     Tobacco abuse     Generalized anxiety disorder     Bipolar depression (H)     Migraines     Dysmenorrhea     CARDIOVASCULAR SCREENING; LDL GOAL LESS THAN 160     Insomnia     Paranoia (psychosis) (H)     MENTAL HEALTH     Carley (H)     Agitation     Aggression       Medications:    benztropine  1 mg Oral BID     fluticasone  1 puff Inhalation Daily     lamoTRIgine  75 mg Oral Daily "     levonorgestrel-ethinyl estradiol  1 tablet Oral Daily     metFORMIN  500 mg Oral BID w/meals     OLANZapine zydis  15 mg Oral BID    Or     OLANZapine  10 mg Intramuscular BID     propranolol  20 mg Oral TID      Allergies:  Allergies   Allergen Reactions     Nkda [No Known Drug Allergies]       Labs:  No results found for this or any previous visit (from the past 24 hour(s)).    Psychiatric Examination:    BP 95/50 (BP Location: Left arm)   Pulse 87   Temp 99.2  F (37.3  C) (Tympanic)   Resp 16   Wt 65.8 kg (145 lb 1.6 oz)   SpO2 97%   BMI 22.73 kg/m    Weight is 145 lbs 1.6 oz  Body mass index is 22.73 kg/m .  Orthostatic Vitals       Most Recent      Sitting Orthostatic BP 99/52 03/01 0839    Sitting Orthostatic Pulse (bpm) 88 03/01 0839    Standing Orthostatic /60 03/01 0839    Standing Orthostatic Pulse (bpm) 90 03/01 0839            Appearance: dressed in scrubs  General behavior: Cooperative  Eye Contact: Fair   Gait and Motor Coordination: Normal gait and motor coordination  Speech: WNL  Language: Normal  Attention/Concentration: Fair  Orientation: oriented to time, place and person  Thought Process:  linear  Thought Content:  no evidence of suicidal ideation or homicidal ideation and no evidence of psychotic thought  Recent and Remote Memory:  impaired  Associations: no loose associations  Mood: irritable  Affect: restricted  Fund of knowledge: appropriate to education and experiences  Demonstration of insight/judgment: poor  Suicidal risk: Low      Clinical Global Impressions  First:  Considering your total clinical experience with this particular patient population, how severe are the patient's symptoms at this time?: 6 (01/21/21 1437)  Compared to the patient's condition at the START of treatment, this patient's condition is: 3 (01/21/21 1437)  Most recent:  Considering your total clinical experience with this particular patient population, how severe are the patient's symptoms at this  time?: 2 (02/16/21 1511)  Compared to the patient's condition at the START of treatment, this patient's condition is: 2 (02/16/21 1511)    Precautions:  Behavioral Orders   Procedures     Assault precautions     Code 1 - Restrict to Unit     Code 1 - Restrict to Unit     Elopement precautions     Fall precautions     Routine Programming     As clinically indicated     Status 15     Every 15 minutes.       Diagnoses:  Bipolar 1 Disorder, most recent episode manic  Amphetamine Use Disorder  Opioid Use Disorder    Assessment/Plan:    24 yo female with bipolar disorder and severe substance use problem.  Stabilized on Lamictal and Zyprexa.   Poor insight.     1. Continue hospitalization for safety and stabilization.  2. Continue Zyprexa and Lamictal  3. Awaiting placement to CARE  4. Expect 7 - 15 days    Reason for ongoing admission: is unable to care for self due to severe psychosis or jackie  Discharge location: [facility] treatment facility  Discharge Medications: not ordered  Follow-up Appointments: not scheduled  Legal Status: full commitment and Carrillo (Zyprexa, Haldol, Risperidone, Abilify)    Entered by: Prabhjot Proctor MD on 03/02/2021 at 11:03 AM

## 2021-03-02 NOTE — PLAN OF CARE
Work Completed:  WR attended team and wrote new team note.   WR reviewed chart.   WR contacted [facility] to get the contact info for pt's Leatha VINES as none is listed in chart.     Discharge plan or goal: Pt will be going to CARE facility once a location and admit date are identified.                 Barriers to discharge: [facility] is not offering timeline for admit, there are no safe alternatives for pt at this time.

## 2021-03-02 NOTE — PLAN OF CARE
BEHAVIORAL TEAM DISCUSSION    Participants:  Melanie Ariza [facility] covering for Olive Workman, Jane Melendez RN    Progress: MICD commitment and Carrillo, transferred from [facility] 2/19. Waiting for CARE facility.     Anticipated length of stay: Unable to determine as [facility] will not offer a timeline,     Continued Stay Criteria/Rationale: no safe alternatives to CARE at this time     Medical/Physical: None     Precautions:   Behavioral Orders   Procedures     Assault precautions     Code 1 - Restrict to Unit     Code 1 - Restrict to Unit     Elopement precautions     Fall precautions     Routine Programming     As clinically indicated     Status 15     Every 15 minutes.     Plan: Staff to continue to encourage pt to participate in programming and take medications as prescribed. [facility] will continue to coordinate with [facility] to identify a discharge date for pt to CARE facility.     Rationale for change in precautions or plan: None

## 2021-03-02 NOTE — PLAN OF CARE
"Nursing Assessment    Recent Vitals: B/P: 95/50, T: 99.2, P: 87, R: 16     Sleep:  Hours of sleep at night: 6.5  Barriers: None    General Shift Summary  Patient has been pacing halls for most of the shift. She does not interact with peers, staff, or partake in groups. Her affect presents a blunted however calm and appropriate. Patient needs redirection when trying to talk to her as she is moving about frequently, pacing, fidgeting while standing, or seems distracted. She denies any SI/HI/AVH/SIB or depression. She voiced \"A little\" to anxiety, she did not disclose any factors. She is medication compliant but \"I want to change to something else besides Zyprexa\". She is eating well. Incite and judgement seem poor. Hygiene is fair/okay.    Patient is on a wait list for care. Plan is to continue to monitor patient status q 15 mins, assess response to medications, and maintain the patients safety.    Jane Melendez RN MSN  "

## 2021-03-02 NOTE — PROGRESS NOTES
Behavioral Health  Note    Behavioral Health  Spirituality Group Note    [address]    Name: Alicia Rangel YOB: 1995   MRN: [id number] Age: 25 year old      Patient attended -led group, which included discussion of spirituality, coping with illness and building resilience.    Patient attended group for NC hrs.    patient minimally participated, but was respectful to the group process.      Jessica Vanessa  Chaplain Resident  Pager: [phone]

## 2021-03-02 NOTE — PLAN OF CARE
Patient has been isolative to her room this shift. She was in the milieu for the start of the shift. However, she went to her room after dinner. She denies SI, SIB, HI or hallucinations during check in. Patient is eating adequately and is medication compliant. Vitals are WNL. She did not report any anxiety this shift as related to her Discharge. She mentioned that she got to talk to her treatment team today and the plan is that she goes to treatment from the hospital. RN will continue to monitor

## 2021-03-02 NOTE — PROGRESS NOTES
Pt appears to be sleeping most of the shift. No issues noted/reported. Pt on status 15 minute check.

## 2021-03-03 PROCEDURE — 250N000013 HC RX MED GY IP 250 OP 250 PS 637: Performed by: STUDENT IN AN ORGANIZED HEALTH CARE EDUCATION/TRAINING PROGRAM

## 2021-03-03 PROCEDURE — 124N000002 HC R&B MH UMMC

## 2021-03-03 PROCEDURE — 250N000013 HC RX MED GY IP 250 OP 250 PS 637: Performed by: PSYCHIATRY & NEUROLOGY

## 2021-03-03 PROCEDURE — 99232 SBSQ HOSP IP/OBS MODERATE 35: CPT | Mod: 95 | Performed by: PSYCHIATRY & NEUROLOGY

## 2021-03-03 PROCEDURE — H2032 ACTIVITY THERAPY, PER 15 MIN: HCPCS

## 2021-03-03 RX ADMIN — NICOTINE POLACRILEX 4 MG: 2 GUM, CHEWING ORAL at 09:36

## 2021-03-03 RX ADMIN — METFORMIN HYDROCHLORIDE 500 MG: 500 TABLET, FILM COATED ORAL at 17:53

## 2021-03-03 RX ADMIN — FLUTICASONE FUROATE 1 PUFF: 100 POWDER RESPIRATORY (INHALATION) at 08:26

## 2021-03-03 RX ADMIN — PROPRANOLOL HYDROCHLORIDE 20 MG: 20 TABLET ORAL at 08:25

## 2021-03-03 RX ADMIN — OLANZAPINE 15 MG: 10 TABLET, ORALLY DISINTEGRATING ORAL at 08:31

## 2021-03-03 RX ADMIN — NICOTINE POLACRILEX 4 MG: 2 GUM, CHEWING ORAL at 14:33

## 2021-03-03 RX ADMIN — BENZTROPINE MESYLATE 1 MG: 1 TABLET ORAL at 08:30

## 2021-03-03 RX ADMIN — METFORMIN HYDROCHLORIDE 500 MG: 500 TABLET, FILM COATED ORAL at 08:26

## 2021-03-03 RX ADMIN — NICOTINE POLACRILEX 4 MG: 2 GUM, CHEWING ORAL at 17:53

## 2021-03-03 RX ADMIN — PROPRANOLOL HYDROCHLORIDE 20 MG: 20 TABLET ORAL at 19:03

## 2021-03-03 RX ADMIN — PROPRANOLOL HYDROCHLORIDE 20 MG: 20 TABLET ORAL at 13:37

## 2021-03-03 RX ADMIN — LAMOTRIGINE 75 MG: 25 TABLET ORAL at 08:26

## 2021-03-03 RX ADMIN — NICOTINE POLACRILEX 4 MG: 2 GUM, CHEWING ORAL at 08:31

## 2021-03-03 RX ADMIN — NICOTINE POLACRILEX 4 MG: 2 GUM, CHEWING ORAL at 12:38

## 2021-03-03 RX ADMIN — NICOTINE POLACRILEX 4 MG: 2 GUM, CHEWING ORAL at 10:46

## 2021-03-03 RX ADMIN — LEVONORGESTREL AND ETHINYL ESTRADIOL 1 TABLET: KIT at 08:27

## 2021-03-03 RX ADMIN — BENZTROPINE MESYLATE 0.5 MG: 0.5 TABLET ORAL at 00:14

## 2021-03-03 RX ADMIN — OLANZAPINE 15 MG: 10 TABLET, ORALLY DISINTEGRATING ORAL at 19:04

## 2021-03-03 RX ADMIN — BENZTROPINE MESYLATE 1 MG: 1 TABLET ORAL at 19:04

## 2021-03-03 RX ADMIN — NICOTINE POLACRILEX 4 MG: 2 GUM, CHEWING ORAL at 16:28

## 2021-03-03 NOTE — PROGRESS NOTES
[facility], [place]   Psychiatric Progress Note      Video-Visit Details    Type of service:  Video Visit    Video Start Time (time video started): 1039    Video End Time (time video stopped): 1042    Originating Location (pt. Location): psychiatric inpatient    Distant Location (provider location): Provider remote location    Mode of Communication:  Video Conference via [employer]    Physician has received verbal consent for a Video Visit from the patient? Yes    Interim History:  The patient's care was discussed with the treatment team during the daily team meeting and/or staff's chart notes were reviewed.  Overnight staff report   -Anxiety: Pt reports that not knowing her next steps, in terms of discharge, causes her anxiety.   -Depression: Pt has a flat/blunted affect that brightens politely on approach. She denies depression.   -SI/SIB: Pt denies thoughts to hurt self, thoughts of death, and thoughts of suicide.   -HI: Pt denies.   -Hallucinations: Pt denies.   -Behaviors: Pt has been appropriately social with peers and staff this evening. She has been pacing the halls and reports she still experiences restless legs.  Medical/physical:   Sleep: Pt reports she is sleeping well.   Appetite: Pt has a big appetite and denies concerns r/t eating.   Toileting: Pt reports her last BM was earlier today. She denies constipation concerns at this time.   ADLs: Pt is independent with ADLs. She took a shower this evening and changed her linen independently.   Slept 5.25 hours.    Has no complaints, states she does not want to take Zyprexa, but does not want to switch.      Physical ROS:  The patient endorsed no side effects from medications except as above.. The remainder of 10-point review of systems was negative except as noted in Interim History.    Patient Active Problem List   Diagnosis     Polysubstance abuse (H)     Tobacco abuse     Generalized anxiety disorder     Bipolar depression (H)      Migraines     Dysmenorrhea     CARDIOVASCULAR SCREENING; LDL GOAL LESS THAN 160     Insomnia     Paranoia (psychosis) (H)     MENTAL HEALTH     Carley (H)     Agitation     Aggression       Medications:    benztropine  1 mg Oral BID     fluticasone  1 puff Inhalation Daily     lamoTRIgine  75 mg Oral Daily     levonorgestrel-ethinyl estradiol  1 tablet Oral Daily     metFORMIN  500 mg Oral BID w/meals     OLANZapine zydis  15 mg Oral BID    Or     OLANZapine  10 mg Intramuscular BID     propranolol  20 mg Oral TID      Allergies:  Allergies   Allergen Reactions     Nkda [No Known Drug Allergies]       Labs:  No results found for this or any previous visit (from the past 24 hour(s)).    Psychiatric Examination:    /75   Pulse 80   Temp 98.2  F (36.8  C) (Oral)   Resp 16   Wt 65.8 kg (145 lb 1.6 oz)   SpO2 97%   BMI 22.73 kg/m    Weight is 145 lbs 1.6 oz  Body mass index is 22.73 kg/m .  Orthostatic Vitals       Most Recent      Sitting Orthostatic BP 99/52 03/01 0839    Sitting Orthostatic Pulse (bpm) 88 03/01 0839    Standing Orthostatic /60 03/01 0839    Standing Orthostatic Pulse (bpm) 90 03/01 0839            Appearance: dressed in scrubs  General behavior: Cooperative  Eye Contact: Fair   Gait and Motor Coordination: Normal gait and motor coordination  Speech: WNL  Language: Normal  Attention/Concentration: Fair  Orientation: oriented to time, place and person  Thought Process:  linear  Thought Content:  no evidence of suicidal ideation or homicidal ideation and no evidence of psychotic thought  Recent and Remote Memory:  impaired  Associations: no loose associations  Mood: irritable  Affect: restricted  Fund of knowledge: appropriate to education and experiences  Demonstration of insight/judgment: poor  Suicidal risk: Low      Clinical Global Impressions  First:  Considering your total clinical experience with this particular patient population, how severe are the patient's symptoms at this  time?: 6 (01/21/21 1437)  Compared to the patient's condition at the START of treatment, this patient's condition is: 3 (01/21/21 1437)  Most recent:  Considering your total clinical experience with this particular patient population, how severe are the patient's symptoms at this time?: 2 (02/16/21 1511)  Compared to the patient's condition at the START of treatment, this patient's condition is: 2 (02/16/21 1511)    Precautions:  Behavioral Orders   Procedures     Assault precautions     Code 1 - Restrict to Unit     Code 1 - Restrict to Unit     Elopement precautions     Fall precautions     Routine Programming     As clinically indicated     Status 15     Every 15 minutes.       Diagnoses:  Bipolar 1 Disorder, most recent episode manic  Amphetamine Use Disorder  Opioid Use Disorder    Assessment/Plan:    24 yo female with bipolar disorder and severe substance use problem.  Stabilized on Lamictal and Zyprexa.   Poor insight.     1. Continue hospitalization for safety and stabilization.  2. Continue Zyprexa and Lamictal  3. Awaiting placement to CARE  4. Expect 7 - 15 days    Reason for ongoing admission: is unable to care for self due to severe psychosis or jackie  Discharge location: [facility] dependency treatment facility  Discharge Medications: not ordered  Follow-up Appointments: not scheduled  Legal Status: full commitment and Carrillo (Zyprexa, Haldol, Risperidone, Abilify)    Entered by: Prabhjot Proctor MD on 03/03/2021 at 10:43 AM

## 2021-03-03 NOTE — PLAN OF CARE
Pt briefly attended the Therapeutic Recreation group this evening. Pt participated in the group discussion, adding her opinions about famous quotes. Pt remained an active part of the discussion for approximately x10 minutes before leaving for the remainder of the time.

## 2021-03-03 NOTE — PLAN OF CARE
Shift Summary  Chief Complaint: Psychotic, combative  Target Symptoms/Condition Status: Improving  Psych  No new concern today. Took morning medication with no difficulties. Denies all mental health symptoms. Participated in dance group therapy. Visible in milieu for few short periods. Isolative and withdrawn to self. No evidence of psychosis, paranoid, delusional thoughts or disorganized behavior.   Prn: nicotine gumes  Medical  Pt alert and oriented x to self and place. Denies pain. Vital sings WNL (see flow sheet for details). Slept 5.25 hours last night. Good medication compliance is noted. Seems tolerating medications well. No side effect reported by pt or noted by this writer. Appetite fair. No problem with bowel and bladder.   Prn: none  Continue to monitor pt's status Q 15 minutes and stabilize the patient's symptoms with the use of medications and therapeutic programming.

## 2021-03-03 NOTE — PLAN OF CARE
Psych:   -Anxiety: Pt reports that not knowing her next steps, in terms of discharge, causes her anxiety.   -Depression: Pt has a flat/blunted affect that brightens politely on approach. She denies depression.   -SI/SIB: Pt denies thoughts to hurt self, thoughts of death, and thoughts of suicide.   -HI: Pt denies.   -Hallucinations: Pt denies.   -Behaviors: Pt has been appropriately social with peers and staff this evening. She has been pacing the halls and reports she still experiences restless legs.    Medical/physical:   Sleep: Pt reports she is sleeping well.   Appetite: Pt has a big appetite and denies concerns r/t eating.   Toileting: Pt reports her last BM was earlier today. She denies constipation concerns at this time.   ADLs: Pt is independent with ADLs. She took a shower this evening and changed her linen independently.

## 2021-03-03 NOTE — PLAN OF CARE
Patient was recorded as asleep for 5.25hrs during 15min checks. She was awake during the night and requested some PRNs before heading back to her room.  She had no behavioral or medical concerns during the night.

## 2021-03-03 NOTE — PROGRESS NOTES
"Pt participated in dance/movement therapy (DMT) using movement with imagery and metaphor, exploring themes of \"metamorphosis\" transformation and change that is rooted in a solid past.  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  In this session, pt demonstrated improved self-regulation over the during of this hospitalization.         03/03/21 8428   Dance Movement Therapy   Type of Intervention structured groups   Response participates, initiates socially appropriate   Hours 1     "

## 2021-03-04 PROCEDURE — 250N000013 HC RX MED GY IP 250 OP 250 PS 637: Performed by: PSYCHIATRY & NEUROLOGY

## 2021-03-04 PROCEDURE — 99232 SBSQ HOSP IP/OBS MODERATE 35: CPT | Mod: 95 | Performed by: PSYCHIATRY & NEUROLOGY

## 2021-03-04 PROCEDURE — 250N000013 HC RX MED GY IP 250 OP 250 PS 637: Performed by: STUDENT IN AN ORGANIZED HEALTH CARE EDUCATION/TRAINING PROGRAM

## 2021-03-04 PROCEDURE — 124N000002 HC R&B MH UMMC

## 2021-03-04 RX ORDER — LAMOTRIGINE 100 MG/1
100 TABLET ORAL DAILY
Status: DISCONTINUED | OUTPATIENT
Start: 2021-03-05 | End: 2021-03-19

## 2021-03-04 RX ADMIN — PROPRANOLOL HYDROCHLORIDE 20 MG: 20 TABLET ORAL at 13:38

## 2021-03-04 RX ADMIN — PROPRANOLOL HYDROCHLORIDE 20 MG: 20 TABLET ORAL at 19:01

## 2021-03-04 RX ADMIN — NICOTINE POLACRILEX 4 MG: 2 GUM, CHEWING ORAL at 08:30

## 2021-03-04 RX ADMIN — BENZTROPINE MESYLATE 1 MG: 1 TABLET ORAL at 08:27

## 2021-03-04 RX ADMIN — NICOTINE POLACRILEX 4 MG: 2 GUM, CHEWING ORAL at 16:14

## 2021-03-04 RX ADMIN — FLUTICASONE FUROATE 1 PUFF: 100 POWDER RESPIRATORY (INHALATION) at 08:28

## 2021-03-04 RX ADMIN — LAMOTRIGINE 75 MG: 25 TABLET ORAL at 08:27

## 2021-03-04 RX ADMIN — NICOTINE POLACRILEX 4 MG: 2 GUM, CHEWING ORAL at 18:01

## 2021-03-04 RX ADMIN — NICOTINE POLACRILEX 4 MG: 2 GUM, CHEWING ORAL at 12:37

## 2021-03-04 RX ADMIN — OLANZAPINE 15 MG: 10 TABLET, ORALLY DISINTEGRATING ORAL at 08:27

## 2021-03-04 RX ADMIN — BENZTROPINE MESYLATE 1 MG: 1 TABLET ORAL at 19:01

## 2021-03-04 RX ADMIN — OLANZAPINE 15 MG: 10 TABLET, ORALLY DISINTEGRATING ORAL at 19:01

## 2021-03-04 RX ADMIN — METFORMIN HYDROCHLORIDE 500 MG: 500 TABLET, FILM COATED ORAL at 18:01

## 2021-03-04 RX ADMIN — NICOTINE POLACRILEX 4 MG: 2 GUM, CHEWING ORAL at 13:39

## 2021-03-04 RX ADMIN — NICOTINE POLACRILEX 4 MG: 2 GUM, CHEWING ORAL at 10:52

## 2021-03-04 RX ADMIN — METFORMIN HYDROCHLORIDE 500 MG: 500 TABLET, FILM COATED ORAL at 08:30

## 2021-03-04 RX ADMIN — LEVONORGESTREL AND ETHINYL ESTRADIOL 1 TABLET: KIT at 08:28

## 2021-03-04 RX ADMIN — PROPRANOLOL HYDROCHLORIDE 20 MG: 20 TABLET ORAL at 09:10

## 2021-03-04 ASSESSMENT — ACTIVITIES OF DAILY LIVING (ADL)
HYGIENE/GROOMING: INDEPENDENT
DRESS: INDEPENDENT
LAUNDRY: WITH SUPERVISION
ORAL_HYGIENE: INDEPENDENT

## 2021-03-04 NOTE — PROGRESS NOTES
[facility], [place]   Psychiatric Progress Note      Video-Visit Details    Type of service:  Video Visit    Video Start Time (time video started): 1119    Video End Time (time video stopped): 1120    Originating Location (pt. Location): psychiatric inpatient    Distant Location (provider location): Provider remote location    Mode of Communication:  Video Conference via [employer]    Physician has received verbal consent for a Video Visit from the patient? Yes    Interim History:  The patient's care was discussed with the treatment team during the daily team meeting and/or staff's chart notes were reviewed.  Overnight staff report Pt has been social in the milieu all evening. Affect is full range. She states she is feeling a bit anxious and restless, but overall doing well. She denies all other MH symptoms including SI/SIB/HI/AVH, depression. She paced the halls for awhile tonight. Writer offered for her to use the bike, but pt declined. Pt still clearly having difficulty with akathisia. Writer gave pt positive reinforcement for how patient she is being with the wait to go to [facility]. No PRNs given except nicotine gum. She ate dinner and showered this evening.    Has no complaints, going to groups.     Physical ROS:  The patient endorsed no side effects from medications except as above.. The remainder of 10-point review of systems was negative except as noted in Interim History.    Patient Active Problem List   Diagnosis     Polysubstance abuse (H)     Tobacco abuse     Generalized anxiety disorder     Bipolar depression (H)     Migraines     Dysmenorrhea     CARDIOVASCULAR SCREENING; LDL GOAL LESS THAN 160     Insomnia     Paranoia (psychosis) (H)     MENTAL HEALTH     Carley (H)     Agitation     Aggression       Medications:    benztropine  1 mg Oral BID     fluticasone  1 puff Inhalation Daily     lamoTRIgine  75 mg Oral Daily     levonorgestrel-ethinyl estradiol  1 tablet Oral Daily      metFORMIN  500 mg Oral BID w/meals     OLANZapine zydis  15 mg Oral BID    Or     OLANZapine  10 mg Intramuscular BID     propranolol  20 mg Oral TID      Allergies:  Allergies   Allergen Reactions     Nkda [No Known Drug Allergies]       Labs:  No results found for this or any previous visit (from the past 24 hour(s)).    Psychiatric Examination:    /75 (BP Location: Right arm)   Pulse 79   Temp 97.9  F (36.6  C) (Oral)   Resp 16   Wt 66.8 kg (147 lb 4.8 oz)   SpO2 99%   BMI 23.07 kg/m    Weight is 147 lbs 4.8 oz  Body mass index is 23.07 kg/m .  Orthostatic Vitals       Most Recent      Sitting Orthostatic BP 99/52 03/01 0839    Sitting Orthostatic Pulse (bpm) 88 03/01 0839    Standing Orthostatic /60 03/01 0839    Standing Orthostatic Pulse (bpm) 90 03/01 0839            Appearance: dressed in scrubs  General behavior: Cooperative  Eye Contact: Fair   Gait and Motor Coordination: Normal gait and motor coordination  Speech: WNL  Language: Normal  Attention/Concentration: Fair  Orientation: oriented to time, place and person  Thought Process:  linear  Thought Content:  no evidence of suicidal ideation or homicidal ideation and no evidence of psychotic thought  Recent and Remote Memory:  impaired  Associations: no loose associations  Mood: irritable  Affect: restricted  Fund of knowledge: appropriate to education and experiences  Demonstration of insight/judgment: poor  Suicidal risk: Low      Clinical Global Impressions  First:  Considering your total clinical experience with this particular patient population, how severe are the patient's symptoms at this time?: 6 (01/21/21 1437)  Compared to the patient's condition at the START of treatment, this patient's condition is: 3 (01/21/21 1437)  Most recent:  Considering your total clinical experience with this particular patient population, how severe are the patient's symptoms at this time?: 2 (02/16/21 1511)  Compared to the patient's condition at  the START of treatment, this patient's condition is: 2 (02/16/21 1511)    Precautions:  Behavioral Orders   Procedures     Assault precautions     Code 1 - Restrict to Unit     Code 1 - Restrict to Unit     Elopement precautions     Fall precautions     Routine Programming     As clinically indicated     Status 15     Every 15 minutes.       Diagnoses:  Bipolar 1 Disorder, most recent episode manic  Amphetamine Use Disorder  Opioid Use Disorder    Assessment/Plan:    26 yo female with bipolar disorder and severe substance use problem.  Stabilized on Lamictal and Zyprexa.   Poor insight.     1. Continue hospitalization for safety and stabilization.  2. Continue Zyprexa and Lamictal, increase the lamictal to 100 mg  3. Awaiting placement to [facility]  4. Expect 7 - 15 days    Reason for ongoing admission: is unable to care for self due to severe psychosis or jackie  Discharge location: [facility] dependency treatment facility  Discharge Medications: not ordered  Follow-up Appointments: not scheduled  Legal Status: full commitment and Carrillo (Zyprexa, Haldol, Risperidone, Abilify)    Entered by: Prabhjot Proctor MD on 03/04/2021 at 11:19 AM

## 2021-03-04 NOTE — PLAN OF CARE
"Pt has been active in the milieu. She did not attend group this afternoon. She has been somewhat restless, but can sit for at least 15 min. She states her anxiety is \"kind of high\" today. She was offered lavender which she declined. She still appears to have restless legs/akathisia symptoms. She has not requested any PRNs except nicotine gum. She states she is doing \"okay\" and is clearly feeling a bit frustrated with how long she is having to wait for placement. She denies SI/SIB/HI/AVH. She ate dinner and snack and went to bed early this evening. She took her medications without issue.     "

## 2021-03-04 NOTE — PLAN OF CARE
Work Completed:  The patient is on a very long wait-list for [facility] through [facility].      Discharge plan or goal:   Patient will eventually be transferred to a [facility] Program as she is under MICD Commitment (Maurice Gonzalez) with [facility]                Barriers to discharge:    Waiting for placement.

## 2021-03-04 NOTE — PLAN OF CARE
Shift Summary  Chief Complaint: Psychotic, combative  Target Symptoms/Condition Status: Improving   Psych  Pt was visible in milieu ,but isolative and withdrawn to self. Denies any suicidal ideation, homicidal ideation, Self injury behavior, hallucination, racing thought, depression and anxious. No evidence of psychosis, paranoid, delusional thoughts or disorganized behavior. Did participate in one activity group but left after 5 minutes.   Prn: none  Medical  Pt alert and oriented x 3. Denies pain. Vital sings WNL (see flow sheet for details). Slept 7.0 hours last night. Good medication compliance is noted. Seems tolerating medications well. No new side effect reported by pt or noted by this writer. Still restless and on cogentin. Appetite adequate. No problem with bowel and bladder.   Prn: none  Continue to monitor pt's status Q 15 minutes and stabilize the patient's symptoms with the use of medications and therapeutic programming.

## 2021-03-04 NOTE — PLAN OF CARE
Pt has been social in the milieu all evening. Affect is full range. She states she is feeling a bit anxious and restless, but overall doing well. She denies all other MH symptoms including SI/SIB/HI/AVH, depression. She paced the halls for awhile tonight. Writer offered for her to use the bike, but pt declined. Pt still clearly having difficulty with akathisia. Writer gave pt positive reinforcement for how patient she is being with the wait to go to [facility]. No PRNs given except nicotine gum. She ate dinner and showered this evening. VSS. Will continue to monitor.

## 2021-03-05 PROCEDURE — 99231 SBSQ HOSP IP/OBS SF/LOW 25: CPT | Performed by: PSYCHIATRY & NEUROLOGY

## 2021-03-05 PROCEDURE — 250N000013 HC RX MED GY IP 250 OP 250 PS 637: Performed by: PSYCHIATRY & NEUROLOGY

## 2021-03-05 PROCEDURE — 250N000013 HC RX MED GY IP 250 OP 250 PS 637: Performed by: STUDENT IN AN ORGANIZED HEALTH CARE EDUCATION/TRAINING PROGRAM

## 2021-03-05 PROCEDURE — 124N000002 HC R&B MH UMMC

## 2021-03-05 RX ADMIN — LEVONORGESTREL AND ETHINYL ESTRADIOL 1 TABLET: KIT at 09:33

## 2021-03-05 RX ADMIN — NICOTINE POLACRILEX 4 MG: 2 GUM, CHEWING ORAL at 10:48

## 2021-03-05 RX ADMIN — BENZTROPINE MESYLATE 1 MG: 1 TABLET ORAL at 09:34

## 2021-03-05 RX ADMIN — NICOTINE POLACRILEX 4 MG: 2 GUM, CHEWING ORAL at 12:42

## 2021-03-05 RX ADMIN — OLANZAPINE 15 MG: 10 TABLET, ORALLY DISINTEGRATING ORAL at 09:33

## 2021-03-05 RX ADMIN — METFORMIN HYDROCHLORIDE 500 MG: 500 TABLET, FILM COATED ORAL at 17:53

## 2021-03-05 RX ADMIN — NICOTINE POLACRILEX 4 MG: 2 GUM, CHEWING ORAL at 19:06

## 2021-03-05 RX ADMIN — PROPRANOLOL HYDROCHLORIDE 20 MG: 20 TABLET ORAL at 13:59

## 2021-03-05 RX ADMIN — NICOTINE POLACRILEX 4 MG: 2 GUM, CHEWING ORAL at 16:31

## 2021-03-05 RX ADMIN — PROPRANOLOL HYDROCHLORIDE 20 MG: 20 TABLET ORAL at 19:03

## 2021-03-05 RX ADMIN — METFORMIN HYDROCHLORIDE 500 MG: 500 TABLET, FILM COATED ORAL at 09:33

## 2021-03-05 RX ADMIN — LAMOTRIGINE 100 MG: 100 TABLET ORAL at 09:33

## 2021-03-05 RX ADMIN — NICOTINE POLACRILEX 4 MG: 2 GUM, CHEWING ORAL at 17:53

## 2021-03-05 RX ADMIN — NICOTINE POLACRILEX 4 MG: 2 GUM, CHEWING ORAL at 14:02

## 2021-03-05 RX ADMIN — FLUTICASONE FUROATE 1 PUFF: 100 POWDER RESPIRATORY (INHALATION) at 09:34

## 2021-03-05 RX ADMIN — OLANZAPINE 15 MG: 10 TABLET, ORALLY DISINTEGRATING ORAL at 19:03

## 2021-03-05 RX ADMIN — BENZTROPINE MESYLATE 1 MG: 1 TABLET ORAL at 19:03

## 2021-03-05 RX ADMIN — PROPRANOLOL HYDROCHLORIDE 20 MG: 20 TABLET ORAL at 09:33

## 2021-03-05 ASSESSMENT — ACTIVITIES OF DAILY LIVING (ADL)
LAUNDRY: WITH SUPERVISION
HYGIENE/GROOMING: HANDWASHING;SHOWER;INDEPENDENT
ORAL_HYGIENE: INDEPENDENT
ORAL_HYGIENE: INDEPENDENT
DRESS: INDEPENDENT
HYGIENE/GROOMING: INDEPENDENT
DRESS: SCRUBS (BEHAVIORAL HEALTH);INDEPENDENT
LAUNDRY: WITH SUPERVISION

## 2021-03-05 NOTE — PLAN OF CARE
Work Completed:  Patient is cooperative with care, directives from staff and taking all of her medications.  She is on a very long wait-list for the [facility] programs.  Patient has been encouraged to attend the therapeutic programming.  Patient's mother has been in touch with her and also this writer.  Mother and patient frustrated by the long-wait but understand that there is nothing the hospital can do t speed it up.    Discharge plan or goal:   Patient will enter a [facility]ARE program under MICD Ccommitment when bed opens up.                Barriers to discharge:    Waiting for placement at [facility]ARE.

## 2021-03-05 NOTE — PLAN OF CARE
"Pt has a bland affect; is resting in her rm. Her mood is \"neutral, bored.\" She denies MH sx. Pt said, \"It's taking too long\"- to wait for the \"care center\" she reports she is going to. She has been prompted to come for vitals/meds. She was up briefly for bkfst, and up later in the morning. See PCS for CP review.  "

## 2021-03-05 NOTE — PROGRESS NOTES
"  PROGRESS NOTE    The patient was seen for Dr. Proctor, her chart reviewed, her case discussed with staff.  She is committed and Jarvised by [facility] and arrangements are being made for her to go to one of the [facility] facility for long term MI/CD treatment.  She has been compliant with her scheduled Zyprexa and Lamictal although expressed some concerns about potential weight gain associated with Zyprexa.    This is a 25 year old single white female with a history of bipolar disorder, polysubstance dependency and medication noncompliance who was admitted here on 01/18/21 through ER where she was brought via ambulance because of agitation, paranoia and aggressive behavior. She was put on 72 hour hold  Initially she continued to be agitated and combative and required restraints. She received a couple of doses of Ketamine. She was diagnosed with amphetamine-induced psychotic disorder and bipolar disorder, manic and was started on Depakote ER and Zyprexa. Her Depakote was later replaced with Lamictal.  She continued to be disruptive and noncompliant and a petition for commitment with Carrillo hearing was initiated and supported.    Recently she has been doing better showing no grossly disruptive behavior although continued to be slightly hyperactive and pacing. She has been feeling \"much better\".    VS: Temp. - 97.9, Pulse - 86, BP - 108/72, Resp. - 16, SpO2 - 99%    Labs: No new results    Mental Status Examination  Reveals a normally built and normally developed, generally pleasant and cooperative with the interview 25 year old white female appearing her stated age. She is alert and oriented X 3. Her speech is coherent, logical, goal related, of normal tempo and tone. Her mood is neutral and she is approaching euthymia. She denies hallucinations and no prominent delusions can be noted or elicited. She has marginal insight into her problems and her judgement is questionable.    Diagnostic Impression    Bipolar " Disorder, last episode manic  S/P Amphetamine-induced psychotic disorder  Polysubstance Dependency    Plan: Continue Zyprexa in the same doses. Optimize the dose of Lamictal gradually reaching the goal dose of at least 100 mg BID. Transfer to [facility]ARE facility upon bed availability.    Thanks,    Houston Tristan MD
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
## 2021-03-06 PROCEDURE — 124N000002 HC R&B MH UMMC

## 2021-03-06 PROCEDURE — 250N000013 HC RX MED GY IP 250 OP 250 PS 637: Performed by: PSYCHIATRY & NEUROLOGY

## 2021-03-06 PROCEDURE — 250N000013 HC RX MED GY IP 250 OP 250 PS 637: Performed by: STUDENT IN AN ORGANIZED HEALTH CARE EDUCATION/TRAINING PROGRAM

## 2021-03-06 RX ADMIN — LAMOTRIGINE 100 MG: 100 TABLET ORAL at 08:58

## 2021-03-06 RX ADMIN — BENZTROPINE MESYLATE 1 MG: 1 TABLET ORAL at 19:05

## 2021-03-06 RX ADMIN — NICOTINE POLACRILEX 4 MG: 2 GUM, CHEWING ORAL at 12:30

## 2021-03-06 RX ADMIN — PROPRANOLOL HYDROCHLORIDE 20 MG: 20 TABLET ORAL at 08:58

## 2021-03-06 RX ADMIN — NICOTINE POLACRILEX 4 MG: 2 GUM, CHEWING ORAL at 11:17

## 2021-03-06 RX ADMIN — NICOTINE POLACRILEX 4 MG: 2 GUM, CHEWING ORAL at 15:32

## 2021-03-06 RX ADMIN — METFORMIN HYDROCHLORIDE 500 MG: 500 TABLET, FILM COATED ORAL at 08:58

## 2021-03-06 RX ADMIN — LEVONORGESTREL AND ETHINYL ESTRADIOL 1 TABLET: KIT at 09:00

## 2021-03-06 RX ADMIN — OLANZAPINE 15 MG: 10 TABLET, ORALLY DISINTEGRATING ORAL at 19:05

## 2021-03-06 RX ADMIN — PROPRANOLOL HYDROCHLORIDE 20 MG: 20 TABLET ORAL at 19:05

## 2021-03-06 RX ADMIN — PROPRANOLOL HYDROCHLORIDE 20 MG: 20 TABLET ORAL at 13:52

## 2021-03-06 RX ADMIN — NICOTINE POLACRILEX 4 MG: 2 GUM, CHEWING ORAL at 16:56

## 2021-03-06 RX ADMIN — BENZTROPINE MESYLATE 1 MG: 1 TABLET ORAL at 08:58

## 2021-03-06 RX ADMIN — NICOTINE POLACRILEX 4 MG: 2 GUM, CHEWING ORAL at 13:52

## 2021-03-06 RX ADMIN — OLANZAPINE 15 MG: 10 TABLET, ORALLY DISINTEGRATING ORAL at 08:58

## 2021-03-06 RX ADMIN — NICOTINE POLACRILEX 4 MG: 2 GUM, CHEWING ORAL at 18:05

## 2021-03-06 RX ADMIN — METFORMIN HYDROCHLORIDE 500 MG: 500 TABLET, FILM COATED ORAL at 17:56

## 2021-03-06 RX ADMIN — NICOTINE POLACRILEX 4 MG: 2 GUM, CHEWING ORAL at 19:07

## 2021-03-06 ASSESSMENT — ACTIVITIES OF DAILY LIVING (ADL)
LAUNDRY: WITH SUPERVISION
DRESS: INDEPENDENT
HYGIENE/GROOMING: INDEPENDENT
ORAL_HYGIENE: INDEPENDENT
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
## 2021-03-06 NOTE — PLAN OF CARE
"Pt came out of room and had her vitals checked but did not get medications.  Writer approached pt x 2 to come get her meds but she remained in bed.  Writer made 3rd attempt and pt apologized for not coming up and took her meds.  Pt denies any mental health concerns.  Pt denies any depression/anxiety or any other SI/SIB.  Pt informs writer of discharge plan to go to treatment and acknowledges this is important and doesn't want to use meth again. Pt states she slept ok last night she is just still tired.  Pt slept until around 11 and than paces the hallway with headphones on.  Pt is cooperative with staff and states \"I am just ready to get out of here and waiting for my bed to open.\"  Pt states she wish she had an idea of when she would be leaving.   "

## 2021-03-06 NOTE — PLAN OF CARE
Pt has been withdrawn to self this shift. Affect is more blunted today, though brightens upon approach. Pt is pleasant and respectful toward staff and peers. She states her anxiety is lower today. She seems to be feeling more down today about not knowing when she will be able to discharge and she states she is feeling bored. She took all of her medications without issue and continues to deny all other mental health symptoms including SI/SIB/HI/AVH. She ate dinner and snacks.

## 2021-03-07 PROCEDURE — 250N000013 HC RX MED GY IP 250 OP 250 PS 637: Performed by: PSYCHIATRY & NEUROLOGY

## 2021-03-07 PROCEDURE — 250N000013 HC RX MED GY IP 250 OP 250 PS 637: Performed by: STUDENT IN AN ORGANIZED HEALTH CARE EDUCATION/TRAINING PROGRAM

## 2021-03-07 PROCEDURE — 124N000002 HC R&B MH UMMC

## 2021-03-07 RX ADMIN — PROPRANOLOL HYDROCHLORIDE 20 MG: 20 TABLET ORAL at 19:07

## 2021-03-07 RX ADMIN — OLANZAPINE 15 MG: 10 TABLET, ORALLY DISINTEGRATING ORAL at 19:07

## 2021-03-07 RX ADMIN — METFORMIN HYDROCHLORIDE 500 MG: 500 TABLET, FILM COATED ORAL at 18:09

## 2021-03-07 RX ADMIN — LAMOTRIGINE 100 MG: 100 TABLET ORAL at 09:18

## 2021-03-07 RX ADMIN — NICOTINE POLACRILEX 4 MG: 2 GUM, CHEWING ORAL at 14:42

## 2021-03-07 RX ADMIN — NICOTINE POLACRILEX 4 MG: 2 GUM, CHEWING ORAL at 17:04

## 2021-03-07 RX ADMIN — NICOTINE POLACRILEX 4 MG: 2 GUM, CHEWING ORAL at 18:36

## 2021-03-07 RX ADMIN — PROPRANOLOL HYDROCHLORIDE 20 MG: 20 TABLET ORAL at 13:29

## 2021-03-07 RX ADMIN — NICOTINE POLACRILEX 2 MG: 2 GUM, CHEWING ORAL at 13:29

## 2021-03-07 RX ADMIN — FLUTICASONE FUROATE 1 PUFF: 100 POWDER RESPIRATORY (INHALATION) at 09:19

## 2021-03-07 RX ADMIN — OLANZAPINE 15 MG: 10 TABLET, ORALLY DISINTEGRATING ORAL at 09:17

## 2021-03-07 RX ADMIN — NICOTINE POLACRILEX 2 MG: 2 GUM, CHEWING ORAL at 10:57

## 2021-03-07 RX ADMIN — BENZTROPINE MESYLATE 1 MG: 1 TABLET ORAL at 19:07

## 2021-03-07 RX ADMIN — BENZTROPINE MESYLATE 1 MG: 1 TABLET ORAL at 09:18

## 2021-03-07 RX ADMIN — LEVONORGESTREL AND ETHINYL ESTRADIOL 1 TABLET: KIT at 09:19

## 2021-03-07 RX ADMIN — NICOTINE POLACRILEX 4 MG: 2 GUM, CHEWING ORAL at 09:37

## 2021-03-07 RX ADMIN — NICOTINE POLACRILEX 4 MG: 2 GUM, CHEWING ORAL at 15:55

## 2021-03-07 RX ADMIN — PROPRANOLOL HYDROCHLORIDE 20 MG: 20 TABLET ORAL at 09:17

## 2021-03-07 RX ADMIN — METFORMIN HYDROCHLORIDE 500 MG: 500 TABLET, FILM COATED ORAL at 09:17

## 2021-03-07 ASSESSMENT — ACTIVITIES OF DAILY LIVING (ADL)
DRESS: INDEPENDENT
LAUNDRY: UNABLE TO COMPLETE
HYGIENE/GROOMING: INDEPENDENT
ORAL_HYGIENE: INDEPENDENT

## 2021-03-07 NOTE — PLAN OF CARE
Pt isolative and withdrawn the entire shift and only comes out for meals only.  Pt appropriate and social with staff and peers when out in the milieu.  Pt denies SI/Self harm thoughts, urges, plan, and intent.  Pt denies AVH/HI, anxiety and depression. Pt medication compliant and denies any side effects this shift. Pt reports adequate sleep and appetite. Staff will continue to monitor and support.            Problem: Behavioral Health Plan of Care  Goal: Plan of Care Review  Outcome: No Change     Problem: Sleep Disturbance (Disruptive Behavior)  Goal: Improved Sleep (Disruptive Behavior)  Outcome: No Change

## 2021-03-07 NOTE — PLAN OF CARE
"Nursing Assessment    Mental Status Exam:  Appearance: Attire appropriate to situation  Behavior/relationship to examiner/demeanor: Cooperative, polite  Affect: Blunted/flat, brightens upon approach  Mood: Reports mood is \"good\"  Gait: Steady, balanced, some pacing noted.   Speech rate, volume, coherence: Clear, coherent, normal rate and volume  Attention/Concentration: Easily distracted, able to follow movie plot  Insight: Improving  Judgment: Impaired  Suicide Assessment:   Recent suicidal thoughts: No   Any attempts in the last 24 hours: No   Plan or considering various methods: No   Access to means: No   Verbal or Written contract for safety: yes   Self Injurious Behavior: No, none observed  Goal No stated goal this shift  General Shift Summary  Visible in milieu for half the shift. Observed watching movie with peers and pacing some in the mitchell. She was withdrawn from peers but would socialize when approached.  Depression a level 0/10, anxiety 0/10,   HI, aggressive behaviors not present.  Auditory/visual hallucinations: Denies, does not appear responding  Patient is medication compliant and had no reported side effects. PRN medications this shift included nicotine gum.  Hygiene: Adequate  Eating: Ate all meals  Plan is to continue to monitor patient status q 15 mins, assess response to medications, and maintain the patients safety.    "

## 2021-03-08 PROCEDURE — 250N000013 HC RX MED GY IP 250 OP 250 PS 637: Performed by: PSYCHIATRY & NEUROLOGY

## 2021-03-08 PROCEDURE — 99231 SBSQ HOSP IP/OBS SF/LOW 25: CPT | Performed by: PSYCHIATRY & NEUROLOGY

## 2021-03-08 PROCEDURE — U0005 INFEC AGEN DETEC AMPLI PROBE: HCPCS | Performed by: PSYCHIATRY & NEUROLOGY

## 2021-03-08 PROCEDURE — 250N000013 HC RX MED GY IP 250 OP 250 PS 637: Performed by: STUDENT IN AN ORGANIZED HEALTH CARE EDUCATION/TRAINING PROGRAM

## 2021-03-08 PROCEDURE — 124N000002 HC R&B MH UMMC

## 2021-03-08 PROCEDURE — U0003 INFECTIOUS AGENT DETECTION BY NUCLEIC ACID (DNA OR RNA); SEVERE ACUTE RESPIRATORY SYNDROME CORONAVIRUS 2 (SARS-COV-2) (CORONAVIRUS DISEASE [COVID-19]), AMPLIFIED PROBE TECHNIQUE, MAKING USE OF HIGH THROUGHPUT TECHNOLOGIES AS DESCRIBED BY CMS-2020-01-R: HCPCS | Performed by: PSYCHIATRY & NEUROLOGY

## 2021-03-08 RX ADMIN — LAMOTRIGINE 100 MG: 100 TABLET ORAL at 08:48

## 2021-03-08 RX ADMIN — PROPRANOLOL HYDROCHLORIDE 20 MG: 20 TABLET ORAL at 08:48

## 2021-03-08 RX ADMIN — NICOTINE POLACRILEX 4 MG: 2 GUM, CHEWING ORAL at 11:19

## 2021-03-08 RX ADMIN — PROPRANOLOL HYDROCHLORIDE 20 MG: 20 TABLET ORAL at 19:02

## 2021-03-08 RX ADMIN — OLANZAPINE 15 MG: 10 TABLET, ORALLY DISINTEGRATING ORAL at 19:02

## 2021-03-08 RX ADMIN — NICOTINE POLACRILEX 4 MG: 2 GUM, CHEWING ORAL at 16:35

## 2021-03-08 RX ADMIN — NICOTINE POLACRILEX 4 MG: 2 GUM, CHEWING ORAL at 12:35

## 2021-03-08 RX ADMIN — FLUTICASONE FUROATE 1 PUFF: 100 POWDER RESPIRATORY (INHALATION) at 08:48

## 2021-03-08 RX ADMIN — BENZTROPINE MESYLATE 1 MG: 1 TABLET ORAL at 08:48

## 2021-03-08 RX ADMIN — OLANZAPINE 15 MG: 10 TABLET, ORALLY DISINTEGRATING ORAL at 08:48

## 2021-03-08 RX ADMIN — NICOTINE POLACRILEX 4 MG: 2 GUM, CHEWING ORAL at 14:00

## 2021-03-08 RX ADMIN — NICOTINE POLACRILEX 4 MG: 2 GUM, CHEWING ORAL at 15:14

## 2021-03-08 RX ADMIN — METFORMIN HYDROCHLORIDE 500 MG: 500 TABLET, FILM COATED ORAL at 08:48

## 2021-03-08 RX ADMIN — BENZTROPINE MESYLATE 1 MG: 1 TABLET ORAL at 19:02

## 2021-03-08 RX ADMIN — NICOTINE POLACRILEX 4 MG: 2 GUM, CHEWING ORAL at 19:04

## 2021-03-08 RX ADMIN — LEVONORGESTREL AND ETHINYL ESTRADIOL 1 TABLET: KIT at 08:49

## 2021-03-08 RX ADMIN — PROPRANOLOL HYDROCHLORIDE 20 MG: 20 TABLET ORAL at 13:44

## 2021-03-08 RX ADMIN — NICOTINE POLACRILEX 4 MG: 2 GUM, CHEWING ORAL at 17:31

## 2021-03-08 RX ADMIN — METFORMIN HYDROCHLORIDE 500 MG: 500 TABLET, FILM COATED ORAL at 17:31

## 2021-03-08 ASSESSMENT — ACTIVITIES OF DAILY LIVING (ADL)
DRESS: SCRUBS (BEHAVIORAL HEALTH);INDEPENDENT
LAUNDRY: WITH SUPERVISION
ORAL_HYGIENE: INDEPENDENT
HYGIENE/GROOMING: INDEPENDENT

## 2021-03-08 NOTE — PLAN OF CARE
Shift Summary  Chief Complaint: Psychotic, combative  Target Symptoms/Condition Status: Improving  Psych  No new concern. Calm and cooperative. No evidence of psychosis, paranoid, delusional thoughts or disorganized behavior. Denies any suicidal ideation, homicidal ideation, Self injury behavior, hallucination, racing thought, depression or anxiety. Visible in milieu ,but isolative and withdrawn to self.   Prn: none  Medical  Pt alert and oriented x 3. Denies pain. Vital sings WNL (see flow sheet for details). Slept 5.75 hours last night. Good medication compliance is noted. Seems tolerating medications well. No side effect reported by pt or noted by this writer. Appetite adequate. No problem with bowel and bladder.   Prn: none  Continue to monitor pt's status Q 15 minutes and stabilize the patient's symptoms with the use of medications and therapeutic programming.

## 2021-03-08 NOTE — PLAN OF CARE
"Pt was visible in the milieu for the first half of the shift. She was observed sitting in the lounge watching TV with peers. Otherwise she spent time in her room sitting on her bed or sleeping. Pt's affect was blunted/flat but she brightened upon approach. Pt's mood was clam. Pt was polite and cooperative with staff, she interacted appropriate with peers and staff and engaged in positive social interaction with staff. Pt denies all mental health concerns including SI, SIB, anxiety, depression, and hallucinations. Pt was medication compliant and had no reported side effects. Pt did receive nicotine gum as a PRN this shift. Pt ate all meals and reports sleep is \"fine\". Overall pt had a good shift, there were no major concerns.   "

## 2021-03-08 NOTE — PROGRESS NOTES
"[facility], [place]   Psychiatric Progress Note    Interim History:  The patient's care was discussed with the treatment team during the daily team meeting and/or staff's chart notes were reviewed.  Overnight staff report Pt has been social in the milieu all evening. Affect is full range. She denied all MH symptoms including SI/SIB/HI/AVH, depression. Had no complaints, going to groups.     In interview was seen in her room/bed soundly asleep. She woke up and talked to me. Recognized myself from being under my care in recent past. Said that she was sleeping: \"to pass time\" and hoping that bed at [facility] unit would become available. She had no questions or concerns.   Patient Active Problem List   Diagnosis     Polysubstance abuse (H)     Tobacco abuse     Generalized anxiety disorder     Bipolar depression (H)     Migraines     Dysmenorrhea     CARDIOVASCULAR SCREENING; LDL GOAL LESS THAN 160     Insomnia     Paranoia (psychosis) (H)     MENTAL HEALTH     Carley (H)     Agitation     Aggression       Medications:    benztropine  1 mg Oral BID     fluticasone  1 puff Inhalation Daily     lamoTRIgine  100 mg Oral Daily     levonorgestrel-ethinyl estradiol  1 tablet Oral Daily     metFORMIN  500 mg Oral BID w/meals     OLANZapine zydis  15 mg Oral BID    Or     OLANZapine  10 mg Intramuscular BID     propranolol  20 mg Oral TID      Allergies:  Allergies   Allergen Reactions     Nkda [No Known Drug Allergies]       Labs:  No results found for this or any previous visit (from the past 24 hour(s)).    Psychiatric Examination:    /77 (BP Location: Left arm)   Pulse 91   Temp 98.1  F (36.7  C) (Oral)   Resp 16   Wt 68.7 kg (151 lb 8 oz)   SpO2 98%   BMI 23.73 kg/m    Weight is 151 lbs 8 oz  Body mass index is 23.73 kg/m .     Orthostatic Vitals       Most Recent      Sitting Orthostatic /70 03/08 0700    Sitting Orthostatic Pulse (bpm) 92 03/08 0700    Standing Orthostatic BP " "100/69 03/08 0700    Standing Orthostatic Pulse (bpm) 99 03/08 0700         Appearance: dressed in scrubs  General behavior: Cooperative  Eye Contact: Fair   Gait and Motor Coordination: Normal gait and motor coordination  Speech: WNL  Language: Normal  Attention/Concentration: Fair  Orientation: oriented to time, place and person  Thought Process:  linear  Thought Content:  no evidence of suicidal ideation or homicidal ideation and no evidence of psychotic thought  Recent and Remote Memory:  impaired  Associations: no loose associations  Mood: \"I am OK\"  Affect: restricted  Fund of knowledge: appropriate to education and experiences  Demonstration of insight/judgment: poor  Suicidal risk: Low      Clinical Global Impressions  First:  Considering your total clinical experience with this particular patient population, how severe are the patient's symptoms at this time?: 6 (01/21/21 1437)  Compared to the patient's condition at the START of treatment, this patient's condition is: 3 (01/21/21 1437)  Most recent:  Considering your total clinical experience with this particular patient population, how severe are the patient's symptoms at this time?: 3 (3/8/2021)  Compared to the patient's condition at the START of treatment, this patient's condition is: 2 (3/8/2021)    Precautions:  Behavioral Orders   Procedures     Assault precautions     Code 1 - Restrict to Unit     Code 1 - Restrict to Unit     Elopement precautions     Fall precautions     Routine Programming     As clinically indicated     Status 15     Every 15 minutes.       Diagnoses:  Bipolar 1 Disorder, most recent episode manic, currently recovered  Amphetamine Use Disorder  Opioid Use Disorder    Assessment/Plan:    26 yo female with bipolar disorder and severe substance use problem.  Stabilized on Lamictal and Zyprexa.   Poor insight.     1. Continue hospitalization for safety and stabilization.  2. Continue Zyprexa and Lamictal, increase the lamictal to " 100 mg  3. Awaiting placement to [facility]  4. Expect 7 - 15 days    Reason for ongoing admission: is unable to care for self due to high risk of relapse, committed and waiting for a bed at [facility]ARE unit.   Discharge location: [facility]  Discharge Medications: not ordered  Follow-up Appointments: not scheduled  Legal Status: full commitment and Carrillo (Zyprexa, Haldol, Risperidone, Abilify)    Willy Yi MD  [facility]

## 2021-03-08 NOTE — PLAN OF CARE
"            Work Completed:  Updated Dr. FARRELL today on the issue with the wait-list at [facility].  Met with the patient who is now attending more programming.  She is aware that this writer cannot give her a time line as to \"how long\" she will have to wait in the hospital for a bed at the CARE program or which one she will go to.  Patient is pleasant and cooperative with all care and taking all medicatons.  She is getting support from her mother.  Called [facility] and sent updated records.  They cannot tell us the length of the wait-list as other patients (IV drug use females, etc.) Slide in ahead of the main list. Called patient's [facility]  Leatha Zamudio but she is out of the office until tomorrow.  (Leatha Kong [phone]).     Discharge plan or goal:   The patient is MICD committed in [facility] and on the list for a CARE Program.                Barriers to discharge:    Waiting for CARE placement.    "

## 2021-03-08 NOTE — PLAN OF CARE
BEHAVIORAL TEAM DISCUSSION    Participants: Dr. Willy Yi; Olive Ugarte [facility]; Savana Regalado RN  Progress: Patient is attending programming, taking all medications and compliant with all care.  Anticipated length of stay: Unknown  Continued Stay Criteria/Rationale: Committed as MICD with Carrillo to enter CARE program  Medical/Physical: Nothing provided  Precautions:   Behavioral Orders   Procedures    Assault precautions    Code 1 - Restrict to Unit    Code 1 - Restrict to Unit    Elopement precautions    Fall precautions    Routine Programming     As clinically indicated    Status 15     Every 15 minutes.     Plan: The patient can contract for safety.  She is compliant with all care and waiting to ener a CARE program.  This writer was told by CPA that they were told to no longer try to ascertain length of the wait for patients on their list.  Patient aware that we will be unable to giver her a timel line.  Rationale for change in precautions or plan: No changes
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
## 2021-03-09 PROCEDURE — 250N000013 HC RX MED GY IP 250 OP 250 PS 637: Performed by: PSYCHIATRY & NEUROLOGY

## 2021-03-09 PROCEDURE — 250N000013 HC RX MED GY IP 250 OP 250 PS 637: Performed by: STUDENT IN AN ORGANIZED HEALTH CARE EDUCATION/TRAINING PROGRAM

## 2021-03-09 PROCEDURE — 99231 SBSQ HOSP IP/OBS SF/LOW 25: CPT | Performed by: PSYCHIATRY & NEUROLOGY

## 2021-03-09 PROCEDURE — 124N000002 HC R&B MH UMMC

## 2021-03-09 PROCEDURE — G0177 OPPS/PHP; TRAIN & EDUC SERV: HCPCS

## 2021-03-09 RX ORDER — TOPIRAMATE 25 MG/1
25 TABLET, FILM COATED ORAL EVERY 12 HOURS SCHEDULED
Status: DISCONTINUED | OUTPATIENT
Start: 2021-03-09 | End: 2021-03-12

## 2021-03-09 RX ADMIN — NICOTINE POLACRILEX 4 MG: 2 GUM, CHEWING ORAL at 17:22

## 2021-03-09 RX ADMIN — LAMOTRIGINE 100 MG: 100 TABLET ORAL at 09:35

## 2021-03-09 RX ADMIN — BENZTROPINE MESYLATE 1 MG: 1 TABLET ORAL at 19:49

## 2021-03-09 RX ADMIN — METFORMIN HYDROCHLORIDE 500 MG: 500 TABLET, FILM COATED ORAL at 17:50

## 2021-03-09 RX ADMIN — PROPRANOLOL HYDROCHLORIDE 20 MG: 20 TABLET ORAL at 09:35

## 2021-03-09 RX ADMIN — NICOTINE POLACRILEX 4 MG: 2 GUM, CHEWING ORAL at 16:16

## 2021-03-09 RX ADMIN — TOPIRAMATE 25 MG: 25 TABLET, FILM COATED ORAL at 19:49

## 2021-03-09 RX ADMIN — OLANZAPINE 15 MG: 10 TABLET, ORALLY DISINTEGRATING ORAL at 09:34

## 2021-03-09 RX ADMIN — LEVONORGESTREL AND ETHINYL ESTRADIOL 1 TABLET: KIT at 09:36

## 2021-03-09 RX ADMIN — NICOTINE POLACRILEX 4 MG: 2 GUM, CHEWING ORAL at 13:20

## 2021-03-09 RX ADMIN — METFORMIN HYDROCHLORIDE 500 MG: 500 TABLET, FILM COATED ORAL at 09:34

## 2021-03-09 RX ADMIN — BENZTROPINE MESYLATE 1 MG: 1 TABLET ORAL at 09:35

## 2021-03-09 RX ADMIN — PROPRANOLOL HYDROCHLORIDE 20 MG: 20 TABLET ORAL at 13:20

## 2021-03-09 RX ADMIN — NICOTINE POLACRILEX 4 MG: 2 GUM, CHEWING ORAL at 11:52

## 2021-03-09 RX ADMIN — OLANZAPINE 15 MG: 10 TABLET, ORALLY DISINTEGRATING ORAL at 19:49

## 2021-03-09 RX ADMIN — NICOTINE POLACRILEX 4 MG: 2 GUM, CHEWING ORAL at 09:35

## 2021-03-09 RX ADMIN — FLUTICASONE FUROATE 1 PUFF: 100 POWDER RESPIRATORY (INHALATION) at 09:36

## 2021-03-09 RX ADMIN — NICOTINE POLACRILEX 4 MG: 2 GUM, CHEWING ORAL at 10:30

## 2021-03-09 ASSESSMENT — ACTIVITIES OF DAILY LIVING (ADL)
DRESS: INDEPENDENT
LAUNDRY: WITH SUPERVISION
ORAL_HYGIENE: INDEPENDENT
HYGIENE/GROOMING: INDEPENDENT

## 2021-03-09 NOTE — PLAN OF CARE
Problem: Behavioral Health Plan of Care  Goal: Plan of Care Review  Outcome: No Change  Flowsheets (Taken 3/8/2021 2112)  Plan of Care Reviewed With: patient  Patient Agreement with Plan of Care: agrees       Patient had been visible in the milieu. She was pacing around the unit at early part of shift. Requested for PRN nicotine gums per usual. Watched TV, ate snacks and supper meal. She stated that her goal is to just get through the day. Rated depression 4/10 and anxiety 7/10. Denied SI, SIB and hallucinations. Somewhat hopeful for her future. Denied having pain and side effects of medications is akathisia. No behavioral concerns so far.
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
## 2021-03-09 NOTE — PLAN OF CARE
Shift Summary  Psych  No new concern. Still waiting for placement and on Care Program waiting list. No evidence of psychosis, paranoid, delusional thoughts or disorganized behavior. Denies any suicidal ideation, homicidal ideation, Self injury behavior, hallucination, racing thought, depression and anxiety. Participated in OT group therapy.   Prn: Nicotine gums  Medical  Pt alert and oriented x 3. Denies pain. Vital sings WNL (see flow sheet for details). Slept 7.0 hours last night. Good medication compliance is noted. No new side effect reported by pt or noted by this writer. Appetite adequate. No problem with bowel and bladder.   Prn: none  Continue to monitor pt's status Q 15 minutes and stabilize the patient's symptoms with the use of medications and therapeutic programming.

## 2021-03-09 NOTE — PROVIDER NOTIFICATION
03/09/21 0000 03/09/21 0614   Sleep/Rest/Relaxation   Sleep/Rest/Relaxation (WDL) WDL  --    Sleep/Rest/Relaxation appears asleep difficulty falling asleep   Night Time # Hours  --  5.75 hours   Pt stayed in the room all shift. Pt had difficulties falling asleep but managed to get 5.75 hrs of sleep. No concerns this shift.

## 2021-03-09 NOTE — PLAN OF CARE
Work Completed:  Updated the Team and Outlier Mtg that patient remains on long list for CARE programs and that CPA staff is no longer able to give us an estimate of wait-time.  Left message for patient's [place]  Leatha Kong  to please call me and discuss the patient's commitment to locked CARE units.  Would like to see if their are any alternatives  knowing that the  and Court wanted a locked program and the only one available is through CARE. Patient is completely compliant with all unit rules.  Attends some programming, social and interactive with other patients and staff, taking all medications prescribed. Asks daily if this writer has heard anything from CARE?     Discharge plan or goal:   Patient will enter a CARE program.                 Barriers to discharge:     Committed MICD with a Carrillo Order in [place]. Waiting for placement.

## 2021-03-09 NOTE — PLAN OF CARE
"Problem: OT General Care Plan  Goal: OT Goal 1  Description: Pt will demonstrate consistent engagement in OT group activities that support recovery as evidenced by participating in >1 scheduled OT group/day for 5 days.     Pt attended 1 out of 3 OT groups offered. Pt actively participated in occupational therapy clinic with 4 patients total x45 minutes. Pt was able to ask for assistance as needed, and independently initiated a structured creative expression task. Attentive to task for about x5 minute intervals, though spent a majority of group appropriately socializing with peers and looking out the window. She reported frustration regarding not having a timeline for discharge, though stated that she's \"trying to stay positive.\" Discussed getting involved with people who were negative influences on her prior to this hospitalization, and verbalized her goal to stay sober after discharge. Appeared restless, as she was frequently in and out of group. Calm, pleasant, and cooperative throughout this group with a congruent affect.    "

## 2021-03-10 PROCEDURE — 250N000013 HC RX MED GY IP 250 OP 250 PS 637: Performed by: PSYCHIATRY & NEUROLOGY

## 2021-03-10 PROCEDURE — 124N000002 HC R&B MH UMMC

## 2021-03-10 PROCEDURE — 99231 SBSQ HOSP IP/OBS SF/LOW 25: CPT | Performed by: PSYCHIATRY & NEUROLOGY

## 2021-03-10 PROCEDURE — 250N000013 HC RX MED GY IP 250 OP 250 PS 637: Performed by: STUDENT IN AN ORGANIZED HEALTH CARE EDUCATION/TRAINING PROGRAM

## 2021-03-10 RX ADMIN — TOPIRAMATE 25 MG: 25 TABLET, FILM COATED ORAL at 20:36

## 2021-03-10 RX ADMIN — NICOTINE POLACRILEX 4 MG: 2 GUM, CHEWING ORAL at 10:19

## 2021-03-10 RX ADMIN — NICOTINE POLACRILEX 4 MG: 2 GUM, CHEWING ORAL at 16:12

## 2021-03-10 RX ADMIN — PROPRANOLOL HYDROCHLORIDE 20 MG: 20 TABLET ORAL at 20:36

## 2021-03-10 RX ADMIN — BENZTROPINE MESYLATE 1 MG: 1 TABLET ORAL at 20:36

## 2021-03-10 RX ADMIN — BENZTROPINE MESYLATE 1 MG: 1 TABLET ORAL at 08:39

## 2021-03-10 RX ADMIN — LAMOTRIGINE 100 MG: 100 TABLET ORAL at 08:38

## 2021-03-10 RX ADMIN — FLUTICASONE FUROATE 1 PUFF: 100 POWDER RESPIRATORY (INHALATION) at 08:39

## 2021-03-10 RX ADMIN — NICOTINE POLACRILEX 4 MG: 2 GUM, CHEWING ORAL at 14:19

## 2021-03-10 RX ADMIN — OLANZAPINE 15 MG: 10 TABLET, ORALLY DISINTEGRATING ORAL at 08:38

## 2021-03-10 RX ADMIN — NICOTINE POLACRILEX 4 MG: 2 GUM, CHEWING ORAL at 13:07

## 2021-03-10 RX ADMIN — NICOTINE POLACRILEX 4 MG: 2 GUM, CHEWING ORAL at 17:34

## 2021-03-10 RX ADMIN — OLANZAPINE 15 MG: 10 TABLET, ORALLY DISINTEGRATING ORAL at 20:36

## 2021-03-10 RX ADMIN — PROPRANOLOL HYDROCHLORIDE 20 MG: 20 TABLET ORAL at 13:07

## 2021-03-10 RX ADMIN — METFORMIN HYDROCHLORIDE 500 MG: 500 TABLET, FILM COATED ORAL at 18:21

## 2021-03-10 RX ADMIN — NICOTINE POLACRILEX 4 MG: 2 GUM, CHEWING ORAL at 20:42

## 2021-03-10 RX ADMIN — NICOTINE POLACRILEX 4 MG: 2 GUM, CHEWING ORAL at 11:57

## 2021-03-10 RX ADMIN — PROPRANOLOL HYDROCHLORIDE 20 MG: 20 TABLET ORAL at 08:38

## 2021-03-10 RX ADMIN — NICOTINE POLACRILEX 4 MG: 2 GUM, CHEWING ORAL at 08:41

## 2021-03-10 RX ADMIN — METFORMIN HYDROCHLORIDE 500 MG: 500 TABLET, FILM COATED ORAL at 08:39

## 2021-03-10 RX ADMIN — LEVONORGESTREL AND ETHINYL ESTRADIOL 1 TABLET: KIT at 08:40

## 2021-03-10 RX ADMIN — TOPIRAMATE 25 MG: 25 TABLET, FILM COATED ORAL at 08:38

## 2021-03-10 ASSESSMENT — ACTIVITIES OF DAILY LIVING (ADL)
ORAL_HYGIENE: INDEPENDENT
HYGIENE/GROOMING: INDEPENDENT
LAUNDRY: WITH SUPERVISION
DRESS: SCRUBS (BEHAVIORAL HEALTH)

## 2021-03-10 NOTE — PLAN OF CARE
Pt has been pacing the halls this shift. She expressed feeling upset about the weight gain side effects involved with her zyprexa. She states she had a good experience in the past with Abilify and did not have any weight gain. She states she would prefer to take abilify and has told the provider this multiple times but she feels like she is not being heard. Writer encouraged pt to ask provider about this tomorrow to hear what the reason is for not switching. Pt expressed frustration about this and also about not knowing a timeline for when she will go to [facility]. She is also frustrated that she heard others can bump her down the list for cares. Other than frustration, pt remains calm and cooperative.      Pt started topamax tonight to help with weight gain from zyprexa. No side effects noted. Pt not given scheduled propranolol tonight due to low BP.

## 2021-03-10 NOTE — PROGRESS NOTES
[facility], [place]   Psychiatric Progress Note    Interim History:  The patient's care was discussed with the treatment team during the daily team meeting and/or staff's chart notes were reviewed.  Overnight staff report Pt has been social in the milieu. Affect is full range. She denied all MH symptoms including SI/SIB/HI/AVH, depression. Had no complaints, going to groups. Said that she was bored with being at this hospital and talked to her  about being at this hospital for too long time. She also met with [facility] and discussed possible changes in conditions of her commitment to options other than CARE, see [facility]'s note below:    Met with the patient who spoke to her  Warner Stallworth who was surprised she was still in the hospital and had not gone to CARE by now.  Alicia explained to him that she is on a very long wait. He planned to discuss the situation with the other Co Attys in [place] to see if we could come up with a different plan.  I left another message with Jessica Kong requesting a call back to discuss the CARE wait-list situation since patient could be here for months and  Central Preadmission [facility]PA will no longer even give the hospital a ballpark timeline on when she will get a bed.       Maurice Kong called back and plans to discuss this case with her Supervisor because the patient's current Order commits [facility].  She also now aware that Alicia has spoken to her  about being on a list with no means of knowing when she can transfer out of this hospital.  Bossman VINES will call me back.  I will discuss with Dr. FARRELL whether he would be comfortable changing the commitment so patient could possibly go to an unlocked MICD program rather than just CARE.     In interview was seen in the patients' lounge. Denied having any concerns other than weight gain. Asked to be switched to  Abilify from Zyprexa. I explained again that she is stable on Zyprexa and her weight gain would be hopefully, upset with adding Topamax and watching what she eats. She was at least partially receptive to my words and had no further questions or concerns.   Patient Active Problem List   Diagnosis     Polysubstance abuse (H)     Tobacco abuse     Generalized anxiety disorder     Bipolar depression (H)     Migraines     Dysmenorrhea     CARDIOVASCULAR SCREENING; LDL GOAL LESS THAN 160     Insomnia     Paranoia (psychosis) (H)     MENTAL HEALTH     Carley (H)     Agitation     Aggression       Medications:    benztropine  1 mg Oral BID     fluticasone  1 puff Inhalation Daily     lamoTRIgine  100 mg Oral Daily     levonorgestrel-ethinyl estradiol  1 tablet Oral Daily     metFORMIN  500 mg Oral BID w/meals     OLANZapine zydis  15 mg Oral BID    Or     OLANZapine  10 mg Intramuscular BID     propranolol  20 mg Oral TID     topiramate  25 mg Oral Q12H YELENA      Allergies:  Allergies   Allergen Reactions     Nkda [No Known Drug Allergies]       Labs:  No results found for this or any previous visit (from the past 24 hour(s)).    Psychiatric Examination:    /56 (BP Location: Left arm)   Pulse 89   Temp 97.8  F (36.6  C) (Oral)   Resp 16   Wt 69.9 kg (154 lb)   SpO2 97%   BMI 24.12 kg/m    Weight is 154 lbs 0 oz  Body mass index is 24.12 kg/m .     Orthostatic Vitals       Most Recent      Sitting Orthostatic /79 03/10 0836    Sitting Orthostatic Pulse (bpm) 91 03/10 0836    Standing Orthostatic /79 03/10 0836    Standing Orthostatic Pulse (bpm) 108 03/10 0836         Appearance: dressed in scrubs  General behavior: Cooperative  Eye Contact: Fair   Gait and Motor Coordination: Normal gait and motor coordination  Speech: WNL  Language: Normal  Attention/Concentration: Fair  Orientation: oriented to time, place and person  Thought Process:  linear  Thought Content:  no evidence of suicidal ideation or  "homicidal ideation and no evidence of psychotic thought  Recent and Remote Memory: improved  Associations: no loose associations  Mood: \"I am OK\"  Affect: full range  Fund of knowledge: appropriate to education and experiences  Demonstration of insight/judgment: poor, but improving   Suicidal risk: Low      Clinical Global Impressions  First:  Considering your total clinical experience with this particular patient population, how severe are the patient's symptoms at this time?: 6 (01/21/21 1437)  Compared to the patient's condition at the START of treatment, this patient's condition is: 3 (01/21/21 1437)  Most recent:  Considering your total clinical experience with this particular patient population, how severe are the patient's symptoms at this time?: 3 (3/8/2021)  Compared to the patient's condition at the START of treatment, this patient's condition is: 2 (3/8/2021)    Precautions:  Behavioral Orders   Procedures     Assault precautions     Code 1 - Restrict to Unit     Code 1 - Restrict to Unit     Elopement precautions     Fall precautions     Routine Programming     As clinically indicated     Status 15     Every 15 minutes.       Diagnoses:  Bipolar 1 Disorder, most recent episode manic, currently recovered  Amphetamine Use Disorder  Opioid Use Disorder    Assessment/Plan:    24 yo female with bipolar disorder and severe substance use problem.  Stabilized on Lamictal and Zyprexa.   Poor insight.     1. Continue hospitalization for safety and stabilization.  2. Continue Zyprexa and Lamictal, the lamictal to 100 mg. Started on Topamax yesterday to offset weight gain.  3. Awaiting placement to CARE  4. Expect 7 - 15 days    Reason for ongoing admission: is unable to care for self due to high risk of relapse, committed and waiting for a bed at CARE unit.   Discharge location: [facility] dependency treatment facility  Discharge Medications: not ordered  Follow-up Appointments: not scheduled  Legal Status: full " commitment and Carrillo (Zyprexa, Haldol, Risperidone, Abilify)    Willy Yi MD  [facility] Psychiatry

## 2021-03-10 NOTE — PLAN OF CARE
Patient has been in and out of her room. She paced in the hallway multiple times with headphones on. She is social with a few peers. She is calm and pleasant with staff. Denies all MH symptoms. Medication compliant. Denies medication SE, denies pain. Slept okay. Eating okay. Patient has no concerns this shift. Staff will continue to monitor.

## 2021-03-10 NOTE — PLAN OF CARE
Work Completed:  Met with the patient who spoke to her  Warner Stallworth who was surprised she was still in the hospital and had not gone to CARE by now.  Alicia explained to him that she is on a very long wait. He planned to discuss the situation with the other Co Attys in [place] to see if we could come up with a different plan.  I left another message with Jessica Kong requesting a call back to discuss the CARE wait-list situation since patient could be here for months and  Central Preadmission CPA will no longer even give the hospital a ballpark timeline on when she will get a bed.      [facility] MOHINDER Kong called back and plans to discuss this case with her Supervisor because the patient's current Order commits [facility].  She also now aware that Alicia has spoken to her  about being on a list with no means of knowing when she can transfer out of this hospital.  Co MOHINDER will call me back.  I will discuss with Dr. FARRELL whether he would be comfortable changing the commitment so patient could possibly go to an unlocked MICD program rather than just CARE.      Discharge plan or goal:   Patient committed as MICD with Carrillo on on wait list for CARE.  Patient has reached her baseline and is cooperative with medications.                Barriers to discharge:    Patient needs placement in a CARE Program.

## 2021-03-10 NOTE — PROVIDER NOTIFICATION
03/10/21 0606   Sleep/Rest/Relaxation   Sleep/Rest/Relaxation (WDL) Ex   Sleep/Rest/Relaxation difficulty falling asleep;other (see comment)  (getting up for snacks)   Night Time # Hours 5 hours     Patient has a difficulty in falling asleep during the night. Requested snacks to eat and was given to her. She slept for 5 hours this shift. No PRN meds needed.

## 2021-03-11 PROCEDURE — 250N000013 HC RX MED GY IP 250 OP 250 PS 637: Performed by: PSYCHIATRY & NEUROLOGY

## 2021-03-11 PROCEDURE — 250N000013 HC RX MED GY IP 250 OP 250 PS 637: Performed by: STUDENT IN AN ORGANIZED HEALTH CARE EDUCATION/TRAINING PROGRAM

## 2021-03-11 PROCEDURE — 99231 SBSQ HOSP IP/OBS SF/LOW 25: CPT | Performed by: PSYCHIATRY & NEUROLOGY

## 2021-03-11 PROCEDURE — 124N000002 HC R&B MH UMMC

## 2021-03-11 RX ADMIN — NICOTINE POLACRILEX 4 MG: 2 GUM, CHEWING ORAL at 10:54

## 2021-03-11 RX ADMIN — METFORMIN HYDROCHLORIDE 500 MG: 500 TABLET, FILM COATED ORAL at 17:58

## 2021-03-11 RX ADMIN — LAMOTRIGINE 100 MG: 100 TABLET ORAL at 09:04

## 2021-03-11 RX ADMIN — BENZTROPINE MESYLATE 1 MG: 1 TABLET ORAL at 09:04

## 2021-03-11 RX ADMIN — NICOTINE POLACRILEX 4 MG: 2 GUM, CHEWING ORAL at 16:19

## 2021-03-11 RX ADMIN — PROPRANOLOL HYDROCHLORIDE 20 MG: 20 TABLET ORAL at 19:00

## 2021-03-11 RX ADMIN — METFORMIN HYDROCHLORIDE 500 MG: 500 TABLET, FILM COATED ORAL at 09:04

## 2021-03-11 RX ADMIN — NICOTINE POLACRILEX 4 MG: 2 GUM, CHEWING ORAL at 14:01

## 2021-03-11 RX ADMIN — BENZTROPINE MESYLATE 1 MG: 1 TABLET ORAL at 19:00

## 2021-03-11 RX ADMIN — OLANZAPINE 15 MG: 10 TABLET, ORALLY DISINTEGRATING ORAL at 09:04

## 2021-03-11 RX ADMIN — NICOTINE POLACRILEX 4 MG: 2 GUM, CHEWING ORAL at 15:10

## 2021-03-11 RX ADMIN — TOPIRAMATE 25 MG: 25 TABLET, FILM COATED ORAL at 19:00

## 2021-03-11 RX ADMIN — TOPIRAMATE 25 MG: 25 TABLET, FILM COATED ORAL at 09:04

## 2021-03-11 RX ADMIN — OLANZAPINE 15 MG: 10 TABLET, ORALLY DISINTEGRATING ORAL at 19:00

## 2021-03-11 RX ADMIN — PROPRANOLOL HYDROCHLORIDE 20 MG: 20 TABLET ORAL at 09:04

## 2021-03-11 RX ADMIN — PROPRANOLOL HYDROCHLORIDE 20 MG: 20 TABLET ORAL at 13:56

## 2021-03-11 RX ADMIN — NICOTINE POLACRILEX 4 MG: 2 GUM, CHEWING ORAL at 19:32

## 2021-03-11 RX ADMIN — NICOTINE POLACRILEX 4 MG: 2 GUM, CHEWING ORAL at 18:16

## 2021-03-11 RX ADMIN — LEVONORGESTREL AND ETHINYL ESTRADIOL 1 TABLET: KIT at 09:05

## 2021-03-11 ASSESSMENT — ACTIVITIES OF DAILY LIVING (ADL)
ORAL_HYGIENE: INDEPENDENT
DRESS: SCRUBS (BEHAVIORAL HEALTH);INDEPENDENT
LAUNDRY: WITH SUPERVISION
HYGIENE/GROOMING: HANDWASHING;INDEPENDENT
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
## 2021-03-11 NOTE — PLAN OF CARE
"Pt's affect is bland, her mood is \"ok.\" She was up for bkfst, and is back in bed. See PCS for CP review.    1423) Pt slept in this am; then went back to bed again, to nap. Up for nicotine gum. Needs prompting for meds, and is forgetful. Keeps mainly to self.  "

## 2021-03-11 NOTE — PLAN OF CARE
Work Completed:  Patient cooperative with all care.  Taking Medications. Discussed a possible new avenue for treatment in a residential program rather than CARE due to the long and unknown wait...  But our issue is that the [place] Order states she is committed to [facility] only in which case the hospital has no jurisdiction to try and change the Order to include the hospital.  Maurice Kong discussing this case with the [facility] Attys. No word yet.    Discharge plan or goal:   Patient will enter MICD treatment at CARE as the Order stands now.                Barriers to discharge:    Patient needs placement.

## 2021-03-11 NOTE — PROGRESS NOTES
[facility], [place]   Psychiatric Progress Note    Interim History:  The patient's care was discussed with the treatment team during the daily team meeting and/or staff's chart notes were reviewed.  Overnight staff report Pt has been social in the milieu. Affect is full range. She denied all MH symptoms including SI/SIB/HI/AVH, depression. Had no complaints, going to groups. Normal vitals. Said that she was bored with being at this hospital and talked to her  about being at this hospital for too long time. She also met with [facility] and discussed possible changes in conditions of her commitment to options other than CARE, see [facility]'s note below:    Patient cooperative with all care.  Taking Medications. Discussed a possible new avenue for treatment in a residential program rather than CARE due to the long and unknown wait...  But our issue is that the [facility] Order states she is committed to [facility] only in which case the hospital has no jurisdiction to try and change the Order to include the hospital.  [facility] Leatha Kong discussing this case with the [facility] Attys. No word yet.    In interview was seen in the patients' lounge. Denied having any concerns other than being frustrated with being at this hospital for too long time. Said that she was hoping that transfer to less restrictive CD treatment program could be approved and she would go there. I encouraged her to talk to [facility] which she did. Alicia had no further questions or concerns.   Patient Active Problem List   Diagnosis     Polysubstance abuse (H)     Tobacco abuse     Generalized anxiety disorder     Bipolar depression (H)     Migraines     Dysmenorrhea     CARDIOVASCULAR SCREENING; LDL GOAL LESS THAN 160     Insomnia     Paranoia (psychosis) (H)     MENTAL HEALTH     Carley (H)     Agitation     Aggression       Medications:    benztropine  1 mg Oral BID     fluticasone  1 puff Inhalation Daily      "lamoTRIgine  100 mg Oral Daily     levonorgestrel-ethinyl estradiol  1 tablet Oral Daily     metFORMIN  500 mg Oral BID w/meals     OLANZapine zydis  15 mg Oral BID    Or     OLANZapine  10 mg Intramuscular BID     propranolol  20 mg Oral TID     topiramate  25 mg Oral Q12H YELENA      Allergies:  Allergies   Allergen Reactions     Nkda [No Known Drug Allergies]       Labs:  No results found for this or any previous visit (from the past 24 hour(s)).    Psychiatric Examination:    /70   Pulse 88   Temp 98.3  F (36.8  C)   Resp 16   Wt 69.9 kg (154 lb)   SpO2 98%   BMI 24.12 kg/m    Weight is 154 lbs 0 oz  Body mass index is 24.12 kg/m .     Orthostatic Vitals       Most Recent      Sitting Orthostatic /62 03/11 1203    Sitting Orthostatic Pulse (bpm) 87 03/11 1203    Standing Orthostatic /79 03/10 0836    Standing Orthostatic Pulse (bpm) 108 03/10 0836         Appearance: dressed in scrubs  General behavior: Cooperative  Eye Contact: Fair   Gait and Motor Coordination: Normal gait and motor coordination  Speech: WNL  Language: Normal  Attention/Concentration: Fair  Orientation: oriented to time, place and person  Thought Process:  linear, somewhat concrete  Thought Content:  no evidence of suicidal ideation or homicidal ideation and no evidence of psychotic thought  Recent and Remote Memory: improved  Associations: no loose associations  Mood: \"I am OK\"  Affect: full range  Fund of knowledge: appropriate to education and experiences  Demonstration of insight/judgment: poor, but improving   Suicidal risk: Low      Clinical Global Impressions  First:  Considering your total clinical experience with this particular patient population, how severe are the patient's symptoms at this time?: 6 (01/21/21 1437)  Compared to the patient's condition at the START of treatment, this patient's condition is: 3 (01/21/21 1437)  Most recent:  Considering your total clinical experience with this particular patient " population, how severe are the patient's symptoms at this time?: 3 (3/8/2021)  Compared to the patient's condition at the START of treatment, this patient's condition is: 2 (3/8/2021)    Precautions:  Behavioral Orders   Procedures     Assault precautions     Code 1 - Restrict to Unit     Code 1 - Restrict to Unit     Elopement precautions     Fall precautions     Routine Programming     As clinically indicated     Status 15     Every 15 minutes.       Diagnoses:  Bipolar 1 Disorder, most recent episode manic, currently recovered  Amphetamine Use Disorder  Opioid Use Disorder    Assessment/Plan:    26 yo female with bipolar disorder and severe substance use problem.  Stabilized on Lamictal and Zyprexa.   Poor insight.     1. Continue hospitalization for safety and stabilization.  2. Continue Zyprexa and Lamictal, the lamictal to 100 mg. Started on Topamax yesterday to offset weight gain.  3. Awaiting placement to CARE vs less restrictive Chemical dependency treatment facility  4. Expect 7 - 15 days    Reason for ongoing admission: is unable to care for self due to high risk of relapse, committed and waiting for a bed at CARE unit.   Discharge location: CARE vs less restrictive Chemical dependency treatment facility  Discharge Medications: not ordered  Follow-up Appointments: not scheduled  Legal Status: full commitment and Carrillo (Zyprexa, Haldol, Risperidone, Abilify)    Willy Yi MD  [facility] Psychiatry

## 2021-03-11 NOTE — PLAN OF CARE
Patient has been calm most of the shift. She started the shift pacing the hallway then she sat in the lounge till about dinner time. After dinner, patient went to her room where she stayed till about 2045. She came out to request her night time medication. Patients vitals are WNL. She denies SI, SIB, HI or hallucinations. She has been eating and sleeping adequately. She is also medication compliant.

## 2021-03-12 PROCEDURE — 250N000013 HC RX MED GY IP 250 OP 250 PS 637: Performed by: PSYCHIATRY & NEUROLOGY

## 2021-03-12 PROCEDURE — 99232 SBSQ HOSP IP/OBS MODERATE 35: CPT | Performed by: PSYCHIATRY & NEUROLOGY

## 2021-03-12 PROCEDURE — 250N000013 HC RX MED GY IP 250 OP 250 PS 637: Performed by: STUDENT IN AN ORGANIZED HEALTH CARE EDUCATION/TRAINING PROGRAM

## 2021-03-12 PROCEDURE — H2032 ACTIVITY THERAPY, PER 15 MIN: HCPCS

## 2021-03-12 PROCEDURE — 124N000002 HC R&B MH UMMC

## 2021-03-12 RX ORDER — TOPIRAMATE 50 MG/1
50 TABLET, FILM COATED ORAL EVERY 12 HOURS SCHEDULED
Status: DISCONTINUED | OUTPATIENT
Start: 2021-03-12 | End: 2021-03-24 | Stop reason: HOSPADM

## 2021-03-12 RX ADMIN — TOPIRAMATE 50 MG: 50 TABLET ORAL at 20:13

## 2021-03-12 RX ADMIN — PROPRANOLOL HYDROCHLORIDE 20 MG: 20 TABLET ORAL at 13:59

## 2021-03-12 RX ADMIN — FLUTICASONE FUROATE 1 PUFF: 100 POWDER RESPIRATORY (INHALATION) at 07:39

## 2021-03-12 RX ADMIN — METFORMIN HYDROCHLORIDE 500 MG: 500 TABLET, FILM COATED ORAL at 07:39

## 2021-03-12 RX ADMIN — NICOTINE POLACRILEX 4 MG: 2 GUM, CHEWING ORAL at 20:15

## 2021-03-12 RX ADMIN — NICOTINE POLACRILEX 2 MG: 2 GUM, CHEWING ORAL at 15:01

## 2021-03-12 RX ADMIN — LAMOTRIGINE 100 MG: 100 TABLET ORAL at 07:39

## 2021-03-12 RX ADMIN — NICOTINE POLACRILEX 2 MG: 2 GUM, CHEWING ORAL at 13:59

## 2021-03-12 RX ADMIN — OLANZAPINE 15 MG: 10 TABLET, ORALLY DISINTEGRATING ORAL at 20:13

## 2021-03-12 RX ADMIN — NICOTINE POLACRILEX 4 MG: 2 GUM, CHEWING ORAL at 16:27

## 2021-03-12 RX ADMIN — BENZTROPINE MESYLATE 1 MG: 1 TABLET ORAL at 07:40

## 2021-03-12 RX ADMIN — BENZTROPINE MESYLATE 1 MG: 1 TABLET ORAL at 20:13

## 2021-03-12 RX ADMIN — LEVONORGESTREL AND ETHINYL ESTRADIOL 1 TABLET: KIT at 07:39

## 2021-03-12 RX ADMIN — NICOTINE POLACRILEX 2 MG: 2 GUM, CHEWING ORAL at 07:36

## 2021-03-12 RX ADMIN — PROPRANOLOL HYDROCHLORIDE 20 MG: 20 TABLET ORAL at 07:39

## 2021-03-12 RX ADMIN — OLANZAPINE 15 MG: 10 TABLET, ORALLY DISINTEGRATING ORAL at 07:39

## 2021-03-12 RX ADMIN — TOPIRAMATE 25 MG: 25 TABLET, FILM COATED ORAL at 07:40

## 2021-03-12 RX ADMIN — METFORMIN HYDROCHLORIDE 500 MG: 500 TABLET, FILM COATED ORAL at 17:55

## 2021-03-12 RX ADMIN — PROPRANOLOL HYDROCHLORIDE 20 MG: 20 TABLET ORAL at 20:12

## 2021-03-12 ASSESSMENT — ACTIVITIES OF DAILY LIVING (ADL)
LAUNDRY: WITH SUPERVISION
DRESS: SCRUBS (BEHAVIORAL HEALTH)
ORAL_HYGIENE: INDEPENDENT
LAUNDRY: WITH SUPERVISION
DRESS: SCRUBS (BEHAVIORAL HEALTH)
ORAL_HYGIENE: INDEPENDENT
HYGIENE/GROOMING: INDEPENDENT
HYGIENE/GROOMING: INDEPENDENT

## 2021-03-12 NOTE — PLAN OF CARE
Problem: Behavior Regulation Impairment (Disruptive Behavior)  Goal: Improved Impulse and Aggression Control (Disruptive Behavior)  Outcome: Improving  Flowsheets (Taken 3/12/2021 1194)  Mutually Determined Action Steps (Improved Impulse and Aggression Control):   seeks healthy outlet for aggression   verbalizes insight re: need for meds     SI/Self harm:  pt denies  Aggression/agitation/HI:  none reported or observed  AVH:  none  Sleep: no concerns, sleeping well  PRN Med: nicotine gum x2  Medication AE: none reported, though pt does appear to shift on her feet frequently, appears restless at times  Physical Complaints/Issues: pt denies  I & O: eating and drinking well  ADLs: independent  Vitals:  stable and WNL  COVID 19 Assessment:  pt tested negative, shows no s/s of COVID19 infection  Milieu Participation: minimal. Not attending groups, isolative to room most of shift  Behavior: overall calm and controlled, pleasant and cooperative in interactions    Pt appears sleepy, states she feels tired but is otherwise doing well. Pt denies any concerns.   Nursing will continue to monitor and assess

## 2021-03-12 NOTE — PROGRESS NOTES
Pt participated in dance/movement therapy (DMT) that was initially very free flowing with personal expression, then strong and organizing.  Pt requested a familiar song and sought comfort through this familiarity.  She sat more frequently, with movements more grounded than in previous sessions. For example, downward movement, seeming to seek the solid floor as well as resonance of her own body.  She was friendly, but slightly disconnected in her social engagement.      She demonstrated behaviors that were considerably less manic than previous sessions with this therapist, though she still seemed to be seeking a solid anchor, or place to hold onto.  Symptoms improving.       03/12/21 1414   Dance Movement Therapy   Type of Intervention structured groups   Response participates, initiates socially appropriate   Hours 1

## 2021-03-12 NOTE — PLAN OF CARE
Patient has been calm and cooperative this shift. Vitals are WNL. She denies any mental health symptoms. She has been in and out of her room during the shift but was in bed by 1930 after receiving her night time medication. She keeps to herself while in the lounge but social with select peers. RN will continue to monitor

## 2021-03-12 NOTE — PROGRESS NOTES
[facility], [place]   Psychiatric Progress Note    Interim History:  The patient's care was discussed with the treatment team during the daily team meeting and/or staff's chart notes were reviewed.  Overnight staff report Pt has been social in the milieu. Affect is full range. She denied all MH symptoms including SI/SIB/HI/AVH, depression. Had no complaints, going to groups. Normal vitals. Said that she was bored with being at this hospital and talked to her  about being at this hospital for too long time. She also met with [facility] and discussed possible changes in conditions of her commitment to options other than CARE, see [facility]'s note below:    Called and spoke with July, staff at [facility].  She still can give no information as to how long our patient will sit in hospital waiting for a bed in one of the CARE Programs.  She said Covid-19 is causing some issues with accepting to certain facilities and they continue to get IV drug users, revoked PD's etc who all slide in on a separate list ahead of everyone else.  Thanked her again for the information.       Had call back from Maurice Kong who talked to her Supervisor and also to patient's Defense Atty Warner.  They all agree a new plan to look at an IRTS facility should be considered.  [facility] and this writer talked about referring her to [facility], [facility] and [facility].  This writer left a message for Maurice Keita Atty Kris Perdoza ([phone] and Cell [phone]) asking if we need to put the name of [facility] on the Commitment Order or would the Statute allow us now to PD patient to an IRTS if and when we can get her in?  Awaiting call back from Kris. Called LICSW Karla Infante at [facility] and she said that the hopsital can now PD the patient as an alternative, to an IRTS if she gets accepted.  We just let [facility] know and they take her off of their list.  Met with the patient  to update her.     In interview was seen in the patients' lounge. Denied having any concerns other than being frustrated with being at this hospital for too long time and weight gain. Asked me again about being switched to Abilify. I spent some time talking to Alicia about how stable her Bipolar affective disorder is on Zyprexa and risk of decompensation and delay in her discharge if we start switching her to Abilify. Reminded her about Topamax which was started short time ago to help to offset weight gain. Alicia was somewhat receptive to my words and had no further questions or concerns. After visiting with Alicia I had 15 min long phone conversation with her mother Michi. We discussed mother's concerns, Alicia's meds, reasons for keeping Zyprexa (patient's stability) and my suggestions to deal with weight gain. I informed mother that weight gain is common with Zyprexa, advised her about other possible side effects of it, including, but not limited to hyperglycemia. Mother was thankful for call, receptive to my words and had no further questions. I informed her that starting Monday her daughter's care will be taken over by Dr. Proctor.     Patient Active Problem List   Diagnosis     Polysubstance abuse (H)     Tobacco abuse     Generalized anxiety disorder     Bipolar depression (H)     Migraines     Dysmenorrhea     CARDIOVASCULAR SCREENING; LDL GOAL LESS THAN 160     Insomnia     Paranoia (psychosis) (H)     MENTAL HEALTH     Carley (H)     Agitation     Aggression       Medications:    benztropine  1 mg Oral BID     fluticasone  1 puff Inhalation Daily     lamoTRIgine  100 mg Oral Daily     levonorgestrel-ethinyl estradiol  1 tablet Oral Daily     metFORMIN  500 mg Oral BID w/meals     OLANZapine zydis  15 mg Oral BID    Or     OLANZapine  10 mg Intramuscular BID     propranolol  20 mg Oral TID     topiramate  50 mg Oral Q12H YELENA      Allergies:  Allergies   Allergen Reactions     Nkda [No Known Drug Allergies]  "      Labs:  No results found for this or any previous visit (from the past 24 hour(s)).    Psychiatric Examination:    /77   Pulse 78   Temp 97.6  F (36.4  C) (Oral)   Resp 16   Wt 69.9 kg (154 lb)   SpO2 99%   BMI 24.12 kg/m    Weight is 154 lbs 0 oz  Body mass index is 24.12 kg/m .     Orthostatic Vitals       Most Recent      Sitting Orthostatic /72 03/12 1358    Sitting Orthostatic Pulse (bpm) 92 03/12 1358    Standing Orthostatic BP 94/64 03/12 0739    Standing Orthostatic Pulse (bpm) 91 03/12 0739        Appearance: dressed in scrubs  General behavior: Cooperative  Eye Contact: Fair   Gait and Motor Coordination: Normal gait and motor coordination  Speech: WNL  Language: Normal  Attention/Concentration: Fair  Orientation: oriented to time, place and person  Thought Process:  linear, somewhat concrete  Thought Content:  no evidence of suicidal ideation or homicidal ideation and no evidence of psychotic thought  Recent and Remote Memory: improved  Associations: no loose associations  Mood: \"I am OK\". Slightly irritable today.  Affect: full range  Fund of knowledge: appropriate to education and experiences  Demonstration of insight/judgment: poor, but improving   Suicidal risk: Low      Clinical Global Impressions  First:  Considering your total clinical experience with this particular patient population, how severe are the patient's symptoms at this time?: 6 (01/21/21 1437)  Compared to the patient's condition at the START of treatment, this patient's condition is: 3 (01/21/21 1437)  Most recent:  Considering your total clinical experience with this particular patient population, how severe are the patient's symptoms at this time?: 3 (3/8/2021)  Compared to the patient's condition at the START of treatment, this patient's condition is: 2 (3/8/2021)    Precautions:  Behavioral Orders   Procedures     Assault precautions     Code 1 - Restrict to Unit     Code 1 - Restrict to Unit     Elopement " precautions     Fall precautions     Routine Programming     As clinically indicated     Status 15     Every 15 minutes.       Diagnoses:  Bipolar 1 Disorder, most recent episode manic, currently recovered  Amphetamine Use Disorder  Opioid Use Disorder    Assessment/Plan:    26 yo female with bipolar disorder and severe substance use problem.  Stabilized on Lamictal and Zyprexa.   Poor insight.     1. Continue hospitalization for safety and stabilization.  2. Continue Zyprexa and Lamictal, the lamictal to 100 mg. Will increase Topamax dose to offset weight gain. Will ask for nutritionist consult.  3. Awaiting placement to CARE vs less restrictive Chemical dependency treatment facility  4. Expect 7 - 15 days    Reason for ongoing admission: is unable to care for self due to high risk of relapse, committed and waiting for a bed at CARE unit.   Discharge location: CARE vs less restrictive Chemical dependency treatment facility  Discharge Medications: not ordered  Follow-up Appointments: not scheduled  Legal Status: full commitment and Carrillo (Zyprexa, Haldol, Risperidone, Abilify)    Willy Yi MD  [facility] Psychiatry

## 2021-03-12 NOTE — PLAN OF CARE
Work Completed:  Patient social in the milieu. Attends programming.  Cooperative with all care and staff directives.  Takes all medications.  Contracts for safety.  Talked to her Defense Atty this week who is looking into changing the MICD Commitment Order so she can enter another residential MICD Treatment Program rather than sit on a wait-list at [facility] waiting for a CARE Program with no knowledge as to how long their list is or any [facility] idea of when she might be able to transfer. This writer called her Maurice Zamudio who is also looking into getting the commit order changed.  Patient's mother called and she cannot have patient living with her but sees marked improvement in patient and is agreeable to her going to a residential program again if [facility] cannot get her daughter into treatment in a timely manner. Mother glad she is doing so much better and that patient is safe here but does not feel is not fair to the hospital or her daughter to just sit no longer needing acute care and not knowing when a CARE bed would open up.      Called and spoke with July, staff at [facility].  She still can give no information as to how long our patient will sit in hospital waiting for a bed in one of the CARE Programs.  She said Covid-19 is causing some issues with accepting to certain facilities and they continue to get IV drug users, revoked PD's etc who all slide in on a separate list ahead of everyone else.  Thanked her again for the information.      Had call back from Maurice Kong who talked to her Supervisor and also to patient's Defense Atty Warner.  They all agree a new plan to look at an IRTS facility should be considered.  [facility] and this writer talked about referring her to [facility], [facility] and [facility].  This writer left a message for Maurice Pedroza ([phone] and Cell [phone]) asking if we need to put the name of  [facility] on the Commitment Order or would the Statute allow us now to PD patient to an IRTS if and when we can get her in?  Awaiting call back from Kris. Called LICLAUREANO Infante at [facility] and she said that the hopsital can now PD the patient as an alternative, to an IRTS if she gets accepted.  We just let [facility] know and they take her off of their list.  Met with the patient to update her.      Discharge plan or goal:   As of today, patient will wait and enter a CARE Program but now have authority to refer and PD her to an IRTS if that opens up sooner.                Barriers to discharge:   Maurice EDMOND Commit to [facility] to CARE Program or an IRTS facility.

## 2021-03-13 PROCEDURE — 250N000013 HC RX MED GY IP 250 OP 250 PS 637: Performed by: PSYCHIATRY & NEUROLOGY

## 2021-03-13 PROCEDURE — 124N000002 HC R&B MH UMMC

## 2021-03-13 PROCEDURE — 250N000013 HC RX MED GY IP 250 OP 250 PS 637: Performed by: STUDENT IN AN ORGANIZED HEALTH CARE EDUCATION/TRAINING PROGRAM

## 2021-03-13 RX ADMIN — NICOTINE POLACRILEX 4 MG: 2 GUM, CHEWING ORAL at 15:06

## 2021-03-13 RX ADMIN — BENZTROPINE MESYLATE 1 MG: 1 TABLET ORAL at 21:00

## 2021-03-13 RX ADMIN — NICOTINE POLACRILEX 4 MG: 2 GUM, CHEWING ORAL at 09:01

## 2021-03-13 RX ADMIN — PROPRANOLOL HYDROCHLORIDE 20 MG: 20 TABLET ORAL at 21:00

## 2021-03-13 RX ADMIN — NICOTINE POLACRILEX 4 MG: 2 GUM, CHEWING ORAL at 16:40

## 2021-03-13 RX ADMIN — TOPIRAMATE 50 MG: 50 TABLET ORAL at 21:00

## 2021-03-13 RX ADMIN — BENZTROPINE MESYLATE 1 MG: 1 TABLET ORAL at 09:02

## 2021-03-13 RX ADMIN — NICOTINE POLACRILEX 4 MG: 2 GUM, CHEWING ORAL at 12:39

## 2021-03-13 RX ADMIN — PROPRANOLOL HYDROCHLORIDE 20 MG: 20 TABLET ORAL at 09:15

## 2021-03-13 RX ADMIN — NICOTINE POLACRILEX 4 MG: 2 GUM, CHEWING ORAL at 13:55

## 2021-03-13 RX ADMIN — LAMOTRIGINE 100 MG: 100 TABLET ORAL at 09:02

## 2021-03-13 RX ADMIN — METFORMIN HYDROCHLORIDE 500 MG: 500 TABLET, FILM COATED ORAL at 17:47

## 2021-03-13 RX ADMIN — OLANZAPINE 15 MG: 10 TABLET, ORALLY DISINTEGRATING ORAL at 21:00

## 2021-03-13 RX ADMIN — FLUTICASONE FUROATE 1 PUFF: 100 POWDER RESPIRATORY (INHALATION) at 09:05

## 2021-03-13 RX ADMIN — METFORMIN HYDROCHLORIDE 500 MG: 500 TABLET, FILM COATED ORAL at 09:03

## 2021-03-13 RX ADMIN — TOPIRAMATE 50 MG: 50 TABLET ORAL at 09:02

## 2021-03-13 RX ADMIN — PROPRANOLOL HYDROCHLORIDE 20 MG: 20 TABLET ORAL at 13:56

## 2021-03-13 RX ADMIN — NICOTINE POLACRILEX 4 MG: 2 GUM, CHEWING ORAL at 11:29

## 2021-03-13 RX ADMIN — LEVONORGESTREL AND ETHINYL ESTRADIOL 1 TABLET: KIT at 09:04

## 2021-03-13 RX ADMIN — OLANZAPINE 15 MG: 10 TABLET, ORALLY DISINTEGRATING ORAL at 09:01

## 2021-03-13 RX ADMIN — NICOTINE POLACRILEX 4 MG: 2 GUM, CHEWING ORAL at 17:48

## 2021-03-13 ASSESSMENT — ACTIVITIES OF DAILY LIVING (ADL)
ORAL_HYGIENE: INDEPENDENT
HYGIENE/GROOMING: INDEPENDENT
DRESS: INDEPENDENT
ORAL_HYGIENE: INDEPENDENT
LAUNDRY: WITH SUPERVISION
HYGIENE/GROOMING: INDEPENDENT
DRESS: INDEPENDENT

## 2021-03-13 NOTE — PLAN OF CARE
Pt participated in a structured Therapeutic Recreation group with a focus on leisure participation, stress reduction, and social engagement via an active group game. Pt participated in the game for approximately x15 minutes before leaving. Pt stated her anxiety was rising so she decided to step out. While she was in the group. she was active in the tossing large, foam dice for the activity.

## 2021-03-13 NOTE — PLAN OF CARE
Pt was a wake x2 for a total of 45 minutes.  Otherwise appeared to be a sleep @ all safety checks.

## 2021-03-13 NOTE — PLAN OF CARE
Patient has been isolative to her room. She came out for dinner and made a call then she was back in her room. She denies any mental health symptoms. She didn't request her night time medication until 2005. She usually would request them by 1900. Her vitals were WNL. She went back to bed after receiving her medication and came out one time for a call at about 2045. RN will continue to monitor

## 2021-03-13 NOTE — PLAN OF CARE
ursing Assessment    Recent Vitals: B/P: 116/60, T: 97.6, P: 101, R: 16     Mental Status Exam:  Appearance:  No apparent distress, Disheveled, and Appears stated age  Behavior/relationship to examiner/demeanor:  Cooperative and Dismissive  Affect (objective appearance):  Blunted/Flat  Mood (subjective report):  flat  Gait:  Normal  Speech rate, volume, coherence:  Normal, Normal, Normal      Suicide Assessment:   Recent suicidal thoughts: No   Any attempts in the last 24 hours: No   Plan or considering various methods: No   Access to means: No   Verbal or Written contract for safety: Yes   Self Injurious Behavior: No      General Shift Summary  Pt mostly isolative in room except for meds and meals. Pt rated anxiety at 0/10 and depression at 0/10. Denies any SI/HI and contracts for safety. Denies any hallucinations. Rates pain at 0/10. Continue current POC.     Zeus Serrano RN

## 2021-03-14 PROCEDURE — 250N000013 HC RX MED GY IP 250 OP 250 PS 637: Performed by: STUDENT IN AN ORGANIZED HEALTH CARE EDUCATION/TRAINING PROGRAM

## 2021-03-14 PROCEDURE — 250N000013 HC RX MED GY IP 250 OP 250 PS 637: Performed by: PSYCHIATRY & NEUROLOGY

## 2021-03-14 PROCEDURE — 124N000002 HC R&B MH UMMC

## 2021-03-14 RX ADMIN — NICOTINE POLACRILEX 4 MG: 2 GUM, CHEWING ORAL at 17:37

## 2021-03-14 RX ADMIN — NICOTINE POLACRILEX 4 MG: 2 GUM, CHEWING ORAL at 18:41

## 2021-03-14 RX ADMIN — BENZTROPINE MESYLATE 1 MG: 1 TABLET ORAL at 19:18

## 2021-03-14 RX ADMIN — NICOTINE POLACRILEX 4 MG: 2 GUM, CHEWING ORAL at 13:15

## 2021-03-14 RX ADMIN — PROPRANOLOL HYDROCHLORIDE 20 MG: 20 TABLET ORAL at 14:09

## 2021-03-14 RX ADMIN — TOPIRAMATE 50 MG: 50 TABLET ORAL at 08:55

## 2021-03-14 RX ADMIN — PROPRANOLOL HYDROCHLORIDE 20 MG: 20 TABLET ORAL at 19:18

## 2021-03-14 RX ADMIN — METFORMIN HYDROCHLORIDE 500 MG: 500 TABLET, FILM COATED ORAL at 08:55

## 2021-03-14 RX ADMIN — LAMOTRIGINE 100 MG: 100 TABLET ORAL at 08:55

## 2021-03-14 RX ADMIN — OLANZAPINE 15 MG: 10 TABLET, ORALLY DISINTEGRATING ORAL at 08:55

## 2021-03-14 RX ADMIN — METFORMIN HYDROCHLORIDE 500 MG: 500 TABLET, FILM COATED ORAL at 18:03

## 2021-03-14 RX ADMIN — FLUTICASONE FUROATE 1 PUFF: 100 POWDER RESPIRATORY (INHALATION) at 08:56

## 2021-03-14 RX ADMIN — TOPIRAMATE 50 MG: 50 TABLET ORAL at 19:18

## 2021-03-14 RX ADMIN — LEVONORGESTREL AND ETHINYL ESTRADIOL 1 TABLET: KIT at 08:56

## 2021-03-14 RX ADMIN — NICOTINE POLACRILEX 4 MG: 2 GUM, CHEWING ORAL at 16:24

## 2021-03-14 RX ADMIN — OLANZAPINE 15 MG: 10 TABLET, ORALLY DISINTEGRATING ORAL at 20:41

## 2021-03-14 RX ADMIN — BENZTROPINE MESYLATE 1 MG: 1 TABLET ORAL at 08:55

## 2021-03-14 RX ADMIN — PROPRANOLOL HYDROCHLORIDE 20 MG: 20 TABLET ORAL at 08:55

## 2021-03-14 ASSESSMENT — ACTIVITIES OF DAILY LIVING (ADL)
HYGIENE/GROOMING: HANDWASHING;SHOWER;INDEPENDENT
DRESS: SCRUBS (BEHAVIORAL HEALTH);INDEPENDENT
LAUNDRY: WITH SUPERVISION
ORAL_HYGIENE: INDEPENDENT
LAUNDRY: WITH SUPERVISION
DRESS: INDEPENDENT
HYGIENE/GROOMING: INDEPENDENT
ORAL_HYGIENE: INDEPENDENT

## 2021-03-14 NOTE — PLAN OF CARE
"Pt is calm and pleasant, with blunted affect. Her mood is \"good.\" See PCS for CP review.   " Patient placed on continuous cardiac monitor, automatic blood pressure cuff and continuous pulse oximeter.

## 2021-03-14 NOTE — PLAN OF CARE
Patient has been isolative to her room for most of the shift. She came out for dinner, to request nicotine gum but stayed in her room otherwise. She was in the lounge for dinner but went to her room and has been napping since then. She missed a call and did not come for her meds at 1900 as she has done on previous shifts. SHe eventually came out at 2100 for snacks and her medication. Vitals are WNL. She denies SI, SIB, HI or hallucinations. fter snacks she went back to her room. RN will continue to monitor.

## 2021-03-14 NOTE — PLAN OF CARE
Problem: Behavioral Health Plan of Care  Goal: Plan of Care Review  Outcome: Improving  Flowsheets (Taken 3/14/2021 1732)  Plan of Care Reviewed With: patient  Patient Agreement with Plan of Care: agrees  1818 Pt has been visible in the milieu since the beginning of the shift.She has a flat affect but she's calm,pleasant and cooperative. Pt was social with select peers early on in the shift.She denied SI/HI/AVH/anxiety,depression and pain.Pt is med compliant and has no meds SE issues. Will cont to monitor.    2244 Pt got nicotine gum x3 . No concerns noted from her.

## 2021-03-15 PROCEDURE — 124N000002 HC R&B MH UMMC

## 2021-03-15 PROCEDURE — 250N000013 HC RX MED GY IP 250 OP 250 PS 637: Performed by: PSYCHIATRY & NEUROLOGY

## 2021-03-15 PROCEDURE — 250N000013 HC RX MED GY IP 250 OP 250 PS 637: Performed by: STUDENT IN AN ORGANIZED HEALTH CARE EDUCATION/TRAINING PROGRAM

## 2021-03-15 PROCEDURE — 99231 SBSQ HOSP IP/OBS SF/LOW 25: CPT | Performed by: PSYCHIATRY & NEUROLOGY

## 2021-03-15 RX ADMIN — NICOTINE POLACRILEX 4 MG: 2 GUM, CHEWING ORAL at 16:38

## 2021-03-15 RX ADMIN — LAMOTRIGINE 100 MG: 100 TABLET ORAL at 09:42

## 2021-03-15 RX ADMIN — NICOTINE POLACRILEX 4 MG: 2 GUM, CHEWING ORAL at 19:51

## 2021-03-15 RX ADMIN — PROPRANOLOL HYDROCHLORIDE 20 MG: 20 TABLET ORAL at 09:40

## 2021-03-15 RX ADMIN — LEVONORGESTREL AND ETHINYL ESTRADIOL 1 TABLET: KIT at 09:42

## 2021-03-15 RX ADMIN — FLUTICASONE FUROATE 1 PUFF: 100 POWDER RESPIRATORY (INHALATION) at 09:40

## 2021-03-15 RX ADMIN — METFORMIN HYDROCHLORIDE 500 MG: 500 TABLET, FILM COATED ORAL at 09:41

## 2021-03-15 RX ADMIN — BENZTROPINE MESYLATE 1 MG: 1 TABLET ORAL at 09:42

## 2021-03-15 RX ADMIN — TOPIRAMATE 50 MG: 50 TABLET ORAL at 09:42

## 2021-03-15 RX ADMIN — PROPRANOLOL HYDROCHLORIDE 20 MG: 20 TABLET ORAL at 19:48

## 2021-03-15 RX ADMIN — OLANZAPINE 15 MG: 10 TABLET, ORALLY DISINTEGRATING ORAL at 19:49

## 2021-03-15 RX ADMIN — OLANZAPINE 15 MG: 10 TABLET, ORALLY DISINTEGRATING ORAL at 09:41

## 2021-03-15 RX ADMIN — PROPRANOLOL HYDROCHLORIDE 20 MG: 20 TABLET ORAL at 14:19

## 2021-03-15 RX ADMIN — NICOTINE POLACRILEX 4 MG: 2 GUM, CHEWING ORAL at 09:40

## 2021-03-15 RX ADMIN — TOPIRAMATE 50 MG: 50 TABLET ORAL at 19:49

## 2021-03-15 RX ADMIN — BENZTROPINE MESYLATE 1 MG: 1 TABLET ORAL at 19:48

## 2021-03-15 RX ADMIN — NICOTINE POLACRILEX 4 MG: 2 GUM, CHEWING ORAL at 15:51

## 2021-03-15 RX ADMIN — METFORMIN HYDROCHLORIDE 500 MG: 500 TABLET, FILM COATED ORAL at 18:07

## 2021-03-15 RX ADMIN — NICOTINE POLACRILEX 4 MG: 2 GUM, CHEWING ORAL at 14:20

## 2021-03-15 ASSESSMENT — ACTIVITIES OF DAILY LIVING (ADL)
DRESS: SCRUBS (BEHAVIORAL HEALTH)
ORAL_HYGIENE: INDEPENDENT
HYGIENE/GROOMING: INDEPENDENT

## 2021-03-15 NOTE — PLAN OF CARE
"Nursing Assessment    Recent Vitals: B/P: 113/56, T: 98.2, P: 97, R: 16     Mental Status Exam:  Appearance:  Poorly groomed and Disheveled  Behavior/relationship to examiner/demeanor:  Dismissive and Passive  Affect (objective appearance):  Blunted/Flat  Mood (subjective report):  \"okay\" and calm  Gait:  Normal  Speech rate, volume, coherence:  Normal, Normal, Normal  Thought process (Rate):  Normal  Abnormal Perception:  None  Attention/Concentration:  Easily distracted,  Drowsy/sedated  Insight:  Poor  Judgment:  Poor    Suicide Assessment:   Recent suicidal thoughts: No   Any attempts in the last 24 hours: No   Plan or considering various methods: No   Access to means: No   Verbal or Written contract for safety: Yes   Self Injurious Behavior: No    General Shift Summary  Visible in milieu only during meals then withdraws to room and (doesn't) interact with peers.  Depression a level 4/10, anxiety #0/10,   No HI, or aggressive behaviors present.  Denies Auditory/visual hallucinations. No Signs of internal stimuli present.  Patient (is) medication compliant and reported no side effects are.  Plan is to continue to monitor patient status q 15 mins, assess response to medications, and maintain the patients safety.  Pt was isolative in room most of shift except for meals and medications. Pt encouraged to spend time in the milieu but declined. Denies any SI and contracts for safety. Denies any jennifer or discomfort. Continue current POC.     Zeus Serrano RN    "

## 2021-03-15 NOTE — PLAN OF CARE
Work Completed:  Spoke with Myriam Pyle Co Alie Richards who did not know that we could PD the patient if we were not on the comitment paperwork.  Had the Statute sent to me by Karla Infante [facility] on Friday.  We can refer Alicia to IRTS facilities and we can PD her from here.  Alicia also remains on the list for the CARE programs and if they happen to have a bed prior to an IRTS she will be transferred under commitment to CARE.  If she gets an IRTS bed first, we will PD here and I will notify Karla Infante so Alicia can be taken off of the CARE list.  Let Alicia know she has been referred to [facility], [facility], [facility], and [facility].  Hopefully, Alicia will get a bed at an IRTS much sooner than if she just sat and waited for CARE.  Alicia is attending all programming. Very social and engaged with peers and with this writer in discussing her treatment options.  Mother and Maurice Co onboard with and IRTS if she gets this sooner.     Discharge plan or goal:   Discharge to either an IRTS or CARE whichever has a bed first                Barriers to discharge:    Awaiting placement.
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
## 2021-03-15 NOTE — PROGRESS NOTES
[facility], [place]   Psychiatric Progress Note    Interim History:  The patient's care was discussed with the treatment team during the daily team meeting and/or staff's chart notes were reviewed.  she was reported to have a quiet weekend, was cooperative with meds and care. Per [facility], patient can, potentially, be discharged to one of IRTS facilities and was referred to a number of facilities, see [facility]'s note below:    Spoke with Myriam Pyle Co Alie Richards who did not know that we could PD the patient if we were not on the comitment paperwork.  Had the Statute sent to me by Karla Infante [facility] on Friday.  We can refer Alicia to IRTS facilities and we can PD her from here.  Alicia also remains on the list for the CARE programs and if they happen to have a bed prior to an IRTS she will be transferred under commitment to CARE.  If she gets an IRTS bed first, we will PD here and I will notify Karla Infante so Alicia can be taken off of the CARE list.  Let Alicia know she has been referred to [facility], [facility], [facility], and [facility].  Hopefully, Alicia will get a bed at an IRTS much sooner than if she just sat and waited for CARE.  Alicia is attending all programming. Very social and engaged with peers and with this writer in discussing her treatment options.  Mother and Maurice Co onboard with and IRTS if she gets this sooner.     In interview was seen in her room. Was sleepy, but woke up and talk to me. Said that she was happy to hear that she could, possibly, go to IRTS instead of waiting for a bed at one of CARE units. Said that her weekend went OK. In informed Alicia about my phone talk with her mother last Friday, encouraged her to spend more time outside of her room and attend groups. She was receptive to my words and promised to get out of bed soon. She denied Suicidal ideation, Homicidal thoughts, Auditory hallucinations and Visual hallucinations and  had no further questions or concerns.     Patient Active Problem List   Diagnosis     Polysubstance abuse (H)     Tobacco abuse     Generalized anxiety disorder     Bipolar depression (H)     Migraines     Dysmenorrhea     CARDIOVASCULAR SCREENING; LDL GOAL LESS THAN 160     Insomnia     Paranoia (psychosis) (H)     MENTAL HEALTH     Carley (H)     Agitation     Aggression       Medications:    benztropine  1 mg Oral BID     fluticasone  1 puff Inhalation Daily     lamoTRIgine  100 mg Oral Daily     levonorgestrel-ethinyl estradiol  1 tablet Oral Daily     metFORMIN  500 mg Oral BID w/meals     OLANZapine zydis  15 mg Oral BID    Or     OLANZapine  10 mg Intramuscular BID     propranolol  20 mg Oral TID     topiramate  50 mg Oral Q12H YELENA      Allergies:  Allergies   Allergen Reactions     Nkda [No Known Drug Allergies]       Labs:  No results found for this or any previous visit (from the past 24 hour(s)).    Psychiatric Examination:    /64   Pulse 87   Temp 98.2  F (36.8  C) (Oral)   Resp 16   Wt 69.9 kg (154 lb)   SpO2 98%   BMI 24.12 kg/m    Weight is 154 lbs 0 oz  Body mass index is 24.12 kg/m .     Orthostatic Vitals       Most Recent      Lying Orthostatic /69 03/14 0906    Lying Orthostatic Pulse (bpm) 77 03/14 0906    Sitting Orthostatic /66 03/15 0937    Sitting Orthostatic Pulse (bpm) 94 03/15 0937    Standing Orthostatic /60 03/15 0937    Standing Orthostatic Pulse (bpm) 103 03/15 0937         Appearance: dressed in scrubs  General behavior: Cooperative  Eye Contact: Fair   Gait and Motor Coordination: Normal gait and motor coordination  Speech: WNL  Language: Normal  Attention/Concentration: Fair, slightly somnolent  Orientation: oriented to time, place and person  Thought Process:  linear, somewhat concrete  Thought Content:  no evidence of suicidal ideation or homicidal ideation and no evidence of psychotic thought  Recent and Remote Memory: improved  Associations: no  "loose associations  Mood: \"I am OK\".   Affect: full range  Fund of knowledge: appropriate to education and experiences  Demonstration of insight/judgment: poor, but improving   Suicidal risk: Low      Clinical Global Impressions  First:  Considering your total clinical experience with this particular patient population, how severe are the patient's symptoms at this time?: 6 (01/21/21 1437)  Compared to the patient's condition at the START of treatment, this patient's condition is: 3 (01/21/21 1437)  Most recent:  Considering your total clinical experience with this particular patient population, how severe are the patient's symptoms at this time?: 3 (3/15/2021)  Compared to the patient's condition at the START of treatment, this patient's condition is: 2 (3/15/2021)    Precautions:  Behavioral Orders   Procedures     Assault precautions     Code 1 - Restrict to Unit     Code 1 - Restrict to Unit     Elopement precautions     Fall precautions     Routine Programming     As clinically indicated     Status 15     Every 15 minutes.       Diagnoses:  Bipolar 1 Disorder, most recent episode manic, currently recovered  Amphetamine Use Disorder  Opioid Use Disorder    Assessment/Plan:    24 yo female with bipolar disorder and severe substance use problem.  Stabilized on Lamictal and Zyprexa.   Poor insight.     1. Continue hospitalization for safety and stabilization.  2. Continue Zyprexa and Lamictal, the lamictal to 100 mg. Will continue the sameTopamax dose to offset weight gain.Awaiting placement to CARE vs less restrictive Chemical dependency treatment facility  3. Expect 7 - 15 days    Reason for ongoing admission: is unable to care for self due to high risk of relapse, committed and waiting for a bed at CARE unit vs IRTS, See discussion above.   Discharge location: CARE vs less restrictive Chemical dependency treatment facility vs IRTS.  Discharge Medications: not ordered  Follow-up Appointments: not scheduled  Legal " Status: full commitment and Carrillo (Zyprexa, Haldol, Risperidone, Abilify)    Wlily Yi MD  [facility] Psychiatry

## 2021-03-15 NOTE — PROVIDER NOTIFICATION
03/15/21 0600   Sleep/Rest/Relaxation   Sleep/Rest/Relaxation (WDL) WDL   Sleep/Rest/Relaxation appears asleep   Night Time # Hours 6.25 hours   Pt slept for 6.25 hrs so far.No concerning observation noted during the night.She snacked twice.No prn meds requested or given.The 15 mins safety checks cont.

## 2021-03-16 PROCEDURE — 250N000013 HC RX MED GY IP 250 OP 250 PS 637: Performed by: PSYCHIATRY & NEUROLOGY

## 2021-03-16 PROCEDURE — 124N000002 HC R&B MH UMMC

## 2021-03-16 PROCEDURE — 250N000013 HC RX MED GY IP 250 OP 250 PS 637: Performed by: STUDENT IN AN ORGANIZED HEALTH CARE EDUCATION/TRAINING PROGRAM

## 2021-03-16 PROCEDURE — 99232 SBSQ HOSP IP/OBS MODERATE 35: CPT | Mod: 95 | Performed by: PSYCHIATRY & NEUROLOGY

## 2021-03-16 PROCEDURE — H2032 ACTIVITY THERAPY, PER 15 MIN: HCPCS

## 2021-03-16 RX ADMIN — PROPRANOLOL HYDROCHLORIDE 20 MG: 20 TABLET ORAL at 19:03

## 2021-03-16 RX ADMIN — PROPRANOLOL HYDROCHLORIDE 20 MG: 20 TABLET ORAL at 14:23

## 2021-03-16 RX ADMIN — BENZTROPINE MESYLATE 1 MG: 1 TABLET ORAL at 08:42

## 2021-03-16 RX ADMIN — METFORMIN HYDROCHLORIDE 500 MG: 500 TABLET, FILM COATED ORAL at 17:46

## 2021-03-16 RX ADMIN — FLUTICASONE FUROATE 1 PUFF: 100 POWDER RESPIRATORY (INHALATION) at 08:42

## 2021-03-16 RX ADMIN — OLANZAPINE 15 MG: 10 TABLET, ORALLY DISINTEGRATING ORAL at 19:03

## 2021-03-16 RX ADMIN — NICOTINE POLACRILEX 4 MG: 2 GUM, CHEWING ORAL at 14:23

## 2021-03-16 RX ADMIN — LEVONORGESTREL AND ETHINYL ESTRADIOL 1 TABLET: KIT at 08:42

## 2021-03-16 RX ADMIN — METFORMIN HYDROCHLORIDE 500 MG: 500 TABLET, FILM COATED ORAL at 08:42

## 2021-03-16 RX ADMIN — TOPIRAMATE 50 MG: 50 TABLET ORAL at 19:03

## 2021-03-16 RX ADMIN — LAMOTRIGINE 100 MG: 100 TABLET ORAL at 08:42

## 2021-03-16 RX ADMIN — OLANZAPINE 15 MG: 10 TABLET, ORALLY DISINTEGRATING ORAL at 08:42

## 2021-03-16 RX ADMIN — PROPRANOLOL HYDROCHLORIDE 20 MG: 20 TABLET ORAL at 08:41

## 2021-03-16 RX ADMIN — TOPIRAMATE 50 MG: 50 TABLET ORAL at 08:42

## 2021-03-16 RX ADMIN — BENZTROPINE MESYLATE 1 MG: 1 TABLET ORAL at 19:03

## 2021-03-16 RX ADMIN — NICOTINE POLACRILEX 4 MG: 2 GUM, CHEWING ORAL at 16:35

## 2021-03-16 RX ADMIN — NICOTINE POLACRILEX 4 MG: 2 GUM, CHEWING ORAL at 18:02

## 2021-03-16 RX ADMIN — NICOTINE POLACRILEX 4 MG: 2 GUM, CHEWING ORAL at 19:03

## 2021-03-16 ASSESSMENT — ACTIVITIES OF DAILY LIVING (ADL)
LAUNDRY: WITH SUPERVISION
HYGIENE/GROOMING: INDEPENDENT
HYGIENE/GROOMING: INDEPENDENT
ORAL_HYGIENE: INDEPENDENT
ORAL_HYGIENE: INDEPENDENT
HYGIENE/GROOMING: INDEPENDENT
DRESS: SCRUBS (BEHAVIORAL HEALTH);INDEPENDENT
LAUNDRY: WITH SUPERVISION
DRESS: SCRUBS (BEHAVIORAL HEALTH)
DRESS: SCRUBS (BEHAVIORAL HEALTH);INDEPENDENT
LAUNDRY: WITH SUPERVISION
ORAL_HYGIENE: INDEPENDENT

## 2021-03-16 NOTE — PLAN OF CARE
Pt attended the structured Therapeutic Recreation group, participating in a group activity. Pt participated in group discussion, leisure participation, and social engagement to gain self-esteem, manage behaviors, improve social skills, decrease isolation, and reduce anxiety/depression.   Pt remained focused and engaged throughout group activity.  Pt mood was sociable and was appropriate with interactions,  contributing to the clues and descriptions throughout the activity. Pt occasionally had difficulty guessing the clue right away, but with extra hints, she stuck with it and continued with her turn until she correctly guessed the clue. Pt participated for half of the group leaving early.

## 2021-03-16 NOTE — PROGRESS NOTES
Work Completed:  Laura called after receiving the referrals for their two IRTS facilities.  They reviewed the records and will NOT take the patient with her history and feel she would be best served by a CARE program.  They do not want the liability if she were use drugs. In Team today, Dr. Proctor said he was not in favor of an IRTS for her with the significant, dangerous history she has exhibited in the past and should go to CARE.  Called LICSW Karla Infante at [facility] and they WILL keep patient on the CARE list.  Karla asked if she had ever been an IV drug user and, if so, they would put her up higher on the list. Will discuss this with patient.  We also discussed PDing her to residential drug treatment at an out of town placement like [facility] in [place] or [facility] in [place].  Karla feels the hospital would have huge liability if we were to PD patient to an IRTS because it is not drug treatment and then she overdosed as this would be completely out of the scope of the initial MICD commitment.  Left Maurice Co MOHINDER Kong a message to call me and discuss other treatment options and asked her if Maurice had any information that Alicia had a history of IV drug use.  Leatha called back and Alicia has no IV drug history which could bump her on the list for CARE.  She agrees to look at MICD Residential programs but said that many do not take patients who are PDd from commitment.  Updated Alicia about the issue with IRTS and she seemed to accept their reasoning for not wanting to take her.  Let her know [facility]  She did say she has been to [facility] and did complete the treatment there.     Discharge plan or goal:   Patient will remains on the list for CARE                Barriers to discharge:    Needs placement.

## 2021-03-16 NOTE — PLAN OF CARE
Patient has been calm and cooperative this shift. She was present in the milieu after dinner but briefly. Otherwise patient prefers to be in her room. She denies all mental health symptoms. Patient received nicotine gum about thrice this shift and took her night time medication with no concerns. Vitals are WNL.

## 2021-03-16 NOTE — PLAN OF CARE
Nursing Assessment    Recent Vitals: B/P: 113/75, T: 98.5, P: 92, R: 16     Sleep:  Hours of sleep at night: 5.5  Barriers: None    Goal No goal    General Shift Summary  Patient has isolated to her room for most of shift coming out for meals. She presents as withdrawn and guarded however is cooperative when talking to her, giving short responses. She did not attend groups or socialize with others. She denied SI/HI/AVH/SIB, anxiety or depression. She is medication compliant with no voiced side effects. She denies pain. Hygiene is fair. She is eating well at over 80% of her meals.    Plan is to continue to monitor patient status q 15 mins, assess response to medications, and maintain the patients safety.    Jane Melendez RN MSN
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
## 2021-03-16 NOTE — PROGRESS NOTES
[facility], [place]   Psychiatric Progress Note      Video-Visit Details    Type of service:  Video Visit    Video Start Time (time video started): 1257    Video End Time (time video stopped): 1258    Originating Location (pt. Location): psychiatric inpatient    Distant Location (provider location): Provider remote location    Mode of Communication:  Video Conference via [employer]    Physician has received verbal consent for a Video Visit from the patient? Yes    Interim History:  The patient's care was discussed with the treatment team during the daily team meeting and/or staff's chart notes were reviewed.  Overnight staff report Patient has been calm and cooperative this shift. She was present in the milieu after dinner but briefly. Otherwise patient prefers to be in her room. She denies all mental health symptoms. Patient received nicotine gum about thrice this shift and took her night time medication.  Slept 5.5 hours    No complaints, continues to wait for treatment.  Goes to groups.     Physical ROS:  The patient endorsed no side effects from medications except as above.. The remainder of 10-point review of systems was negative except as noted in Interim History.    Patient Active Problem List   Diagnosis     Polysubstance abuse (H)     Tobacco abuse     Generalized anxiety disorder     Bipolar depression (H)     Migraines     Dysmenorrhea     CARDIOVASCULAR SCREENING; LDL GOAL LESS THAN 160     Insomnia     Paranoia (psychosis) (H)     MENTAL HEALTH     Carley (H)     Agitation     Aggression       Medications:    benztropine  1 mg Oral BID     fluticasone  1 puff Inhalation Daily     lamoTRIgine  100 mg Oral Daily     levonorgestrel-ethinyl estradiol  1 tablet Oral Daily     metFORMIN  500 mg Oral BID w/meals     OLANZapine zydis  15 mg Oral BID    Or     OLANZapine  10 mg Intramuscular BID     propranolol  20 mg Oral TID     topiramate  50 mg Oral Q12H YELENA       Allergies:  Allergies   Allergen Reactions     Nkda [No Known Drug Allergies]       Labs:  No results found for this or any previous visit (from the past 24 hour(s)).    Psychiatric Examination:    /75   Pulse 80   Temp 98.5  F (36.9  C) (Oral)   Resp 16   Wt 71.3 kg (157 lb 1.6 oz)   SpO2 96%   BMI 24.61 kg/m    Weight is 157 lbs 1.6 oz  Body mass index is 24.61 kg/m .  Orthostatic Vitals       Most Recent      Sitting Orthostatic BP 99/52 03/01 0839    Sitting Orthostatic Pulse (bpm) 88 03/01 0839    Standing Orthostatic /60 03/01 0839    Standing Orthostatic Pulse (bpm) 90 03/01 0839            Appearance: dressed in scrubs  General behavior: Cooperative  Eye Contact: Fair   Gait and Motor Coordination: Normal gait and motor coordination  Speech: WNL  Language: Normal  Attention/Concentration: Fair  Orientation: oriented to time, place and person  Thought Process:  linear  Thought Content:  no evidence of suicidal ideation or homicidal ideation and no evidence of psychotic thought  Recent and Remote Memory:  impaired  Associations: no loose associations  Mood: euthymic  Affect: restricted, subdued  Fund of knowledge: appropriate to education and experiences  Demonstration of insight/judgment: poor  Suicidal risk: Low      Clinical Global Impressions  First:  Considering your total clinical experience with this particular patient population, how severe are the patient's symptoms at this time?: 6 (01/21/21 1437)  Compared to the patient's condition at the START of treatment, this patient's condition is: 3 (01/21/21 1437)  Most recent:  Considering your total clinical experience with this particular patient population, how severe are the patient's symptoms at this time?: 2 (02/16/21 1511)  Compared to the patient's condition at the START of treatment, this patient's condition is: 2 (02/16/21 1511)    Precautions:  Behavioral Orders   Procedures     Assault precautions     Code 1 - Restrict to Unit      Code 1 - Restrict to Unit     Elopement precautions     Fall precautions     Routine Programming     As clinically indicated     Status 15     Every 15 minutes.       Diagnoses:  Bipolar 1 Disorder, most recent episode manic  Amphetamine Use Disorder  Opioid Use Disorder    Assessment/Plan:    24 yo female with bipolar disorder and severe substance use problem.  Stabilized on Lamictal and Zyprexa. On Topamax to offset weight gain.  Poor insight.     1. Continue hospitalization for safety and stabilization.  2. Continue Zyprexa and Lamictal  3. Awaiting placement to CARE  4. Consideration was given to IRTS, however is not likely to succeed given severity of substance use and poor insight.    Reason for ongoing admission: is unable to care for self due to severe psychosis or jackie  Discharge location: Chemical dependency treatment facility  Discharge Medications: not ordered  Follow-up Appointments: not scheduled  Legal Status: full commitment and Carrillo (Zyprexa, Haldol, Risperidone, Abilify)    Entered by: Prabhjot Proctor MD on 03/16/2021 at 12:58 PM

## 2021-03-17 PROCEDURE — 250N000013 HC RX MED GY IP 250 OP 250 PS 637: Performed by: PSYCHIATRY & NEUROLOGY

## 2021-03-17 PROCEDURE — 124N000002 HC R&B MH UMMC

## 2021-03-17 PROCEDURE — 99232 SBSQ HOSP IP/OBS MODERATE 35: CPT | Mod: 95 | Performed by: PSYCHIATRY & NEUROLOGY

## 2021-03-17 PROCEDURE — 250N000013 HC RX MED GY IP 250 OP 250 PS 637: Performed by: STUDENT IN AN ORGANIZED HEALTH CARE EDUCATION/TRAINING PROGRAM

## 2021-03-17 RX ADMIN — NICOTINE POLACRILEX 4 MG: 2 GUM, CHEWING ORAL at 16:53

## 2021-03-17 RX ADMIN — OLANZAPINE 15 MG: 10 TABLET, ORALLY DISINTEGRATING ORAL at 19:05

## 2021-03-17 RX ADMIN — NICOTINE POLACRILEX 4 MG: 2 GUM, CHEWING ORAL at 10:35

## 2021-03-17 RX ADMIN — NICOTINE POLACRILEX 4 MG: 2 GUM, CHEWING ORAL at 12:02

## 2021-03-17 RX ADMIN — BENZTROPINE MESYLATE 1 MG: 1 TABLET ORAL at 19:05

## 2021-03-17 RX ADMIN — LEVONORGESTREL AND ETHINYL ESTRADIOL 1 TABLET: KIT at 10:33

## 2021-03-17 RX ADMIN — MELATONIN TAB 3 MG 3 MG: 3 TAB at 00:39

## 2021-03-17 RX ADMIN — OLANZAPINE 15 MG: 10 TABLET, ORALLY DISINTEGRATING ORAL at 10:34

## 2021-03-17 RX ADMIN — NICOTINE POLACRILEX 4 MG: 2 GUM, CHEWING ORAL at 15:36

## 2021-03-17 RX ADMIN — TOPIRAMATE 50 MG: 50 TABLET ORAL at 10:35

## 2021-03-17 RX ADMIN — METFORMIN HYDROCHLORIDE 500 MG: 500 TABLET, FILM COATED ORAL at 10:35

## 2021-03-17 RX ADMIN — TOPIRAMATE 50 MG: 50 TABLET ORAL at 19:05

## 2021-03-17 RX ADMIN — PROPRANOLOL HYDROCHLORIDE 20 MG: 20 TABLET ORAL at 14:25

## 2021-03-17 RX ADMIN — BENZTROPINE MESYLATE 1 MG: 1 TABLET ORAL at 10:34

## 2021-03-17 RX ADMIN — PROPRANOLOL HYDROCHLORIDE 20 MG: 20 TABLET ORAL at 10:34

## 2021-03-17 RX ADMIN — NICOTINE POLACRILEX 4 MG: 2 GUM, CHEWING ORAL at 18:15

## 2021-03-17 RX ADMIN — FLUTICASONE FUROATE 1 PUFF: 100 POWDER RESPIRATORY (INHALATION) at 10:33

## 2021-03-17 RX ADMIN — LAMOTRIGINE 100 MG: 100 TABLET ORAL at 10:34

## 2021-03-17 RX ADMIN — METFORMIN HYDROCHLORIDE 500 MG: 500 TABLET, FILM COATED ORAL at 18:01

## 2021-03-17 RX ADMIN — NICOTINE POLACRILEX 4 MG: 2 GUM, CHEWING ORAL at 13:33

## 2021-03-17 RX ADMIN — PROPRANOLOL HYDROCHLORIDE 20 MG: 20 TABLET ORAL at 19:05

## 2021-03-17 RX ADMIN — NICOTINE POLACRILEX 4 MG: 2 GUM, CHEWING ORAL at 19:13

## 2021-03-17 ASSESSMENT — ACTIVITIES OF DAILY LIVING (ADL)
HYGIENE/GROOMING: INDEPENDENT
ORAL_HYGIENE: INDEPENDENT
LAUNDRY: WITH SUPERVISION
DRESS: INDEPENDENT

## 2021-03-17 NOTE — PLAN OF CARE
"Pt has been active in the milieu this shift. Affect seems more flat than usual today, pt appears a bit depressed. She is more withdrawn to herself. She denies SI/SIB/HI. She endorses some \"kind of high\" anxiety related to her discharge plan. Pt states she is always feeling anxious because she has been here so long and waiting for CARE. She seems to be feeling hopeful about possible change in discharge plans from CARE to MICD tx. We discussed possible coping mechanisms for anxiety such as riding the bike or getting lavender patch or tea. She declines all at this time, though states she will keep in mind for the future. She ate dinner. She takes her medications without issue.   "

## 2021-03-17 NOTE — PLAN OF CARE
Shift Summary  Psych  Pt presents polite and cooperative. Mood is calm and affect is flat. Denies all mental health symptoms. No evidence of psychosis, paranoia or SIB.  Isolative and withdrawn to self. Slept most of this shift between meal.   Prn: none  Medical  Pt alert and oriented x 3. Denies pain.  Vital sings WNL (see flow sheet for details). Slept 7.0 hours last night. Good medication compliance is noted. Seems tolerating medications well. No side effect reported by pt or noted by this writer. Appetite adequate. No problem with bowel and bladder.   Prn: none  Continue to monitor pt's status Q 15 minutes and stabilize the patient's symptoms with the use of medications and therapeutic programming.
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
## 2021-03-17 NOTE — PROGRESS NOTES
Work Completed:  Team met to discuss Alicia's progress.  She is fully compliant with all medications, attends programming, social in [facility] and contracts for safety.  We are abandoning the IRTS track as an alternative to CARE and, instead, pursuing residential MICD programs for her because she is doing so well.  CARE remains a possibility as well if residential MICD doesn't work out.  Discussed this with Alicia this afternoon.  She agrees and is onboard with our plan.  Let her know that the CD Order is in and they will call her once they process it in the next day or so.  Sent an email to [facility] Dennise Valerio about Alicia's history and the need for an MICD residential program out of the [facility] [place] if possible. Alicia's [facility]  agrees with plan.     Discharge plan or goal:   Patient will enter a treatment program either residential MICD under PD or CARE under commitment whichever placement opens up first.                  Barriers to discharge:    Awaiting placement in treatment program

## 2021-03-17 NOTE — PLAN OF CARE
Pt was visible in the milieu off and on through out the shift. She was observed watching TV in the lounge, attending group, and spending time in her room. She was isolative and withdrawn from staff and peers but would socialize when approached. Her affect was blunted/flat and her mood was calm. Pt was cooperative and polite with staff. She denies all mental health concerns including SI, SIB, hallucinations, anxiety and depression. She ate all meals and was medication compliant. The only PRN's she received this shift was Nicotine gum.

## 2021-03-18 PROCEDURE — 124N000002 HC R&B MH UMMC

## 2021-03-18 PROCEDURE — 250N000013 HC RX MED GY IP 250 OP 250 PS 637: Performed by: PSYCHIATRY & NEUROLOGY

## 2021-03-18 PROCEDURE — 99232 SBSQ HOSP IP/OBS MODERATE 35: CPT | Mod: 95 | Performed by: PSYCHIATRY & NEUROLOGY

## 2021-03-18 PROCEDURE — 250N000013 HC RX MED GY IP 250 OP 250 PS 637: Performed by: STUDENT IN AN ORGANIZED HEALTH CARE EDUCATION/TRAINING PROGRAM

## 2021-03-18 RX ADMIN — METFORMIN HYDROCHLORIDE 500 MG: 500 TABLET, FILM COATED ORAL at 18:02

## 2021-03-18 RX ADMIN — TOPIRAMATE 50 MG: 50 TABLET ORAL at 19:05

## 2021-03-18 RX ADMIN — PROPRANOLOL HYDROCHLORIDE 20 MG: 20 TABLET ORAL at 19:05

## 2021-03-18 RX ADMIN — LAMOTRIGINE 100 MG: 100 TABLET ORAL at 08:14

## 2021-03-18 RX ADMIN — NICOTINE POLACRILEX 4 MG: 2 GUM, CHEWING ORAL at 15:15

## 2021-03-18 RX ADMIN — NICOTINE POLACRILEX 4 MG: 2 GUM, CHEWING ORAL at 13:55

## 2021-03-18 RX ADMIN — LEVONORGESTREL AND ETHINYL ESTRADIOL 1 TABLET: KIT at 08:15

## 2021-03-18 RX ADMIN — NICOTINE POLACRILEX 4 MG: 2 GUM, CHEWING ORAL at 18:03

## 2021-03-18 RX ADMIN — BENZTROPINE MESYLATE 1 MG: 1 TABLET ORAL at 08:14

## 2021-03-18 RX ADMIN — METFORMIN HYDROCHLORIDE 500 MG: 500 TABLET, FILM COATED ORAL at 08:14

## 2021-03-18 RX ADMIN — NICOTINE POLACRILEX 4 MG: 2 GUM, CHEWING ORAL at 08:14

## 2021-03-18 RX ADMIN — NICOTINE POLACRILEX 4 MG: 2 GUM, CHEWING ORAL at 16:24

## 2021-03-18 RX ADMIN — OLANZAPINE 15 MG: 10 TABLET, ORALLY DISINTEGRATING ORAL at 08:14

## 2021-03-18 RX ADMIN — PROPRANOLOL HYDROCHLORIDE 20 MG: 20 TABLET ORAL at 08:14

## 2021-03-18 RX ADMIN — NICOTINE POLACRILEX 4 MG: 2 GUM, CHEWING ORAL at 19:08

## 2021-03-18 RX ADMIN — FLUTICASONE FUROATE 1 PUFF: 100 POWDER RESPIRATORY (INHALATION) at 08:16

## 2021-03-18 RX ADMIN — BENZTROPINE MESYLATE 1 MG: 1 TABLET ORAL at 19:05

## 2021-03-18 RX ADMIN — PROPRANOLOL HYDROCHLORIDE 20 MG: 20 TABLET ORAL at 13:55

## 2021-03-18 RX ADMIN — TOPIRAMATE 50 MG: 50 TABLET ORAL at 08:14

## 2021-03-18 RX ADMIN — OLANZAPINE 15 MG: 10 TABLET, ORALLY DISINTEGRATING ORAL at 19:05

## 2021-03-18 ASSESSMENT — ACTIVITIES OF DAILY LIVING (ADL)
ORAL_HYGIENE: INDEPENDENT
DRESS: INDEPENDENT
HYGIENE/GROOMING: INDEPENDENT
LAUNDRY: WITH SUPERVISION

## 2021-03-18 NOTE — PLAN OF CARE
Shift Summary  Psych  No new concern. Pt seems more depressed and sleeping more. Still waiting for placement. Presents polite and cooperative. Mood is calm and affect is flat. Denies all mental health symptoms. No evidence of psychosis, paranoia or SIB.  Isolative and withdrawn to self. Did not participate in any activity.   Prn: nicotine gums   Medical  Pt alert and oriented x 3. Denies pain. Vital sings WNL (see flow sheet for details). Slept 7.0 hours last night. Good medication compliance is noted. Seems tolerating medications well. No side effect reported by pt or noted by this writer. Appetite fair. No problem with bowel and bladder.   Prn: none  Continue to monitor pt's status Q 15 minutes and stabilize the patient's symptoms with the use of medications and therapeutic programming.

## 2021-03-18 NOTE — PROGRESS NOTES
"[facility], [place]   Psychiatric Progress Note      Video-Visit Details    Type of service:  Video Visit    Video Start Time (time video started): 1217    Video End Time (time video stopped):1218    Originating Location (pt. Location): psychiatric inpatient    Distant Location (provider location): Provider remote location    Mode of Communication:  Video Conference via [employer]    Physician has received verbal consent for a Video Visit from the patient? Yes    Interim History:  The patient's care was discussed with the treatment team during the daily team meeting and/or staff's chart notes were reviewed.  Overnight staff report Pt has been active in the milieu this shift. Affect seems more flat than usual today, pt appears a bit depressed. She is more withdrawn to herself. She denies SI/SIB/HI. She endorses some \"kind of high\" anxiety related to her discharge plan. Pt states she is always feeling anxious because she has been here so long and waiting for CARE. She seems to be feeling hopeful about possible [facility] We discussed possible coping mechanisms for anxiety such as riding the bike or getting lavender patch or tea. She declines all at this time, though states she will keep in mind for the future. She ate dinner. She takes her medications without issue. Slept 6.25 hours    Still waiting on the CD eval.  Is looking forward to the new plan in action.    Physical ROS:  The patient endorsed no side effects from medications except as above.. The remainder of 10-point review of systems was negative except as noted in Interim History.    Patient Active Problem List   Diagnosis     Polysubstance abuse (H)     Tobacco abuse     Generalized anxiety disorder     Bipolar depression (H)     Migraines     Dysmenorrhea     CARDIOVASCULAR SCREENING; LDL GOAL LESS THAN 160     Insomnia     Paranoia (psychosis) (H)     MENTAL HEALTH     Carley (H)     Agitation "     Aggression       Medications:    benztropine  1 mg Oral BID     fluticasone  1 puff Inhalation Daily     lamoTRIgine  100 mg Oral Daily     levonorgestrel-ethinyl estradiol  1 tablet Oral Daily     metFORMIN  500 mg Oral BID w/meals     OLANZapine zydis  15 mg Oral BID    Or     OLANZapine  10 mg Intramuscular BID     propranolol  20 mg Oral TID     topiramate  50 mg Oral Q12H YELENA      Allergies:  Allergies   Allergen Reactions     Nkda [No Known Drug Allergies]       Labs:  No results found for this or any previous visit (from the past 24 hour(s)).    Psychiatric Examination:    /74 (BP Location: Left arm)   Pulse 92   Temp 97.8  F (36.6  C) (Oral)   Resp 16   Wt 71.9 kg (158 lb 8 oz)   SpO2 97%   BMI 24.82 kg/m    Weight is 158 lbs 8 oz  Body mass index is 24.82 kg/m .  Orthostatic Vitals       Most Recent      Sitting Orthostatic BP 99/52 03/01 0839    Sitting Orthostatic Pulse (bpm) 88 03/01 0839    Standing Orthostatic /60 03/01 0839    Standing Orthostatic Pulse (bpm) 90 03/01 0839            Appearance: dressed in scrubs  General behavior: Cooperative  Eye Contact: Fair   Gait and Motor Coordination: Normal gait and motor coordination  Speech: WNL  Language: Normal  Attention/Concentration: Fair  Orientation: oriented to time, place and person  Thought Process:  linear  Thought Content:  no evidence of suicidal ideation or homicidal ideation and no evidence of psychotic thought  Recent and Remote Memory:  impaired  Associations: no loose associations  Mood: neutral  Affect: restricted  Fund of knowledge: appropriate to education and experiences  Demonstration of insight/judgment: poor  Suicidal risk: Low      Clinical Global Impressions  First:  Considering your total clinical experience with this particular patient population, how severe are the patient's symptoms at this time?: 6 (01/21/21 9008)  Compared to the patient's condition at the START of treatment, this patient's condition  is: 3 (01/21/21 8322)  Most recent:  Considering your total clinical experience with this particular patient population, how severe are the patient's symptoms at this time?: 2 (02/16/21 1511)  Compared to the patient's condition at the START of treatment, this patient's condition is: 2 (02/16/21 1511)    Precautions:  Behavioral Orders   Procedures     Assault precautions     Code 1 - Restrict to Unit     Code 1 - Restrict to Unit     Elopement precautions     Fall precautions     Routine Programming     As clinically indicated     Status 15     Every 15 minutes.       Diagnoses:  Bipolar 1 Disorder, most recent episode manic  Amphetamine Use Disorder  Opioid Use Disorder    Assessment/Plan:    24 yo female with bipolar disorder and severe substance use problem.  Stabilized on Lamictal and Zyprexa. On Topamax to offset weight gain.  Poor insight.     1. Continue hospitalization for safety and stabilization.  2. Continue Zyprexa and Lamictal  3. Awaiting placement to CARE. Will initiate referral to community treatment.  CD consult placed  4. Anticipate 5 - 7 days.    Reason for ongoing admission: is unable to care for self due to severe psychosis or jackie  Discharge location: Chemical dependency treatment facility  Discharge Medications: not ordered  Follow-up Appointments: not scheduled  Legal Status: full commitment and Carrillo (Zyprexa, Haldol, Risperidone, Abilify)    Entered by: Prabhjot Proctor MD on 03/18/2021 at 12:16 PM

## 2021-03-18 NOTE — PLAN OF CARE
Work Completed:  Had email from Dennise Valerio that the Order for the CD evaluation went to [facility] [phone].  Emailed her today as to when she may be calling our patient.    Discharge plan or goal:   Patient will go either transfer to CARE under commitment  or enter an MICD Treatment program on a PD from hospital.                Barriers to discharge:    Needs placement in treatment as she is under MICD Commitment/Maurice Gonzalez

## 2021-03-18 NOTE — PLAN OF CARE
"Pt has been visible in the milieu and appropriately social with peers. Affect flat, but does brighten upon conversation. She denies SI/SIB/HI. She endorses anxiety related to placement issues, but also expresses hopefulness that \"maybe i'll know more next week\". She denies medication side effects. She has been resting in bed intermittently this shift. Akathisia symptoms seem diminished today. She took her medications without issue.   "
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
## 2021-03-19 PROCEDURE — 124N000002 HC R&B MH UMMC

## 2021-03-19 PROCEDURE — 250N000013 HC RX MED GY IP 250 OP 250 PS 637: Performed by: PSYCHIATRY & NEUROLOGY

## 2021-03-19 PROCEDURE — H0001 ALCOHOL AND/OR DRUG ASSESS: HCPCS

## 2021-03-19 PROCEDURE — 250N000013 HC RX MED GY IP 250 OP 250 PS 637: Performed by: STUDENT IN AN ORGANIZED HEALTH CARE EDUCATION/TRAINING PROGRAM

## 2021-03-19 PROCEDURE — 99231 SBSQ HOSP IP/OBS SF/LOW 25: CPT | Performed by: PSYCHIATRY & NEUROLOGY

## 2021-03-19 RX ADMIN — BENZTROPINE MESYLATE 1 MG: 1 TABLET ORAL at 19:03

## 2021-03-19 RX ADMIN — METFORMIN HYDROCHLORIDE 500 MG: 500 TABLET, FILM COATED ORAL at 09:16

## 2021-03-19 RX ADMIN — NICOTINE POLACRILEX 4 MG: 2 GUM, CHEWING ORAL at 09:22

## 2021-03-19 RX ADMIN — PROPRANOLOL HYDROCHLORIDE 20 MG: 20 TABLET ORAL at 09:16

## 2021-03-19 RX ADMIN — LEVONORGESTREL AND ETHINYL ESTRADIOL 1 TABLET: KIT at 09:17

## 2021-03-19 RX ADMIN — NICOTINE POLACRILEX 4 MG: 2 GUM, CHEWING ORAL at 20:35

## 2021-03-19 RX ADMIN — METFORMIN HYDROCHLORIDE 500 MG: 500 TABLET, FILM COATED ORAL at 18:00

## 2021-03-19 RX ADMIN — NICOTINE POLACRILEX 4 MG: 2 GUM, CHEWING ORAL at 16:30

## 2021-03-19 RX ADMIN — NICOTINE POLACRILEX 4 MG: 2 GUM, CHEWING ORAL at 18:00

## 2021-03-19 RX ADMIN — OLANZAPINE 15 MG: 10 TABLET, ORALLY DISINTEGRATING ORAL at 09:16

## 2021-03-19 RX ADMIN — MELATONIN TAB 3 MG 3 MG: 3 TAB at 20:35

## 2021-03-19 RX ADMIN — OLANZAPINE 15 MG: 10 TABLET, ORALLY DISINTEGRATING ORAL at 19:03

## 2021-03-19 RX ADMIN — NICOTINE POLACRILEX 4 MG: 2 GUM, CHEWING ORAL at 19:05

## 2021-03-19 RX ADMIN — LAMOTRIGINE 100 MG: 100 TABLET ORAL at 09:16

## 2021-03-19 RX ADMIN — PROPRANOLOL HYDROCHLORIDE 20 MG: 20 TABLET ORAL at 19:03

## 2021-03-19 RX ADMIN — NICOTINE POLACRILEX 4 MG: 2 GUM, CHEWING ORAL at 12:56

## 2021-03-19 RX ADMIN — BENZTROPINE MESYLATE 1 MG: 1 TABLET ORAL at 09:16

## 2021-03-19 RX ADMIN — TOPIRAMATE 50 MG: 50 TABLET ORAL at 09:16

## 2021-03-19 RX ADMIN — NICOTINE POLACRILEX 4 MG: 2 GUM, CHEWING ORAL at 11:57

## 2021-03-19 RX ADMIN — FLUTICASONE FUROATE 1 PUFF: 100 POWDER RESPIRATORY (INHALATION) at 09:17

## 2021-03-19 RX ADMIN — TOPIRAMATE 50 MG: 50 TABLET ORAL at 19:03

## 2021-03-19 ASSESSMENT — ACTIVITIES OF DAILY LIVING (ADL)
DRESS: INDEPENDENT
HYGIENE/GROOMING: INDEPENDENT
ORAL_HYGIENE: INDEPENDENT
LAUNDRY: WITH SUPERVISION

## 2021-03-19 NOTE — PLAN OF CARE
Work Completed:  ERROL completed the CD Assessment with Alicia. Referred her to [facility] [place], [facility] in [place] and [facility] in [place].  All three are residential MID programs and will take her on a PD from the hospital.  Spoke with all three Intake staff and faxed out the CD Assessment, Labs and MAR for review.  Updated Alicia that her referrals are now sent out to these programs.    Patient's mother called wanting to see if we could refer patient to [facility]. Called Intake up there and they will NOT take patient because they do not accept people under commitment who are being provisionally discharged and do not think they could meet her needs there.  They plan to call mother back to let her know that.      Had call from [facility] as [facility] [place] and need the H&P, 72 Hours of progress Notes to review. Faxed this information up to them.      Discharge plan or goal:   The patient will enter an MICD treatment program at discharge.                Barriers to discharge:    Needs placement

## 2021-03-19 NOTE — PROGRESS NOTES
PROGRESS NOTE    The patient was seen for Dr. Proctor, her chart reviewed, her case discussed with staff.  I saw her about two weeks ago and now see no much change in her demeanor and her overall mental status.    This is a 25 year old single white female with a history of bipolar disorder and polysubstance dependency who was initially admitted two months ago on 01/18/2021 because of aggressive behavior and paranoia in the context of polysubstance abuse and specifically amphetamine abuse.  Here she was maintained on Lamictal which continues to be in subtherapeutic dose of 100 mg at bedtime, small dose of Topamax 50 mg QPM, Zyprexa 15 mg BID and Cogentin 1 mg BID.    At this point she was referred to 3 separate MI/ residential treatment facilities; [facility] and [facility]. She has been waiting for a bed opening.    Lab  No new results    VS: Pulse - 95, BP - 118/70, Temp. - 98.7, Resp. - 16, SpO2 - 96%    Mental Status Examination  Upon evaluation today the patient was alert and oriented X 3. She was pleasant and cooperative with the interview. She appeared to be somewhat subdued and slightly depressed with rather flat affect. She denied suicidal ideation. No overt psychotic features were noted or elicited. She showed no EPSE and no signs of TD. She had marginal insight into her problems and her judgement is questionable.    Precautions  Code 1  Elopement Precautions  Assault Precautions    DIAGNOSTIC IMPRESSION    Bipolar Disorder, depressed  Polysubstance Dependency  S/P Amphetamine-induced Psychosis.    Plan: I will increase Lamictal to 125 mg QAM. Continue Topamax and Cogentin in the same doses. Considering decrease the dose of Zyprexa. Transfer to residential MI/CD facility upon bed availability    Thanks,    Houston Tristan MD

## 2021-03-19 NOTE — CONSULTS
3/19/2021    CD consult has been completed.    Recommendations:   Pt has been interviewed and chemical dependency evaluation completed. Pt meets criteria for stimulant use disorder and opioid use disorder. Pt reports she would like to attend inpatient chemical dependency treatment. Pt is recommended to:  1. Abstain from all non-prescribed mood-altering substances  2. Take all medications as prescribed  3. Enter and complete a residential or inpatient treatment program  4. Follow all recommendations upon discharge from treatment. Recommendations may include, but are not limited to: extended treatment, outpatient treatment and/or sober housing.        5.   Follow all recommendations of your medical providers    Referrals have been sent to the following facilities:    [facility]  Phone: [phone]  Efax: [phone]    [facility]   [address] [place] [address]  Phone: [phone]  Fax: [phone]    [facility]  Phone: [phone]   Fax: [phone]      TIMOTHY consult completed by: ERROL Baez  Phone Number: [phone]  E-mail Address: [email or web address] Addiction [email or web address] Evaluation Department  [address] [place] [id number]
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
## 2021-03-20 PROCEDURE — 250N000013 HC RX MED GY IP 250 OP 250 PS 637: Performed by: PSYCHIATRY & NEUROLOGY

## 2021-03-20 PROCEDURE — 124N000002 HC R&B MH UMMC

## 2021-03-20 PROCEDURE — 250N000013 HC RX MED GY IP 250 OP 250 PS 637: Performed by: STUDENT IN AN ORGANIZED HEALTH CARE EDUCATION/TRAINING PROGRAM

## 2021-03-20 RX ADMIN — NICOTINE POLACRILEX 4 MG: 2 GUM, CHEWING ORAL at 19:15

## 2021-03-20 RX ADMIN — METFORMIN HYDROCHLORIDE 500 MG: 500 TABLET, FILM COATED ORAL at 18:12

## 2021-03-20 RX ADMIN — LEVONORGESTREL AND ETHINYL ESTRADIOL 1 TABLET: KIT at 08:03

## 2021-03-20 RX ADMIN — BENZTROPINE MESYLATE 1 MG: 1 TABLET ORAL at 19:15

## 2021-03-20 RX ADMIN — OLANZAPINE 15 MG: 10 TABLET, ORALLY DISINTEGRATING ORAL at 08:03

## 2021-03-20 RX ADMIN — BENZTROPINE MESYLATE 1 MG: 1 TABLET ORAL at 08:03

## 2021-03-20 RX ADMIN — TOPIRAMATE 50 MG: 50 TABLET ORAL at 19:15

## 2021-03-20 RX ADMIN — NICOTINE POLACRILEX 4 MG: 2 GUM, CHEWING ORAL at 11:54

## 2021-03-20 RX ADMIN — FLUTICASONE FUROATE 1 PUFF: 100 POWDER RESPIRATORY (INHALATION) at 08:02

## 2021-03-20 RX ADMIN — PROPRANOLOL HYDROCHLORIDE 20 MG: 20 TABLET ORAL at 08:20

## 2021-03-20 RX ADMIN — OLANZAPINE 15 MG: 10 TABLET, ORALLY DISINTEGRATING ORAL at 19:15

## 2021-03-20 RX ADMIN — PROPRANOLOL HYDROCHLORIDE 20 MG: 20 TABLET ORAL at 19:15

## 2021-03-20 RX ADMIN — PROPRANOLOL HYDROCHLORIDE 20 MG: 20 TABLET ORAL at 13:24

## 2021-03-20 RX ADMIN — NICOTINE POLACRILEX 4 MG: 2 GUM, CHEWING ORAL at 18:12

## 2021-03-20 RX ADMIN — NICOTINE POLACRILEX 4 MG: 2 GUM, CHEWING ORAL at 16:18

## 2021-03-20 RX ADMIN — NICOTINE POLACRILEX 4 MG: 2 GUM, CHEWING ORAL at 09:51

## 2021-03-20 RX ADMIN — TOPIRAMATE 50 MG: 50 TABLET ORAL at 08:03

## 2021-03-20 RX ADMIN — LAMOTRIGINE 125 MG: 100 TABLET ORAL at 08:03

## 2021-03-20 RX ADMIN — METFORMIN HYDROCHLORIDE 500 MG: 500 TABLET, FILM COATED ORAL at 08:03

## 2021-03-20 ASSESSMENT — ACTIVITIES OF DAILY LIVING (ADL)
HYGIENE/GROOMING: HANDWASHING;INDEPENDENT
HYGIENE/GROOMING: INDEPENDENT
LAUNDRY: WITH SUPERVISION
DRESS: SCRUBS (BEHAVIORAL HEALTH);INDEPENDENT
ORAL_HYGIENE: INDEPENDENT
DRESS: SCRUBS (BEHAVIORAL HEALTH)
ORAL_HYGIENE: INDEPENDENT

## 2021-03-20 NOTE — PLAN OF CARE
"Pt has been very withdrawn to self this shift. She endorses feeling \"a little\" depressed and endorses anxiety. Pt states she is still hopeful to figure out discharge plan next week and states she knows she will feel better once there is a clearer plan in place. She denies needing PRN medications for anxiety. Pt stated that she felt less groggy this evening and feels her head has been clearer. She denies SI/SIB/HI. She ate dinner and snacks. She was given PRN melatonin for sleep at her request.   "

## 2021-03-20 NOTE — PLAN OF CARE
"Pt is up for bkfst and meds, today. Her affect is blunted, mood is \"pretty good.\" See PCS for CP review.  "
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
## 2021-03-21 PROCEDURE — 250N000013 HC RX MED GY IP 250 OP 250 PS 637: Performed by: PSYCHIATRY & NEUROLOGY

## 2021-03-21 PROCEDURE — 124N000002 HC R&B MH UMMC

## 2021-03-21 PROCEDURE — 250N000013 HC RX MED GY IP 250 OP 250 PS 637: Performed by: STUDENT IN AN ORGANIZED HEALTH CARE EDUCATION/TRAINING PROGRAM

## 2021-03-21 RX ADMIN — LAMOTRIGINE 125 MG: 100 TABLET ORAL at 10:07

## 2021-03-21 RX ADMIN — NICOTINE POLACRILEX 4 MG: 2 GUM, CHEWING ORAL at 13:40

## 2021-03-21 RX ADMIN — MELATONIN TAB 3 MG 3 MG: 3 TAB at 20:00

## 2021-03-21 RX ADMIN — NICOTINE POLACRILEX 4 MG: 2 GUM, CHEWING ORAL at 17:14

## 2021-03-21 RX ADMIN — FLUTICASONE FUROATE 1 PUFF: 100 POWDER RESPIRATORY (INHALATION) at 10:10

## 2021-03-21 RX ADMIN — OLANZAPINE 15 MG: 10 TABLET, ORALLY DISINTEGRATING ORAL at 19:04

## 2021-03-21 RX ADMIN — BENZTROPINE MESYLATE 1 MG: 1 TABLET ORAL at 10:06

## 2021-03-21 RX ADMIN — PROPRANOLOL HYDROCHLORIDE 20 MG: 20 TABLET ORAL at 14:22

## 2021-03-21 RX ADMIN — PROPRANOLOL HYDROCHLORIDE 20 MG: 20 TABLET ORAL at 19:04

## 2021-03-21 RX ADMIN — BENZTROPINE MESYLATE 1 MG: 1 TABLET ORAL at 19:04

## 2021-03-21 RX ADMIN — LAMOTRIGINE 125 MG: 100 TABLET ORAL at 10:08

## 2021-03-21 RX ADMIN — PROPRANOLOL HYDROCHLORIDE 20 MG: 20 TABLET ORAL at 10:06

## 2021-03-21 RX ADMIN — METFORMIN HYDROCHLORIDE 500 MG: 500 TABLET, FILM COATED ORAL at 18:07

## 2021-03-21 RX ADMIN — NICOTINE POLACRILEX 4 MG: 2 GUM, CHEWING ORAL at 18:50

## 2021-03-21 RX ADMIN — LEVONORGESTREL AND ETHINYL ESTRADIOL 1 TABLET: KIT at 10:08

## 2021-03-21 RX ADMIN — OLANZAPINE 15 MG: 10 TABLET, ORALLY DISINTEGRATING ORAL at 10:05

## 2021-03-21 RX ADMIN — NICOTINE POLACRILEX 4 MG: 2 GUM, CHEWING ORAL at 15:13

## 2021-03-21 RX ADMIN — TOPIRAMATE 50 MG: 50 TABLET ORAL at 19:04

## 2021-03-21 RX ADMIN — NICOTINE POLACRILEX 4 MG: 2 GUM, CHEWING ORAL at 16:14

## 2021-03-21 RX ADMIN — METFORMIN HYDROCHLORIDE 500 MG: 500 TABLET, FILM COATED ORAL at 10:06

## 2021-03-21 RX ADMIN — TOPIRAMATE 50 MG: 50 TABLET ORAL at 10:07

## 2021-03-21 RX ADMIN — NICOTINE POLACRILEX 4 MG: 2 GUM, CHEWING ORAL at 11:30

## 2021-03-21 ASSESSMENT — ACTIVITIES OF DAILY LIVING (ADL)
HYGIENE/GROOMING: INDEPENDENT
DRESS: INDEPENDENT
HYGIENE/GROOMING: INDEPENDENT
ORAL_HYGIENE: INDEPENDENT
LAUNDRY: UNABLE TO COMPLETE
LAUNDRY: WITH SUPERVISION
ORAL_HYGIENE: INDEPENDENT
DRESS: INDEPENDENT

## 2021-03-21 NOTE — PLAN OF CARE
Chief Complaint: Bipolar d/o   Target Symptoms/Status: No change    Pt is calm and cooperative. Pt has been seen walking up and down the halls with headphones, in her room, or in the milieu but not interacting with peers. Pt has been withdrawn to self this shift. Pt is blunted/flat but brightens upon conversation. Pt is medication compliant. Pt denies all mental health symptoms (SI/SIB/AVH/HI) and states she is not experiencing depression or anxiety at the moment. Vitals are WDL. Pt still observed with akathisia/restless feet. Appetite is adequate, ate dinner and snacks.     Plan of Care: Continue 15 minute status checks. Pt is waiting for placement.
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
## 2021-03-21 NOTE — PLAN OF CARE
"Pt has been withdrawn to herself, though visible in the milieu at times. She is less social with peers than usual. Affect is flat. She has been mostly resting in bed. She states \"i'm kind of sad today\" related to being here for so long. Writer provided emotional support and reassured her that it's okay for her to be sad. She states she is still hopeful for tomorrow/this week to get some news about discharge plan. She denies SI/SIB/HI/AVH. Pt was a bit irritable tonight related to metformin stating \"I don't know why I am even taking this\". Writer educated pt about taking metformin and topamax to try to combat weight gain from zyprexa. Pt verbalized understanding. She says she feels they might be helping with the weight gain. She took her medications without issue and went to bed early.   "

## 2021-03-21 NOTE — PLAN OF CARE
Pt isolative and withdrawn most of the shift in her room. Pt only comes out for meals. Pt calm and cooperative but presented with blunted affect. Pt reports appetite and sleep as adequate.Patient medication compliant. Pt denies all mental health symptoms. Staff will continue to monitor and support.    Problem: Behavior Regulation Impairment (Disruptive Behavior)  Goal: Improved Impulse and Aggression Control (Disruptive Behavior)  Outcome: No Change     Problem: Suicidal Behavior  Goal: Suicidal Behavior is Absent or Managed  Outcome: No Change

## 2021-03-22 PROCEDURE — 250N000013 HC RX MED GY IP 250 OP 250 PS 637: Performed by: PSYCHIATRY & NEUROLOGY

## 2021-03-22 PROCEDURE — 99232 SBSQ HOSP IP/OBS MODERATE 35: CPT | Mod: 95 | Performed by: PSYCHIATRY & NEUROLOGY

## 2021-03-22 PROCEDURE — 250N000013 HC RX MED GY IP 250 OP 250 PS 637: Performed by: STUDENT IN AN ORGANIZED HEALTH CARE EDUCATION/TRAINING PROGRAM

## 2021-03-22 PROCEDURE — 124N000002 HC R&B MH UMMC

## 2021-03-22 RX ORDER — LAMOTRIGINE 100 MG/1
100 TABLET ORAL DAILY
Status: DISCONTINUED | OUTPATIENT
Start: 2021-03-23 | End: 2021-03-24 | Stop reason: HOSPADM

## 2021-03-22 RX ORDER — ALBUTEROL SULFATE 90 UG/1
1-2 AEROSOL, METERED RESPIRATORY (INHALATION) EVERY 4 HOURS PRN
Qty: 6.7 G | Refills: 0 | Status: SHIPPED | OUTPATIENT
Start: 2021-03-22

## 2021-03-22 RX ORDER — LAMOTRIGINE 100 MG/1
100 TABLET ORAL DAILY
Qty: 30 TABLET | Refills: 0 | Status: SHIPPED | OUTPATIENT
Start: 2021-03-23

## 2021-03-22 RX ORDER — SUMATRIPTAN 50 MG/1
50 TABLET, FILM COATED ORAL 2 TIMES DAILY PRN
Qty: 15 TABLET | Refills: 0 | Status: SHIPPED | OUTPATIENT
Start: 2021-03-22

## 2021-03-22 RX ORDER — TOPIRAMATE 50 MG/1
50 TABLET, FILM COATED ORAL EVERY 12 HOURS
Qty: 60 TABLET | Refills: 0 | Status: SHIPPED | OUTPATIENT
Start: 2021-03-22

## 2021-03-22 RX ORDER — PROPRANOLOL HYDROCHLORIDE 20 MG/1
20 TABLET ORAL 3 TIMES DAILY
Qty: 90 TABLET | Refills: 0 | Status: SHIPPED | OUTPATIENT
Start: 2021-03-22

## 2021-03-22 RX ORDER — BENZTROPINE MESYLATE 1 MG/1
1 TABLET ORAL 2 TIMES DAILY
Qty: 60 TABLET | Refills: 0 | Status: SHIPPED | OUTPATIENT
Start: 2021-03-22

## 2021-03-22 RX ORDER — LEVONORGESTREL/ETHIN.ESTRADIOL 0.1-0.02MG
1 TABLET ORAL DAILY
Qty: 30 TABLET | Refills: 0 | Status: SHIPPED | OUTPATIENT
Start: 2021-03-22

## 2021-03-22 RX ORDER — OLANZAPINE 15 MG/1
15 TABLET, ORALLY DISINTEGRATING ORAL 2 TIMES DAILY
Qty: 60 TABLET | Refills: 0 | Status: SHIPPED | OUTPATIENT
Start: 2021-03-22

## 2021-03-22 RX ORDER — LANOLIN ALCOHOL/MO/W.PET/CERES
3 CREAM (GRAM) TOPICAL
Qty: 15 TABLET | Refills: 0 | Status: SHIPPED | OUTPATIENT
Start: 2021-03-22

## 2021-03-22 RX ADMIN — NICOTINE POLACRILEX 2 MG: 2 GUM, CHEWING ORAL at 15:29

## 2021-03-22 RX ADMIN — NICOTINE POLACRILEX 4 MG: 2 GUM, CHEWING ORAL at 14:13

## 2021-03-22 RX ADMIN — NICOTINE POLACRILEX 4 MG: 2 GUM, CHEWING ORAL at 19:14

## 2021-03-22 RX ADMIN — PROPRANOLOL HYDROCHLORIDE 20 MG: 20 TABLET ORAL at 19:12

## 2021-03-22 RX ADMIN — NICOTINE POLACRILEX 4 MG: 2 GUM, CHEWING ORAL at 10:12

## 2021-03-22 RX ADMIN — LEVONORGESTREL AND ETHINYL ESTRADIOL 1 TABLET: KIT at 08:51

## 2021-03-22 RX ADMIN — OLANZAPINE 15 MG: 10 TABLET, ORALLY DISINTEGRATING ORAL at 19:12

## 2021-03-22 RX ADMIN — TOPIRAMATE 50 MG: 50 TABLET ORAL at 08:53

## 2021-03-22 RX ADMIN — LAMOTRIGINE 125 MG: 100 TABLET ORAL at 08:52

## 2021-03-22 RX ADMIN — METFORMIN HYDROCHLORIDE 500 MG: 500 TABLET, FILM COATED ORAL at 08:53

## 2021-03-22 RX ADMIN — FLUTICASONE FUROATE 1 PUFF: 100 POWDER RESPIRATORY (INHALATION) at 08:51

## 2021-03-22 RX ADMIN — MELATONIN TAB 3 MG 3 MG: 3 TAB at 21:14

## 2021-03-22 RX ADMIN — PROPRANOLOL HYDROCHLORIDE 20 MG: 20 TABLET ORAL at 14:13

## 2021-03-22 RX ADMIN — METFORMIN HYDROCHLORIDE 500 MG: 500 TABLET, FILM COATED ORAL at 17:56

## 2021-03-22 RX ADMIN — TOPIRAMATE 50 MG: 50 TABLET ORAL at 19:12

## 2021-03-22 RX ADMIN — NICOTINE POLACRILEX 4 MG: 2 GUM, CHEWING ORAL at 11:20

## 2021-03-22 RX ADMIN — NICOTINE POLACRILEX 4 MG: 2 GUM, CHEWING ORAL at 06:50

## 2021-03-22 RX ADMIN — BENZTROPINE MESYLATE 1 MG: 1 TABLET ORAL at 08:52

## 2021-03-22 RX ADMIN — NICOTINE POLACRILEX 4 MG: 2 GUM, CHEWING ORAL at 17:26

## 2021-03-22 RX ADMIN — OLANZAPINE 15 MG: 10 TABLET, ORALLY DISINTEGRATING ORAL at 08:51

## 2021-03-22 RX ADMIN — BENZTROPINE MESYLATE 1 MG: 1 TABLET ORAL at 19:12

## 2021-03-22 RX ADMIN — NICOTINE POLACRILEX 4 MG: 2 GUM, CHEWING ORAL at 16:21

## 2021-03-22 ASSESSMENT — ACTIVITIES OF DAILY LIVING (ADL)
LAUNDRY: WITH SUPERVISION
ORAL_HYGIENE: INDEPENDENT
HYGIENE/GROOMING: INDEPENDENT
HYGIENE/GROOMING: INDEPENDENT
DRESS: INDEPENDENT
ORAL_HYGIENE: INDEPENDENT
LAUNDRY: WITH SUPERVISION
DRESS: INDEPENDENT

## 2021-03-22 NOTE — PLAN OF CARE
Work Completed:  Had call from [facility] requesting notes from the weekend and patient's commitment paperwork.  They will return this today.  They are looking at [facility] or [facility] but nothing definite yet.    [facility] called back after reviewing patient records: [facility] will not take her due to past issues; [facility] had issues with her in the past and discharged her; [facility] said no because they only take private insurance; [facility] does not take people who are coming from hospital on a PD;  [facility] ([place], has a population of mostly men so not a good fit for her.  Patient has been to their women-only [facility] in the past and completed the program.  [facility] will take her.  Called and left a message with her [facility]  Leatha Kong to [facility]  [facility] Leatha called back and reported that she had received a call from the patient's mother who is happy about the PD to [facility].      Discharge plan or goal:   The patient will be PDd to [facility] on Wed March 24 at 1pm.                Barriers to discharge:    Needs placement.

## 2021-03-22 NOTE — PLAN OF CARE
Pt states she is feeling very hopeful about going to [facility] on Thursday. Affect appears brighter today. She was withdrawn to herself and isolative to her room for much of the shift. She came out for dinner and snacks. She denies SI/SIB/HI/AVH. She still endorses low levels of anxiety and depression, though states it is much better now that she has a plan in place for discharge. She was given PRN melatonin at bedtime for sleep per her request. Writer attempted to educate her about quitting smoking after not smoking for 2 months while here. She was not too receptive, though seemed to think it could be a good idea.
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
## 2021-03-22 NOTE — PLAN OF CARE
"Nursing Assessment:  Alicia was up ad mani, spent most of the shift in her room resting. Pt was out for meals, then came out of her room after 2pm.     Alicia denied having suicidal ideation or self harm thoughts. Pt reported feeling very hopeful about being informed this afternoon, that she will be discharging on Wednesday 3/24, \"I am so glad to finally get out of the hosptial, I am going to [facility]\"   "
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
## 2021-03-23 PROCEDURE — 99232 SBSQ HOSP IP/OBS MODERATE 35: CPT | Mod: 95 | Performed by: PSYCHIATRY & NEUROLOGY

## 2021-03-23 PROCEDURE — 250N000013 HC RX MED GY IP 250 OP 250 PS 637: Performed by: PSYCHIATRY & NEUROLOGY

## 2021-03-23 PROCEDURE — 124N000002 HC R&B MH UMMC

## 2021-03-23 PROCEDURE — 250N000013 HC RX MED GY IP 250 OP 250 PS 637: Performed by: STUDENT IN AN ORGANIZED HEALTH CARE EDUCATION/TRAINING PROGRAM

## 2021-03-23 RX ADMIN — NICOTINE POLACRILEX 4 MG: 2 GUM, CHEWING ORAL at 12:59

## 2021-03-23 RX ADMIN — NICOTINE POLACRILEX 4 MG: 2 GUM, CHEWING ORAL at 17:30

## 2021-03-23 RX ADMIN — FLUTICASONE FUROATE 1 PUFF: 100 POWDER RESPIRATORY (INHALATION) at 08:29

## 2021-03-23 RX ADMIN — METFORMIN HYDROCHLORIDE 500 MG: 500 TABLET, FILM COATED ORAL at 08:28

## 2021-03-23 RX ADMIN — NICOTINE POLACRILEX 4 MG: 2 GUM, CHEWING ORAL at 20:17

## 2021-03-23 RX ADMIN — TOPIRAMATE 50 MG: 50 TABLET ORAL at 19:15

## 2021-03-23 RX ADMIN — BENZTROPINE MESYLATE 1 MG: 1 TABLET ORAL at 19:15

## 2021-03-23 RX ADMIN — NICOTINE POLACRILEX 4 MG: 2 GUM, CHEWING ORAL at 14:54

## 2021-03-23 RX ADMIN — NICOTINE POLACRILEX 4 MG: 2 GUM, CHEWING ORAL at 16:18

## 2021-03-23 RX ADMIN — LEVONORGESTREL AND ETHINYL ESTRADIOL 1 TABLET: KIT at 08:29

## 2021-03-23 RX ADMIN — PROPRANOLOL HYDROCHLORIDE 20 MG: 20 TABLET ORAL at 13:59

## 2021-03-23 RX ADMIN — NICOTINE POLACRILEX 4 MG: 2 GUM, CHEWING ORAL at 19:17

## 2021-03-23 RX ADMIN — LAMOTRIGINE 100 MG: 100 TABLET ORAL at 08:28

## 2021-03-23 RX ADMIN — MELATONIN TAB 3 MG 3 MG: 3 TAB at 20:17

## 2021-03-23 RX ADMIN — OLANZAPINE 15 MG: 10 TABLET, ORALLY DISINTEGRATING ORAL at 08:28

## 2021-03-23 RX ADMIN — METFORMIN HYDROCHLORIDE 500 MG: 500 TABLET, FILM COATED ORAL at 18:02

## 2021-03-23 RX ADMIN — PROPRANOLOL HYDROCHLORIDE 20 MG: 20 TABLET ORAL at 08:28

## 2021-03-23 RX ADMIN — BENZTROPINE MESYLATE 1 MG: 1 TABLET ORAL at 08:29

## 2021-03-23 RX ADMIN — OLANZAPINE 15 MG: 10 TABLET, ORALLY DISINTEGRATING ORAL at 19:15

## 2021-03-23 RX ADMIN — PROPRANOLOL HYDROCHLORIDE 20 MG: 20 TABLET ORAL at 19:15

## 2021-03-23 RX ADMIN — NICOTINE POLACRILEX 4 MG: 2 GUM, CHEWING ORAL at 08:39

## 2021-03-23 RX ADMIN — TOPIRAMATE 50 MG: 50 TABLET ORAL at 08:28

## 2021-03-23 ASSESSMENT — ACTIVITIES OF DAILY LIVING (ADL)
DRESS: INDEPENDENT
ORAL_HYGIENE: INDEPENDENT
HYGIENE/GROOMING: INDEPENDENT
LAUNDRY: WITH SUPERVISION

## 2021-03-23 NOTE — PROGRESS NOTES
[facility], [place]   Psychiatric Progress Note      Video-Visit Details    Type of service:  Video Visit    Video Start Time (time video started): 1309    Video End Time (time video stopped):1311    Originating Location (pt. Location): psychiatric inpatient    Distant Location (provider location): Provider remote location    Mode of Communication:  Video Conference via [employer]    Physician has received verbal consent for a Video Visit from the patient? Yes    Interim History:  The patient's care was discussed with the treatment team during the daily team meeting and/or staff's chart notes were reviewed.  Overnight staff report patient is compliant with medications, is very excited to leave tomorrow to [facility] tomorrow at 1 pm - has ixpi-jc-kjus service to [facility]. Slept 5.75 hours.    Patient states she has been here since Jan 21st and is ready to go to treatment.  Denies any issues or complaints    Physical ROS:  The patient endorsed no side effects from medications except as above.. The remainder of 10-point review of systems was negative except as noted in Interim History.    Patient Active Problem List   Diagnosis     Polysubstance abuse (H)     Tobacco abuse     Generalized anxiety disorder     Bipolar depression (H)     Migraines     Dysmenorrhea     CARDIOVASCULAR SCREENING; LDL GOAL LESS THAN 160     Insomnia     Paranoia (psychosis) (H)     MENTAL HEALTH     Carley (H)     Agitation     Aggression       Medications:    benztropine  1 mg Oral BID     fluticasone  1 puff Inhalation Daily     lamoTRIgine  100 mg Oral Daily     levonorgestrel-ethinyl estradiol  1 tablet Oral Daily     metFORMIN  500 mg Oral BID w/meals     OLANZapine zydis  15 mg Oral BID    Or     OLANZapine  10 mg Intramuscular BID     propranolol  20 mg Oral TID     topiramate  50 mg Oral Q12H YELENA      Allergies:  Allergies   Allergen Reactions     Nkda [No Known Drug Allergies]       Labs:  No results found for  this or any previous visit (from the past 24 hour(s)).    Psychiatric Examination:    /74 (BP Location: Right arm)   Pulse 85   Temp 97.9  F (36.6  C) (Oral)   Resp 16   Wt 72.1 kg (159 lb)   SpO2 98%   BMI 24.90 kg/m    Weight is 159 lbs 0 oz  Body mass index is 24.9 kg/m .  Orthostatic Vitals       Most Recent      Sitting Orthostatic BP 99/52 03/01 0839    Sitting Orthostatic Pulse (bpm) 88 03/01 0839    Standing Orthostatic /60 03/01 0839    Standing Orthostatic Pulse (bpm) 90 03/01 0839            Appearance: dressed in scrubs  General behavior: Cooperative  Eye Contact: Fair   Gait and Motor Coordination: Normal gait and motor coordination  Speech: WNL  Language: Normal  Attention/Concentration: Fair  Orientation: oriented to time, place and person  Thought Process:  linear  Thought Content:  no evidence of suicidal ideation or homicidal ideation and no evidence of psychotic thought  Recent and Remote Memory:  impaired  Associations: no loose associations  Mood: neutral  Affect: restricted  Fund of knowledge: appropriate to education and experiences  Demonstration of insight/judgment: poor  Suicidal risk: Low      Clinical Global Impressions  First:  Considering your total clinical experience with this particular patient population, how severe are the patient's symptoms at this time?: 6 (01/21/21 1437)  Compared to the patient's condition at the START of treatment, this patient's condition is: 3 (01/21/21 1437)  Most recent:  Considering your total clinical experience with this particular patient population, how severe are the patient's symptoms at this time?: 2 (02/16/21 1511)  Compared to the patient's condition at the START of treatment, this patient's condition is: 2 (02/16/21 1511)    Precautions:  Behavioral Orders   Procedures     Assault precautions     Code 1 - Restrict to Unit     Code 1 - Restrict to Unit     Elopement precautions     Fall precautions     Routine Programming     As  clinically indicated     Status 15     Every 15 minutes.       Diagnoses:  Bipolar 1 Disorder, most recent episode manic  Amphetamine Use Disorder  Opioid Use Disorder    Assessment/Plan:    26 yo female with bipolar disorder and severe substance use problem.  Stabilized on Lamictal and Zyprexa. On Topamax to offset weight gain.  Poor insight.     1. Continue hospitalization for safety and stabilization.  2. Continue Zyprexa and Lamictal  3. [facility] will  take the patient tomorrow    Reason for ongoing admission: is unable to care for self due to severe psychosis or jackie  Discharge location: Chemical dependency treatment facility  Discharge Medications: ordered  Follow-up Appointments: ordered  Legal Status: full commitment and [facility] (Zyprexa, Haldol, Risperidone, Abilify)    Entered by: Prabhjot Proctor MD on 03/23/2021 at 10:23 AM
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
## 2021-03-23 NOTE — PLAN OF CARE
"Pt has been present in the milieu, appropriately social with staff. She admits that she is starting to feel nervous/anxious about going to [facility], but she is still glad to be discharging and hopeful for the experience she will have there. She states she has been there before a long time ago so she does feel a bit better about that. She was offered lavender patch or tea to try to comfort her and she did have some tea. Pt also took a shower and she stated she felt a lot better after that. We discussed that taking a shower seems to be a great coping skill for her and she agreed. She now states she is \"excited\" to go tomorrow and seems much more optimistic stating \"I will just be resilient about it\". She denies SI/SIB/HI/AVH. She took her medications without issue and went to bed early.   "

## 2021-03-23 NOTE — PLAN OF CARE
Work Completed:  Patient has been accepted to enter [facility]idential [facility] for Women in [place].  Patient was there several years ago and successfully completed the program.  Maurice Kong, patient's mother Michi Maurice Keita Atty all onboard with this plan because the [facility] CARE program was unable to give us any type of date certain or timeline to transfer her and she has already been here for 2+ months and does not meet criteria for hospitalization.  [facility] will  the patient at 1pm and will call us when they arrive.  RNs to send her with a 30 day supply of medications.  Mother will contact [facility] once patient is settled to discuss delivering more clothing to here at treatment.  Will call LICSW Karla Infante at [facility] tomorrow to let her know patient has been Provisionally Discharged.    Discharge plan or goal:   Patient will be PD to [facility] MICD Program tomorrow.                Barriers to discharge:    One more day before bed opens at [facility].  Patient is an MICD Commitment with a Carrillo Order throught Maurice Keita.

## 2021-03-23 NOTE — PLAN OF CARE
No new concern. Discharging tomorrow. Excited and nervious. Withdrawn to self and isolative. Denies SI, SIB and hallucination. Appetite fair. Hopeful for future. Continue to monitor.

## 2021-03-24 VITALS
BODY MASS INDEX: 24.9 KG/M2 | OXYGEN SATURATION: 97 % | WEIGHT: 159 LBS | DIASTOLIC BLOOD PRESSURE: 77 MMHG | HEART RATE: 87 BPM | RESPIRATION RATE: 16 BRPM | TEMPERATURE: 97.6 F | SYSTOLIC BLOOD PRESSURE: 125 MMHG

## 2021-03-24 PROCEDURE — 250N000013 HC RX MED GY IP 250 OP 250 PS 637: Performed by: PSYCHIATRY & NEUROLOGY

## 2021-03-24 PROCEDURE — 250N000013 HC RX MED GY IP 250 OP 250 PS 637: Performed by: STUDENT IN AN ORGANIZED HEALTH CARE EDUCATION/TRAINING PROGRAM

## 2021-03-24 PROCEDURE — 99238 HOSP IP/OBS DSCHRG MGMT 30/<: CPT | Mod: 95 | Performed by: PSYCHIATRY & NEUROLOGY

## 2021-03-24 PROCEDURE — H2032 ACTIVITY THERAPY, PER 15 MIN: HCPCS

## 2021-03-24 RX ADMIN — TOPIRAMATE 50 MG: 50 TABLET ORAL at 07:47

## 2021-03-24 RX ADMIN — LEVONORGESTREL AND ETHINYL ESTRADIOL 1 TABLET: KIT at 07:45

## 2021-03-24 RX ADMIN — NICOTINE POLACRILEX 4 MG: 2 GUM, CHEWING ORAL at 07:47

## 2021-03-24 RX ADMIN — FLUTICASONE FUROATE 1 PUFF: 100 POWDER RESPIRATORY (INHALATION) at 07:44

## 2021-03-24 RX ADMIN — NICOTINE POLACRILEX 4 MG: 2 GUM, CHEWING ORAL at 10:22

## 2021-03-24 RX ADMIN — HYDROXYZINE HYDROCHLORIDE 25 MG: 25 TABLET, FILM COATED ORAL at 02:22

## 2021-03-24 RX ADMIN — OLANZAPINE 15 MG: 10 TABLET, ORALLY DISINTEGRATING ORAL at 07:47

## 2021-03-24 RX ADMIN — METFORMIN HYDROCHLORIDE 500 MG: 500 TABLET, FILM COATED ORAL at 07:46

## 2021-03-24 RX ADMIN — PROPRANOLOL HYDROCHLORIDE 20 MG: 20 TABLET ORAL at 08:26

## 2021-03-24 RX ADMIN — LAMOTRIGINE 100 MG: 100 TABLET ORAL at 07:46

## 2021-03-24 RX ADMIN — BENZTROPINE MESYLATE 1 MG: 1 TABLET ORAL at 07:46

## 2021-03-24 RX ADMIN — NICOTINE POLACRILEX 4 MG: 2 GUM, CHEWING ORAL at 11:24

## 2021-03-24 ASSESSMENT — ACTIVITIES OF DAILY LIVING (ADL)
ORAL_HYGIENE: INDEPENDENT
LAUNDRY: WITH SUPERVISION
DRESS: INDEPENDENT
HYGIENE/GROOMING: INDEPENDENT
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
## 2021-03-24 NOTE — PROGRESS NOTES
Pt participated in dance/movement therapy (DMT) with themes of empowerment, self-determination, and choosing a clear direction or life path for oneself.  This was practiced through movement and expressed through a group dance.  Pt was organized and personally expressive.  She was celebrating both her upcoming birthday as well as her discharge later today.         03/24/21 0930   Dance Movement Therapy   Type of Intervention structured groups   Response participates with encouragement   Hours 0.5
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
## 2021-03-24 NOTE — PROGRESS NOTES
Work Completed:  Called [facility] to confirm the that staff are picking her up at 1pm today.  RN to send 30 day supply of medications along with her.  Provided Alicia with copy of the PD she signed.  Called Karla SAPP at [facility] to let her know that patient was PDd from here to [facility]. Sent Maurice Kong a copy of the PD.    Discharge plan or goal:   The patient PDd to [facility] with [facility] staff transporting her door to door.                Barriers to discharge:    Patient PDd today.

## 2021-03-24 NOTE — DISCHARGE INSTRUCTIONS
Behavioral Discharge Planning and Instructions      Summary:  You were admitted on 1/18/2021  due to agitation and aggression in the context of methamphetamine and heroin and likely medication nonadherence. You were treated by Alicia Reveles MD; Dr. Prabhjot Proctor MD, Dr. Willy Yi and Provisionally Discharged on March 24, 2021 from [facility] to [facility] in [place].    You have been Provisionally Discharged to [facility] in [place].  You remain under commitment until August 3, 2021. Please follow all of the directives listed on your PD and also in your Commitment Order and Carrillo Order (take all medications as prescribed). Failure to do so will get your commitment revoked and you will have to return to the hospital.      Principal Diagnosis:   Bipolar 1 Disorder, current episode manic  Amphetamine Use Disorder   Cannabis Use Disorder      Outpatient Mental Health Provider Clinic is:  [facility]:   Addiction Medicine     Leona Dudley MD:   [address] [place] [address]    [phone]       [facility]  Leatha Kong at [phone]      Attend all scheduled appointments with your outpatient providers. Call at least 24 hours in advance if you need to reschedule an appointment to ensure continued access to your outpatient providers.   Major Treatments, Procedures and Findings:  You were provided with: a psychiatric assessment, assessed for medical stability, medication evaluation and/or management, group therapy and milieu management    Symptoms to Report: feeling more aggressive, increased confusion, losing more sleep, mood getting worse or thoughts of  "suicide    Early warning signs can include: increased depression or anxiety sleep disturbances increased thoughts or behaviors of suicide or self-harm  increased unusual thinking, such as paranoia or hearing voices    Safety and Wellness:  Take all medicines as directed.  Make no changes unless your doctor suggests them.      Follow treatment recommendations.  Refrain from alcohol and non-prescribed drugs.  Ask your support system to help you reduce your access to items that could harm yourself or others. Items could include:  Firearms  Medicines (both prescribed and over-the-counter)  Knives and other sharp objects  Ropes and like materials  Car keys If there is a concern for safety, call 911.    Resources:   Crisis Intervention: 738.223.5898 or 260-046-0017 (TTY: 854.558.5650).  Call anytime for help.  [place] on Mental Illness (www.[place].zeyad.org): 563.342.7244 or 037-560-2751.  Suicide Awareness Voices of Education (SAVE) (www.save.org): 279-354-OYZB (0921)  National Suicide Prevention Line (www.mentalhealthmn.org): 007-219-KIAL (0420)  Mental Health Consumer/Survivor Network of [place] (www.mhcsn.net): 780.389.9958 or 584-429-3931  Mental Health Association of [place] (www.mentalhealth.org): 373.954.5761 or 928-990-9488  Self- Management and Recovery Training., SMART-- Toll free: 344.341.4818  www.artaculous.org  [place] Crisis Response 253-416-0323  Text 4 Life: txt \"LIFE\" to 51782 for immediate support and crisis intervention  Crisis text line: Text \"[place]\" to 847508. Free, confidential, 24/7.    [facility]             24-hours, 7 days/week:  ([phone] [phone]             Business hours:  [phone]  Services are provided in multiple settings. The Crisis Team sees clients within numerous settings including [facility] mental health clinics, medical clinics, police and 's departments, jails, schools, nursing homes, " family homes and community settings.     [facility]:  [phone]  [facility] is an outpatient mental health clinic that offers quality medication treatment and mental health therapy. Services are being provided through video conferencing during the COVID-19 pandemic. The clinic is taking new patients at this time.  Outpatient treatment, with the exception of psychotherapy, which is also available at [address] [place] [address].      The treatment team has appreciated the opportunity to work with you,  please take care and make your recovery a daily recovery.   If you have any questions or concerns our unit number is [phone]
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
## 2021-10-02 ENCOUNTER — HEALTH MAINTENANCE LETTER (OUTPATIENT)
Age: 26
End: 2021-10-02

## 2022-01-22 ENCOUNTER — HEALTH MAINTENANCE LETTER (OUTPATIENT)
Age: 27
End: 2022-01-22

## 2022-07-12 ENCOUNTER — HOSPITAL ENCOUNTER (EMERGENCY)
Facility: CLINIC | Age: 27
Discharge: HOME | End: 2022-07-12
Attending: EMERGENCY MEDICINE | Admitting: EMERGENCY MEDICINE
Payer: COMMERCIAL

## 2022-07-12 VITALS
BODY MASS INDEX: 25.76 KG/M2 | HEIGHT: 68 IN | TEMPERATURE: 98.6 F | SYSTOLIC BLOOD PRESSURE: 121 MMHG | DIASTOLIC BLOOD PRESSURE: 58 MMHG | RESPIRATION RATE: 18 BRPM | OXYGEN SATURATION: 100 % | WEIGHT: 170 LBS | HEART RATE: 78 BPM

## 2022-07-12 DIAGNOSIS — R51.9 ACUTE INTRACTABLE HEADACHE, UNSPECIFIED HEADACHE TYPE: ICD-10-CM

## 2022-07-12 PROCEDURE — 99284 EMERGENCY DEPT VISIT MOD MDM: CPT | Mod: 25

## 2022-07-12 PROCEDURE — 96368 THER/DIAG CONCURRENT INF: CPT

## 2022-07-12 PROCEDURE — 250N000009 HC RX 250: Performed by: EMERGENCY MEDICINE

## 2022-07-12 PROCEDURE — 258N000003 HC RX IP 258 OP 636: Performed by: EMERGENCY MEDICINE

## 2022-07-12 PROCEDURE — 250N000011 HC RX IP 250 OP 636: Performed by: EMERGENCY MEDICINE

## 2022-07-12 PROCEDURE — 96365 THER/PROPH/DIAG IV INF INIT: CPT

## 2022-07-12 PROCEDURE — 96375 TX/PRO/DX INJ NEW DRUG ADDON: CPT

## 2022-07-12 RX ORDER — KETOROLAC TROMETHAMINE 15 MG/ML
15 INJECTION, SOLUTION INTRAMUSCULAR; INTRAVENOUS ONCE
Status: COMPLETED | OUTPATIENT
Start: 2022-07-12 | End: 2022-07-12

## 2022-07-12 RX ORDER — DIPHENHYDRAMINE HYDROCHLORIDE 50 MG/ML
25 INJECTION INTRAMUSCULAR; INTRAVENOUS ONCE
Status: COMPLETED | OUTPATIENT
Start: 2022-07-12 | End: 2022-07-12

## 2022-07-12 RX ORDER — LIDOCAINE HYDROCHLORIDE 40 MG/ML
.5-1 INJECTION, SOLUTION RETROBULBAR ONCE
Status: COMPLETED | OUTPATIENT
Start: 2022-07-12 | End: 2022-07-12

## 2022-07-12 RX ORDER — MAGNESIUM SULFATE 1 G/100ML
1 INJECTION INTRAVENOUS ONCE
Status: COMPLETED | OUTPATIENT
Start: 2022-07-12 | End: 2022-07-12

## 2022-07-12 RX ADMIN — KETOROLAC TROMETHAMINE 15 MG: 15 INJECTION, SOLUTION INTRAMUSCULAR; INTRAVENOUS at 21:48

## 2022-07-12 RX ADMIN — DIPHENHYDRAMINE HYDROCHLORIDE 25 MG: 50 INJECTION, SOLUTION INTRAMUSCULAR; INTRAVENOUS at 21:48

## 2022-07-12 RX ADMIN — FOLIC ACID: 5 INJECTION, SOLUTION INTRAMUSCULAR; INTRAVENOUS; SUBCUTANEOUS at 22:21

## 2022-07-12 RX ADMIN — PROCHLORPERAZINE EDISYLATE 10 MG: 5 INJECTION INTRAMUSCULAR; INTRAVENOUS at 21:48

## 2022-07-12 RX ADMIN — MAGNESIUM SULFATE HEPTAHYDRATE 1 G: 1 INJECTION, SOLUTION INTRAVENOUS at 21:48

## 2022-07-12 RX ADMIN — LIDOCAINE HYDROCHLORIDE 1 ML: 40 INJECTION, SOLUTION RETROBULBAR; TOPICAL at 21:17

## 2022-07-12 ASSESSMENT — ENCOUNTER SYMPTOMS
DYSURIA: 0
DIZZINESS: 0
DIFFICULTY URINATING: 0
UNEXPECTED WEIGHT CHANGE: 0
LIGHT-HEADEDNESS: 0
PHOTOPHOBIA: 1
VOMITING: 1
CHILLS: 0
FEVER: 0
DIARRHEA: 0
NAUSEA: 1
HEADACHES: 1
CONSTIPATION: 1
WEAKNESS: 0
HEMATURIA: 0
SORE THROAT: 0

## 2022-07-13 NOTE — ED TRIAGE NOTES
Here for concern of headache started this morning associated with n/v. Tried Excedrin but unable to keep it down. ABCs intact.     Triage Assessment     Row Name 07/12/22 2004       Triage Assessment (Adult)    Airway WDL WDL       Respiratory WDL    Respiratory WDL WDL       Cardiac WDL    Cardiac WDL WDL

## 2022-07-13 NOTE — ED NOTES
Pt found to be asleep in bed. Pt's name called 3x to awake. Pt had to be awoken with stimulation. Once pt finally responded to writer, pt states her HA is better. Pt was able to ambulate to bathroom indep without issue. MD updated.

## 2022-07-13 NOTE — ED NOTES
"Pt requesting \"an IV with potassium\" because that is what she \"get at 212\". Told pt we regularly do not given special fluids for headaches. Pt states IN lidocaine did not help. Pt restless and unable to sit still in bed. MD updated.   "

## 2022-07-13 NOTE — ED PROVIDER NOTES
History   Chief Complaint:  Headache       The history is provided by the patient.      Alicia Rangel is a 27 year old female with history of migraine who presents with a gradual onset headache, ongoing since this morning, today worse than usual. She denies any trauma and recent falls. She localizes her pain to her right temporal area. She states that she feels warm throughout her body and her temperature was at 98 degrees when she last checked it. She states that she has been vomiting yellow substance and states that her vomiting is more severe than usual. She notes that she is unable to keep food down. She has tried Excedrin for pain management, she notes no relief. She states that her migraine feels better when her eyes are closed. She endorses nausea, photophobia, and constipation. She denies recent illness, fever, chills, weight loss, sore throat, vision disturbance, difficulty urinating, diarrhea, dysuria, hematuria, dizziness, lightheadedness, and weakness. She also denies recent medication changes.     Review of Systems   Constitutional: Negative for chills, fever and unexpected weight change.   HENT: Negative for sore throat.    Eyes: Positive for photophobia. Negative for visual disturbance.   Gastrointestinal: Positive for constipation, nausea and vomiting. Negative for diarrhea.   Genitourinary: Negative for difficulty urinating, dysuria and hematuria.   Neurological: Positive for headaches. Negative for dizziness, weakness and light-headedness.   All other systems reviewed and are negative.    Allergies:  Diatrizoate meglumine  NSAIDs    Medications:  Cogentin   Lamictal  Aviane   Melatonin   Metformin   Zyprexa  Inderal  Imitrex  Topamax  Rexulti  Atarax  Alesse-28  Xopenex    Past Medical History:     Polysubstance abuse  Tobacco abuse  Generalized anxiety disorder  Bipolar depression   Migraines   Dysmenorrhea  Insomnia   Paranoia   Carley  Agitation   Aggression     Past Surgical History:    GYN  "surgery     Family History:    Eye disorder  Alcohol/drug  Hypertension     Social History:  The patient presents to the ED by herself.   Arrived by private vehicle.     Physical Exam     Patient Vitals for the past 24 hrs:   BP Temp Temp src Pulse Resp SpO2 Height Weight   07/12/22 2315 -- -- -- -- -- 100 % -- --   07/12/22 2300 121/58 -- -- 78 -- 100 % -- --   07/12/22 2245 116/61 -- -- 79 -- 100 % -- --   07/12/22 2230 122/59 -- -- 79 -- 100 % -- --   07/12/22 2215 126/83 -- -- 76 -- 100 % -- --   07/12/22 2200 124/74 -- -- 71 -- 100 % -- --   07/12/22 2150 129/75 -- -- 62 -- 96 % -- --   07/12/22 2130 -- -- -- 60 -- 95 % -- --   07/12/22 2125 -- -- -- -- -- 100 % -- --   07/12/22 2120 120/77 -- -- 87 -- 100 % -- --   07/12/22 2005 129/78 98.6  F (37  C) Temporal 84 18 100 % 1.717 m (5' 7.6\") 77.1 kg (170 lb)       Physical Exam  General: Well-nourished, appears to be uncomfortable when I enter the room  Eyes: PERRL, conjunctivae pink no scleral icterus or conjunctival injection.  Mild photophobia  ENT:  Moist mucus membranes, posterior oropharynx clear without erythema or exudates  Respiratory:  Lungs clear to auscultation bilaterally, no crackles/rubs/wheezes.  Good air movement  CV: Normal rate and rhythm, no murmurs/rubs/gallops  GI:  Abdomen soft and non-distended.  Normoactive BS.  No tenderness, guarding or rebound  Skin: Warm, dry.  No rashes or petechiae  Musculoskeletal: No peripheral edema or calf tenderness  Neuro: Alert and oriented to person/place/time.  Neck supple.  PERRL, EOMI no nystagmus, no aphasia/facial droop/dysarthria, tongue midline, symmetric palatal elevation, normal strength at SCM/trapezius/BUE/BLE, normal coordination to FNF at BUE, normal casual gait, negative romberg, sensation intact to LT over face/BUE/BLE  Psychiatric: Normal affect    Emergency Department Course     Emergency Department Course:       Reviewed:  I reviewed nursing notes, vitals, past medical history and Care " Everywhere    Assessments:  2041 I obtained history and examined the patient as noted above.   2100 I rechecked the patient and explained findings.   At this point I feel that the patient is safe for discharge, and the patient agrees.     Interventions:  2117 Lidocaine 4% 0.5-1 mL Intranasal   2148 Compazine 10 mg IV  2148 Toradol 15 mg IV  2148 Benadryl 25 mg IV  2148 Magnesium sulfate 1 g IV    Disposition:  The patient was discharged to home.     Impression & Plan     Medical Decision Making:  Alicia Rangel is a 27 year old female presented with a headache consistent with previous headaches.  Evaluation in the emergency department has been negative. The patient has not had any fever or neck stiffness so I doubt meningitis.  The headache was gradual in onset and like previous headaches so I doubt SAH.  There is no associated numbness, paresthesia or confusion and I doubt stroke or CNS tumor. I do not feel that advanced imaging is indicated at this time. The patient was treated symptomatically and pain has improved with medication interventions.  The patient should follow-up with the primary physician within 3 days. If the headache continues or the frequency increases, consultation with neurology will be indicated.  Return if increasing pain, fever, vomiting or weakness.      Diagnosis:    ICD-10-CM    1. Acute intractable headache, unspecified headache type  R51.9        Discharge Medications:  New Prescriptions    No medications on file       Scribe Disclosure:  I, Deniz Morataya, am serving as a scribe at 8:41 PM on 7/12/2022 to document services personally performed by Yoselin Dubose MD based on my observations and the provider's statements to me.        Yoselin Dubose MD  07/13/22 0029

## 2022-09-03 ENCOUNTER — HEALTH MAINTENANCE LETTER (OUTPATIENT)
Age: 27
End: 2022-09-03

## 2022-09-30 NOTE — PLAN OF CARE
"            Work Completed:  Met with patient today who is very sad about having to sit in the hospital and wait for CARE knowing that they will not even give this writer a timeline as to when we may expect to hear from them.  \"It's not fair!  Last time I went to an IRT[facility].\"  Discussed the court order with her to remind her that she needs a locked MICD Program at [facility] since that is the only locked facility.  She did understand this and we had a further conversation about the the lack of funding being put into MICD programs.  Let her know that all of our staff feel badly about the wait as well and praised her for being so patient and compliant with care. Patient's mother Michi called. Discussed patient progress.  Mom said that patient seems much better when they converse by phone.  Mom feels badly as well about the long wait for CARE.  Reviewed the psychiatric medications patient is taking.  Mom would like to speak to the psychiatrist next week after he has a chance to get to know patient a little better. Will pass this request on to doctor.    Discharge plan or goal:   The patient will be transferred to a CARE Program when a bed opens up.                Barriers to discharge:    Patient will need CARE placement.    " rehabilitation therapy

## 2023-04-29 ENCOUNTER — HEALTH MAINTENANCE LETTER (OUTPATIENT)
Age: 28
End: 2023-04-29

## 2023-09-18 ENCOUNTER — TELEPHONE (OUTPATIENT)
Dept: BEHAVIORAL HEALTH | Facility: CLINIC | Age: 28
End: 2023-09-18
Payer: COMMERCIAL

## 2023-09-18 ENCOUNTER — TELEPHONE (OUTPATIENT)
Dept: BEHAVIORAL HEALTH | Facility: CLINIC | Age: 28
End: 2023-09-18

## 2023-09-18 NOTE — TELEPHONE ENCOUNTER
received a call from Elizabeth with [facility] asking if we received a referral faxed over yesterday 9/17/23 for patient.  let Elizabeth know the assessment was received and being reviewed by the LP Admissions Counselor. Writer cedric Johnson know the LP Admission counselor will contact her once assessment is reviewed.     Elizabeth([facility]) [phone]

## 2023-09-18 NOTE — TELEPHONE ENCOUNTER
----- Message from ERROL Nielson sent at 9/18/2023 11:05 AM CDT -----  Regarding: Referral  Hello, could you please create a referral for this pt to [facility]? Her CD assessment was done by [facility] and I'm faxing it to you momentarily. Thank you!

## 2023-09-19 ENCOUNTER — TELEPHONE (OUTPATIENT)
Dept: BEHAVIORAL HEALTH | Facility: CLINIC | Age: 28
End: 2023-09-19
Payer: COMMERCIAL

## 2023-09-20 ENCOUNTER — HOSPITAL ENCOUNTER (OUTPATIENT)
Dept: BEHAVIORAL HEALTH | Facility: CLINIC | Age: 28
Discharge: HOME | End: 2023-09-20
Attending: FAMILY MEDICINE
Payer: COMMERCIAL

## 2023-09-20 PROBLEM — F19.20 CHEMICAL DEPENDENCY (H): Status: ACTIVE | Noted: 2023-09-20

## 2023-09-20 PROCEDURE — 1002N00001 HC LODGING PLUS FACILITY CHARGE ADULT

## 2023-09-20 RX ORDER — AMOXICILLIN 250 MG
2 CAPSULE ORAL DAILY PRN
COMMUNITY

## 2023-09-20 RX ORDER — MAGNESIUM HYDROXIDE/ALUMINUM HYDROXICE/SIMETHICONE 120; 1200; 1200 MG/30ML; MG/30ML; MG/30ML
30 SUSPENSION ORAL EVERY 6 HOURS PRN
COMMUNITY

## 2023-09-20 RX ORDER — GUAIFENESIN/DEXTROMETHORPHAN 100-10MG/5
10 SYRUP ORAL EVERY 4 HOURS PRN
COMMUNITY

## 2023-09-20 RX ORDER — IBUPROFEN 200 MG
400 TABLET ORAL EVERY 6 HOURS PRN
COMMUNITY

## 2023-09-20 RX ORDER — ACETAMINOPHEN 325 MG/1
325-650 TABLET ORAL EVERY 4 HOURS PRN
COMMUNITY

## 2023-09-20 RX ORDER — LORATADINE 10 MG/1
10 TABLET ORAL DAILY PRN
COMMUNITY

## 2023-09-20 ASSESSMENT — ANXIETY QUESTIONNAIRES
3. WORRYING TOO MUCH ABOUT DIFFERENT THINGS: SEVERAL DAYS
4. TROUBLE RELAXING: MORE THAN HALF THE DAYS
7. FEELING AFRAID AS IF SOMETHING AWFUL MIGHT HAPPEN: MORE THAN HALF THE DAYS
IF YOU CHECKED OFF ANY PROBLEMS ON THIS QUESTIONNAIRE, HOW DIFFICULT HAVE THESE PROBLEMS MADE IT FOR YOU TO DO YOUR WORK, TAKE CARE OF THINGS AT HOME, OR GET ALONG WITH OTHER PEOPLE: SOMEWHAT DIFFICULT
2. NOT BEING ABLE TO STOP OR CONTROL WORRYING: MORE THAN HALF THE DAYS
6. BECOMING EASILY ANNOYED OR IRRITABLE: MORE THAN HALF THE DAYS
1. FEELING NERVOUS, ANXIOUS, OR ON EDGE: SEVERAL DAYS
5. BEING SO RESTLESS THAT IT IS HARD TO SIT STILL: NOT AT ALL
GAD7 TOTAL SCORE: 10
GAD7 TOTAL SCORE: 10

## 2023-09-20 ASSESSMENT — PATIENT HEALTH QUESTIONNAIRE - PHQ9: SUM OF ALL RESPONSES TO PHQ QUESTIONS 1-9: 10

## 2023-09-20 NOTE — PROGRESS NOTES
Name: Alicia Rangel  Date: 9/20/2023  Medical Record: [id number]    Envelope Number: [id number]  List of Contents (List each item separately in new row):   Propanolol 20 mg/ Lamotrigine/ Ondansetron/ Pain relief/ Tylenol caplets/ clear eyes/ Fluticasone  Admission:  I am responsible for any personal items that are not sent to the safe or pharmacy.  [facility] is not responsible for loss, theft or damage of any property in my possession.    Patient Signature:  ___________________________________________       Date/Time:__________________________    Staff Signature: __________________________________       Date/Time:__________________________    [facility] Staff person, if patient is unable/unwilling to sign:      __________________________________________________________       Date/Time: __________________________    **All medications are packaged by  staff and securely stored on [facility] plus. Medications left by patients at discharge will be packaged by LP staff and transported by LP staff to inpatient pharmacy for storage.**  Discharge:  [facility] has returned all of my personal belongings:    Patient Signature: ________________________________________     Date/Time: ____________________________________    Staff Signature: ______________________________________     Date/Time:_____________________________________
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
## 2023-09-20 NOTE — PROGRESS NOTES
Progress Note    This patient had a Full Comprehensive Substance Use Disorder assessment on 9/17/23 completed by ERROL Solomon.  This patient was seen for a face to face update of the Substance Use Disorder Update assessment on 9/20/2023 by ERROL Fonseca.  OUTSIDE: A paper copy of the Full Comprehensive Substance Use Disorder assessment will be placed in the patient's paper chart until being scanned into the patient's electronic medical record in Epic under the Media tab.    Alcohol/Drug use since the last CD evaluation (include date of last use):     No additional substances use since the last CD evaluation     Please note any other clinical changes since the last CD evaluation (such as medication changes, additional legal charges, detoxification admissions, overdoses, etc.)     No significant changes since the last CD evaluation       ASAM Dimensions Original scores Current Scores   I.) Intoxication and Withdrawal: 2 0   II.) Biomedical:  0 2   III.) Emotional and Behavioral:  2 2   IV.) Readiness to Change:  3 3   V.) Relapse Potential: 4 4   VI.) Recovery Environmental: 4 4     Please list clinical justifications for the above ASAM score changes since the original comprehensive assessment:     The patient's score on Dimension I changed from a 2 to a 0.  The patient had not consumed any alcohol or drugs since 9/13/23 and she does not appear to have any current risk of having significant withdrawal symptoms.        Current FRANK: Current UA:     0.000     Positive for Methamphetamine and negative for all other screened drugs.       PHQ-9, AGUSTIN-7   PHQ-9 on 9/20/2023 AGUSTIN-7 on 9/20/2023   The patient's PHQ-9 score was 10 out of 27, indicating moderate depression.   The patient's AGUSTIN-7 score was 10 out of 21, indicating moderate anxiety.       [place]-Suicide Severity Rating Scale Reassessment   Have you ever wished you were dead or that you could go to sleep and not wake up?  Past Month:  No      Have you actually had any thoughts of killing yourself?  Past Month:  No     Have you been thinking about how you might do this?     Past Month:  No   Lifetime:  No   Have you had these thoughts and had some intention of acting on them?     Past Month:  No   Lifetime:  No   Have you started to work out the details of how to kill yourself?   Past Month:  No   Lifetime:  No   Do you intend to carry out this plan?   No     When you have the thoughts how long do they last?  The patient denied ever having any suicidal thoughts in life.     Are there things - anyone or anything (i.e. family, Orthodox, pain of death) that stopped you from wanting to die or acting on thoughts of suicide?  Does not apply       2008  [employer]  Used with permission by Corrie Melo, PhD.       Guide to C-SSRS Risk Ratings   NO IDEATION:  with no active thoughts IDEATION: with a wish to die. IDEATION: with active thoughts. Risk Ratings   If Yes No No 0 - Very Low Risk   If NA Yes No 1 - Low Risk   If NA Yes Yes 2 - Low/moderate risk   IDEATION: associated thoughts of methods without intent or plan INTENT: Intent to follow through on suicide PLAN: Plan to follow through on suicide Risk Ratings cont...   If Yes No No 3 - Moderate Risk   If Yes Yes No 4 - High Risk   If Yes Yes Yes 5 - High Risk   The patient's ADDITIONAL RISK FACTORS and lack of PROTECTIVE FACTORS may increase their overall suicide risk ratings.     Additional Risk Factors:   Someone close to the patient (family member/friend) completed a suicide    Significant history of having untreated or poorly treated mental health symptoms    Significant history of trauma and/or abuse issues    History of impulsive or aggressive behaviors   Protective Factors:   Having people in his/her life that would prevent the patient from considering a suicide attempt (i.e. young children, spouse, parents, etc.)    An absence of mental health issues or stable  "and well treated mental health issues    An absence of chronic health problems or stable and well treated chronic health issues    A positive relationship with his/her clinical medical and/or mental health providers    Having easy access to supportive family members    Having a good community support network    Having cultural, Hinduism or spiritual beliefs that discourage suicide    Having restricted access to highly lethal means of suicide     Risk Status   0. - Very Low Risk:  Evaluation Counselors:  Document in Epic / SBAR to counselor \"Very Low Risk\".      Treatment Counselors:  Reassess upon admission as applicable, assess weekly in progress notes under Dimension 3 and summarize in Discharge / Treatment summary under Dimension 3.     Additional information to support suicide risk rating: There was no additional information to provide at this time.       "

## 2023-09-20 NOTE — PROGRESS NOTES
Name: Alicia Rangel  Date: 9/20/2023  Medical Record: [id number]    Envelope Number: [id number]    List of Contents (List each item separately in new row):   One cell phone / One   Admission:  I am responsible for any personal items that are not sent to the safe or pharmacy.  [facility] is not responsible for loss, theft or damage of any property in my possession.    Patient Signature:  ___________________________________________       Date/Time:__________________________    Staff Signature: __________________________________       Date/Time:__________________________    [facility] Staff person, if patient is unable/unwilling to sign:      __________________________________________________________       Date/Time: __________________________    **All medications are packaged by LP staff and securely stored on [facility]. Medications left by patients at discharge will be packaged by LP staff and transported by LP staff to inpatient pharmacy for storage.**  Discharge:  [facility] has returned all of my personal belongings:    Patient Signature: ________________________________________     Date/Time: ____________________________________    Staff Signature: ______________________________________     Date/Time:_____________________________________

## 2023-09-20 NOTE — PROGRESS NOTES
[facility] Assessment      Vital signs:     T98.3, B/P 112/67/ P98, sats 96%RA, RR18      Direct admission    Counselor: Group E  Drug of Choice: Fentanyl and meth  Last use: 9/13/23  Home clinic/MD:   Dr. Myriam Hanna   [facility]  Psychiatrist/therapist: Keon he    Medical history/current conditions:    Migraines       H&P Screen:  H&P within the last 90 days: Yes.  Date: 7/20/23 Location: [facility]      Mental Health diagnosis: Bipolat, Anxiety, PTSD  Medication compliant?: yes   Recent sucidal thoughts? no     When? N/a  Current thought of self-harm? no    Plan? N/a    Pain assessment:   Pt. Experiencing pain at this time?  No      Nursing Assessment Summary:    LP Falls assessment    Has patient had a fall(s) in the last 6 months?  No  If yes, what were the circumstances surrounding the fall(s)? N/a  Does patient have gait dysfunctions? (limping, dragging of toes, shuffling feet, unsteadiness, difficulty standing /walking)  No  Does patient appear to have deconditioning/muscle loss/malnutrition/fatigue? (due to inactivity, bedrest (long hospitalization), medical condition(s) No  Does patient experience confusion, dizziness or vertigo? No  Is patient visually impaired? No   Does patient have any adaptive equipment (hearing aids, wheelchair, walker, prosthesis, crutches, cane, etc..) No  Is patient taking 2 or more of the following medications?  anticonvulsants, anti-hypertensives, diuretics, laxatives, sedatives, and psychotropics (single-select) yes, Lamictal  Is patient taking medication (s) that would cause urinary or bowel urgency (ex: lactulose/Furosemide)? No  Does patient have a medical condition (ex: Diabetes, Liver Disease, Respiratory Diseases, Chronic Pain, Heart Disease, Neuropathy, Seizure like Disorder, etc...) that could affect balance/gait or risk for falls? No    If yes to any of the above educate patient on fall  prevention while at [facility].           Floors clutter/obstacle free           Proper footwear/[place] shoes          Adjust walking speed accordingly          Recommend caution going on longer walks. Try shorter walks and see how you do first.  Use elevators when appropriate.          Notify staff if you are experiencing fatigue, weakness, drowsiness/ dizziness or have had a fall.           Use adaptive equipment as recommended          Wheelchairs must be managed and propelled independently without staff assistance           Expectation of complete independence with all cares and compliance.  Report to staff if having difficulty     LP patient who poses a potential falls risk will be advised of the following:  Please follow the recommendations as listed above or it may affect your treatment plan.  Your treatment team would have to evaluate if this level of care is appropriate for you.    Patient acknowledges and verbalizes understanding of the above criteria? yes  Support staff / counselors notified of pt Fall Screen and plan of prevention. LPRN to re-assess as appropriate. White board and SBAR updated No    LP Community Medical Screen for COVID-19    ______________________________________________________________________________________________________________________  Do you have any of the following NEW or worsening symptoms NOT attributed to pre-existing conditions?    No    Fever of 100.0  F (37.8 C) or over  Chills  Cough  Shortness of Breath  Loss of taste or smell  Generalized body aches  Persistent headache  Sore throat (or trouble eating or drinking in young children?)  Nausea, Vomiting, or diarrhea (loose stools)    Did you test positive for COVID-19 in the last 10 days or are you waiting on the test results due to an exposure or symptoms?  No    Has anyone told you to self-quarantine due to exposure to someone with COVID-19?  No    If pt responds   YES to any of the symptoms or  Positive COVID-19  result in the last 10 days with or without symptoms or YES to symptoms with pending results ptwill need to leave unit immediately and can return in 11 days from discharge date.    ______________________________________________________________________________________________________________________        COVID-19 - Pt informed of the following while at LP:    1) Practice good hand washing hygiene and avoid touching face    2) If pt has any of the symptoms below, notify staff immediately.    Fever   Cough   Shortness of breath or difficulty breathing   Chills   Repeated shaking with chills  Muscle pain     3) COVID-19 testing may be initiated more than once during your stay.  If COVID results are at anytime positive, the pt will follow exit plan as listed above,  quarantine as recommended per CDC and then may return for CD treatment after symptoms have resolved.     RN Assessment of Patient's Ability to Safely Manage and Self-Administer Respiratory Treatments    Has experience in the management of Respiratory (If NA, indicate and move to Integrative Therapies): Yes    Including knowledge and understanding of the importance of:    Does pt have any of the following Respiratory Illness/disorders?   Asthma, COPD, RAD, Bronchitis, Emphysema, Cystic fibrosis, JN Yes    Which Acute or Chronic Respiratory Illness do you have which requires intervention? Asthma?? Patient not sure   Did pt bring all prescribed respiratory supplies? CPAP, BiPap, Nebulizer, Nebulizer solution, scheduled inhaler, Rescue Inhaler  Yes   Pt understands they must carry  Rescue Inhalers  at all times Yes   Alerting staff with respiratory symptoms?  Wheezing, SOB, Tightness or pain in chest, Excessive Daytime Sleepiness Yes     Does the patient have the physical and mental ability to:     Perform respiratory cares? CPAP, BiPap, Nebulizer tx, scheduled inhaler, Rescue Inhaler tx, respiratory medicines Yes   Determine when and how often to use  respiratory treatments? (CPAP, BiPap, Nebulizer tx, scheduled inhaler, Rescue Inhaler tx, respiratory medicines Yes   Nebulizer/CPAP/BiPAP accessories N/a   CPAP/BiPAP Therapy settings N/a     Therefore does the patient, present a risk of harm to themselves or other clients in the facility if allowed to self-administer Respiratory treatments.  Consider factors above.  No    I have assessed the patient to be able to safely administer respiratory treatments.  Yes    Integrative Therapies: Essential Oils    Patient requesting essential oil inhaler to manage (Mood/Mental Health/Physical/Spiritual symptoms).     Discussed appropriate use of essential oil inhalers and instructed patient not to leave labeled product out on unit.     Patient was screened for kidney disease, asthma/reactive airway disease and rashes and wounds or 1st trimester of pregnancy    List Essential Oils requested by pt no    Patient verbalized and demonstrated understanding of how to use essential oil inhaler correctly and will notify LP RN with any concerns or side effects. Patient agrees not to share their essential oil inhaler with other clients.  Continue to support the patient in safely utilizing integrative therapies as able to manage symptoms during treatment.     Patient tobacco use:    Do you use tobacco? yes   Type? cigarettes  How often? Daily  How much Half a pack  Are you interested in quitting? no    NRT (Nicotine Replacement therapy) ordered? no   Pt is aware of the dangers of tobacco cessation and in contemplation.    Pt given written education.    Nutritional Assessment:    Have you ever purged, binged or restricted yourself as a way to control your weight?   No     Are you on a special diet?   No     Do you have any concerns regarding your nutritional status?   No     Have you had any appetite changes in the last 3 months?   No   Have you had weight loss or weight gain of more than 10 lbs in the last 3 months?   If patient gained  or lost more than 10 lbs, then refer to program RN / attending Physician for assessment.   No   Was the patient informed of BMI?       No   Have you engaged in any risk-taking behavior that would put you at risk for exposure to blood-borne or sexually transmitted diseases?   No   Do you have any dental problems?   No       Review with pt's for opioid use disorder: (Please delete below if not applicable)    Pt reporting last use of opioid on date 9/13/23 at time   Did pt's bring comfort medications and/or Suboxone as instructed to manage withdrawal? Yes, prescription sent [place] pharmacy according to the patient  Who is pt's community provider that helps with opioid use disorder? [facility]   LPRN to review with pt:   Pt is expected to attend all required LP programming without staff prompting   Compliancy with all prescribed medication self-administration is required   Pt understands LP drug toxicology screening process  Pt given written copy of detox options and/or access to Recovery Clinic    LPRN reviewed with pt the following information:  Yes  Pt informed if leaving AMA, they will be directed to take medications with them.  Should pt's choose to leave medications at LP, ALL medications will be packaged and delivered to inpatient pharmacy for temporary storage.  Inpt pharmacy will follow protocol to reach out to pt.  If pharmacy unable to reach pt and/or pt does not retrieve medications, they will be destroyed per inpt pharmacy protocol.   In regards to 'medical concerns/medication refill expectations' while at LP:  LP has no rounding/managing provider to assess medical issues or to refill your medications  Please make virtual/phone appt/s with your community provider/s and notify LPRN of date and time  You may not leave LP to attend any medical appt's.   You are responsible for having a plan to refill medications if necessary.  Please allow time to complete this.    Pt verbalizes understanding of the above  criteria yes    [facility]  Nurse Liaison / CD [facility]  O: [phone]  Fax:[phone]  [facility] [id number]  M-F: 7AM to 5:30PM   Sat-Sun: 7AM to 3PM  After hours: [phone]     On-going nursing intervention required?   No    Acute care visit recommended: no

## 2023-09-20 NOTE — PROGRESS NOTES
Initial Services Plan    Before your first treatment group, please do the following    Immediate health & safety concerns: Go to the emergency room if you start to have withdrawal symptoms.  Look for sober housing and a supportive social network.  Look for a support network (such as AA, NA, DBT group, a Rastafari group, etc.)    Suggestions for client during the time between intake & completion of treatment plan:  Tour your treatment center (unit or outpatient clinic).  Introduce yourself to the treatment group.  Spend time getting to know your peers.  Complete your psycho-social paperwork.  Complete the problem list for your treatment plan.  Start drug and alcohol use history.  Review your patient or client handbook.    Client issues to be addressed in the first treatment sessions:  Identify motivations(s) for coming to treatment, i.e. legal, family, job, self  Identify concerns about contacting boss, PO  Identify outside concerns that may interfere with treatment, i.e. bills not getting paid, vernon for children    Rick Polanco, [facility]  9/20/2023

## 2023-09-21 ENCOUNTER — HOSPITAL ENCOUNTER (OUTPATIENT)
Dept: BEHAVIORAL HEALTH | Facility: CLINIC | Age: 28
Discharge: HOME | End: 2023-09-21
Attending: FAMILY MEDICINE
Payer: COMMERCIAL

## 2023-09-21 DIAGNOSIS — F15.90 STIMULANT USE DISORDER: ICD-10-CM

## 2023-09-21 DIAGNOSIS — F11.90 OPIOID USE DISORDER: ICD-10-CM

## 2023-09-21 DIAGNOSIS — Z72.0 TOBACCO ABUSE: Primary | ICD-10-CM

## 2023-09-21 LAB — FENTANYL UR QL: ABNORMAL

## 2023-09-21 PROCEDURE — 80307 DRUG TEST PRSMV CHEM ANLYZR: CPT

## 2023-09-21 PROCEDURE — 80354 DRUG SCREENING FENTANYL: CPT | Mod: 59

## 2023-09-21 PROCEDURE — 1002N00001 HC LODGING PLUS FACILITY CHARGE ADULT

## 2023-09-21 PROCEDURE — H2035 A/D TX PROGRAM, PER HOUR: HCPCS | Mod: HQ

## 2023-09-21 NOTE — GROUP NOTE
Group Therapy Documentation    PATIENT'S NAME: Alicia Rangel  MRN:   [id number]  :   1995  ACCT. NUMBER: [id number]  DATE OF SERVICE: 23  START TIME:  9:00 AM  END TIME: 11:00 AM  FACILITATOR(S): Soha Lira LADC  TOPIC: BEH Group Therapy  Number of patients attending the group:  11    Group Length:  2 Hours    Group Therapy Type: Recovery strategies, Emotion processing, and Daily living/independence skills    Summary of Group / Topics Discussed:    Recovery Principles, Sober coping skills, Relationship/socialization, Balanced lifestyle, and Relapse prevention      Group Attendance:  Attended group session    Patient's response to the group topic/interactions:  cooperative with task    Patient appeared to be Actively participating, Attentive, and Engaged.        Client specific details:  Patient  attended group session and was attentive and participative.

## 2023-09-21 NOTE — GROUP NOTE
"Group Therapy Documentation    PATIENT'S NAME: Alicia Rangel  MRN:   [id number]  :   1995  ACCT. NUMBER: [id number]  DATE OF SERVICE: 23  START TIME: 12:30 PM  END TIME:  2:30 PM  FACILITATOR(S): Myriam Carter LADC  TOPIC: BEH Group Therapy  Number of patients attending the group:  10  Group Length:  2 Hours    Group Therapy Type: Emotion processing    Summary of Group / Topics Discussed:    Sober coping skills, Cognitive behavioral therapy skills, and Emotions/expression    Facilitator held space for patients to process recent events and changes in group dynamics. Several patients shared intense emotions they are experiencing. A new patient introduced herself to her peers. Facilitator led group discussion on emotion regulation. Patients gained knowledge on how to identify emotions they experience, and how to recognize the \"ingredients\" of their emotions, their thoughts, physical sensations, and resulting behaviors. Patients then discussed how emotions influenced their substance use. Each patient had an opportunity to process, and provide constructive feedback to peers who shared.      Group Attendance:  Attended group session    Patient's response to the group topic/interactions:  cooperative with task    Patient appeared to be Actively participating, Attentive, and Engaged.        Client specific details:  Patient fully engaged in group process, providing appropriate feedback, and sharing personal insights. Patient introduced herself to her peers sharing one of her goals in treatment is \"to stop being trapped by my addiction.\" Patient also processed emotions of anxiety and worry about her partner who also is going to treatment.    "

## 2023-09-21 NOTE — GROUP NOTE
Group Therapy Documentation    PATIENT'S NAME: Alicia Rangel  MRN:   [id number]  :   1995  ACCT. NUMBER: [id number]  DATE OF SERVICE: 23  START TIME:  3:00 PM  END TIME:  4:00 PM  FACILITATOR(S): Mercy Lyn LADC; Renetta Farrar LADC  TOPIC: BEH Group Therapy  Number of patients attending the group:  27  Group Length:  1 Hours    Group Therapy Type: Recovery strategies    Summary of Group / Topics Discussed:    Relationship/socialization and Emotions/expression      Group Attendance:  Attended group session    Patient's response to the group topic/interactions:  cooperative with task    Patient appeared to be Attentive and Engaged.        Client specific details: Alicia was an active participant in skills group on peer support and affirmations.

## 2023-09-21 NOTE — PROGRESS NOTES
Name: Alicia Rangel  Date: 9/21/2023  Medical Record: [id number]    Envelope Number: [id number]    List of Contents (List each item separately in new row):   Narcan 4MG   Ondansetron 4MG  Propranolol 20MG   Acetaminophen 500MG   Wixela  Tylenol (2 pills)     Admission:  I am responsible for any personal items that are not sent to the safe or pharmacy.  [facility] is not responsible for loss, theft or damage of any property in my possession.      Patient Signature:  ___________________________________________       Date/Time:__________________________    Staff Signature: __________________________________       Date/Time:__________________________    [facility] Staff person, if patient is unable/unwilling to sign:      __________________________________________________________       Date/Time: __________________________      **All medications are packaged by  staff and securely stored on [facility]. Medications left by patients at discharge will be packaged by LP staff and transported by LP staff to inpatient pharmacy for storage.**    Discharge:  [facility] has returned all of my personal belongings:    Patient Signature: ________________________________________     Date/Time: ____________________________________    Staff Signature: ______________________________________     Date/Time:_____________________________________
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
## 2023-09-22 ENCOUNTER — HOSPITAL ENCOUNTER (OUTPATIENT)
Dept: BEHAVIORAL HEALTH | Facility: CLINIC | Age: 28
Discharge: HOME | End: 2023-09-22
Attending: FAMILY MEDICINE
Payer: COMMERCIAL

## 2023-09-22 PROCEDURE — 1002N00001 HC LODGING PLUS FACILITY CHARGE ADULT

## 2023-09-22 PROCEDURE — H2035 A/D TX PROGRAM, PER HOUR: HCPCS | Mod: HQ

## 2023-09-22 NOTE — GROUP NOTE
"Group Therapy Documentation    PATIENT'S NAME: Alicia Rangel  MRN:   [id number]  :   1995  ACCT. NUMBER: [id number]  DATE OF SERVICE: 23  START TIME:  9:00 AM  END TIME: 11:00 AM  FACILITATOR(S): Keila Stanford LADC  TOPIC: BEH Group Therapy  Number of patients attending the group:  9  Group Length:  2 Hours    Group Therapy Type: Recovery strategies    Summary of Group / Topics Discussed:    Recovery Principles    Patients completed daily check ins and the question of the day, \" What are your blindspots in recovery?\"      Patients engaged in reading and processing daily meditations.    Patients shared their success and pitfalls from the week, and discussed goals and self care for the weekend.     Patients discussed the recent influx of new patients and how to best help them admit and adjust into the group, and build a great report with a the group as a whole.     Patients engaged in a completion ceremony for a peer and presented them with a certificate and completion coin.           Group Attendance:  Attended group session    Patient's response to the group topic/interactions:  cooperative with task    Patient appeared to be Actively participating.        Client specific details:  Alicia  attended AM small group therapy. Patient participated and remained engaged throughout group. Patient shared daily affirmations, goals, and feelings. Patient shared her insights on the meditation readings.     Patient reported feeling, \" indifferent, many feelings at once.\" Patient shared she struggles with intense ADHD symptoms. Patient was offered a stress ball, fidget items, and sketch sheets.       Patient reported her blindspot is \"going through the motions or floating through treatment in the past. \" Patient received support from peers and feedback, and motivation to remain in treatment.       "

## 2023-09-22 NOTE — GROUP NOTE
"Group Therapy Documentation    PATIENT'S NAME: Alicia Rangel  MRN:   [id number]  :   1995  ACCT. NUMBER: [id number]  DATE OF SERVICE: 23  START TIME: 12:30 PM  END TIME:  2:30 PM  FACILITATOR(S): Myriam Carter LADC  TOPIC: BEH Group Therapy  Number of patients attending the group:  8  Group Length:  2 Hours    Group Therapy Type: Recovery strategies    Summary of Group / Topics Discussed:    Recovery Principles, Balanced lifestyle, and Disease of addiction    Patients viewed \"The Way Back.\" This film addressed: addiction as a disease, early abstinence from substances and living a life in recovery. Patients engaged in a thorough discussion about the film, and shared personal experiences, and how the film helped them process their own life events. Each patient had an opportunity to process, ask questions of the facilitator, and provide constructive feedback to peers who shared.      Group Attendance:  Attended group session    Patient's response to the group topic/interactions:  cooperative with task    Patient appeared to be Actively participating, Attentive, and Engaged.        Client specific details:  Patient fully engaged in group process, providing appropriate feedback, and sharing personal insights on the film's theme of addiction and recovery.    "

## 2023-09-23 ENCOUNTER — HOSPITAL ENCOUNTER (OUTPATIENT)
Dept: BEHAVIORAL HEALTH | Facility: CLINIC | Age: 28
Discharge: HOME | End: 2023-09-23
Attending: FAMILY MEDICINE
Payer: COMMERCIAL

## 2023-09-23 PROCEDURE — H2035 A/D TX PROGRAM, PER HOUR: HCPCS | Mod: HQ

## 2023-09-23 PROCEDURE — 1002N00001 HC LODGING PLUS FACILITY CHARGE ADULT

## 2023-09-23 NOTE — GROUP NOTE
Psychoeducation Group Documentation    PATIENT'S NAME: Alicia Rangel  MRN:   [id number]  :   1995  ACCT. NUMBER: [id number]  DATE OF SERVICE: 23  START TIME: 12:30 PM  END TIME:  2:30 PM  FACILITATOR(S): Keila Stanford LADC; Zeus Jameson LADC; Angeles Arthur LADC  TOPIC: BEH Pyschoeducation  Number of patients attending the group:  29  Group Length:  2 Hours    Skills Group Therapy Type: Recovery skills    Summary of Group / Topics Discussed:    Relapse prevention skills    Facilitator: ERROL Shepard gave lecture on Communication Styles/Skills and the importance of them in connecting with recovery support network.   Identified communication styles, benefits/pitfalls of each style, how the styles are used in every day life in recovery with support network.   Patients engaged in group activity emphasizing communication, sharing skills and information for a common goal.   Facilitator shared the similarities between the activity and recovery, and asserting ones needs is the hearty route in seeking a stable recovery.         Group Attendance:  Attended group session    Patient's response to the group topic/interactions:  cooperative with task    Patient appeared to be Actively participating.         Client specific details:  Alicia attended and remained engaged in relapse prevention workshop lecture.

## 2023-09-23 NOTE — GROUP NOTE
Psychoeducation Group Documentation    PATIENT'S NAME: Alicia Rangel  MRN:   [id number]  :   1995  ACCT. NUMBER: [id number]  DATE OF SERVICE: 23  START TIME:  9:00 AM  END TIME: 11:00 AM  FACILITATOR(S): Zeus Jameson LADC; Keila Stanford LADC; Angeles Arthur LADC  TOPIC: BEH Pyschoeducation  Number of patients attending the group:  30  Group Length:  2 Hours    Skills Group Therapy Type: Recovery skills    Summary of Group / Topics Discussed:    Relapse prevention skills        Group Attendance:  Attended    Patient's response to the group topic/interactions:  cooperative with task and listened actively    Patient appeared to be Attentive.         Client specific details:  Alicia gave appropriate feedback. .

## 2023-09-24 ENCOUNTER — HOSPITAL ENCOUNTER (OUTPATIENT)
Dept: BEHAVIORAL HEALTH | Facility: CLINIC | Age: 28
Discharge: HOME | End: 2023-09-24
Attending: FAMILY MEDICINE
Payer: COMMERCIAL

## 2023-09-24 LAB
FENTANYL UR-MCNC: 1.4 NG/ML
NORFENTANYL UR-MCNC: 9.5 NG/ML

## 2023-09-24 PROCEDURE — H2035 A/D TX PROGRAM, PER HOUR: HCPCS | Mod: HQ

## 2023-09-24 NOTE — PROGRESS NOTES
"At approx 14:15 pt approached writer and stated, \"Rosi told me I have to talk to you, I think I'm going to leave.\" Pt offered several reasons for this decision: she owes rent, she wants to go back to work, she's detoxed from Fentanyl and is on suboxone, IP tx isn't a requirement of her probation, and she's distracted by her outside concerns. She reported that she has a safe place to go, that she plans to follow up with Sachin Clements and her PCP for her suboxone, that she plans to resume OP at [employer] ([facility] signed), and that she plans to attend 12-step meetings. Writer expressed concern but affirmed pt's ability to make her own decisions, and encouraged her to reach out if she needs help. Writer informed coordinator that pt will be discharging.   "

## 2023-09-24 NOTE — GROUP NOTE
Psychoeducation Group Documentation    PATIENT'S NAME: Alicia Rangel  MRN:   [id number]  :   1995  ACCT. NUMBER: [id number]  DATE OF SERVICE: 23  START TIME: 12:30 PM  END TIME:  1:30 PM  FACILITATOR(S): Angeles Arthur LADC; Zeus Jameson LADC  TOPIC: BEH Pyschoeducation  Number of patients attending the group:  29  Group Length:  1 Hours    Skills Group Therapy Type: Recovery skills    Summary of Group / Topics Discussed:    Relationship/social skills and Relapse prevention skills    Group Attendance:  Attended group session    Patient's response to the group topic/interactions:  cooperative with task    Patient appeared to be Attentive and Engaged.         Client specific details: Pt listened attentively to a presentation on sexual boundaries and asked appropriate questions.

## 2023-09-24 NOTE — GROUP NOTE
Psychoeducation Group Documentation    PATIENT'S NAME: Alicia Rangel  MRN:   [id number]  :   1995  ACCT. NUMBER: [id number]  DATE OF SERVICE: 23  START TIME:  8:40 AM  END TIME: 10:30 AM  FACILITATOR(S): Zeus Jameson LADC; Angeles Arthur LADC; Rosi Hoskins RN  TOPIC: BEH Pyschoeducation  Number of patients attending the group:  30  Group Length:  2 Hours    Skills Group Therapy Type: Healthy behaviors development    Summary of Group / Topics Discussed:    Balanced lifestyle skills        Group Attendance:  Attended group session    Patient's response to the group topic/interactions:  cooperative with task and listened actively    Patient appeared to be Actively participating and Attentive.         Client specific details:  Alicia gave appropriate feedback. .

## 2023-09-25 NOTE — PROGRESS NOTES
"CHEMICAL DEPENDENCY DISCHARGE SUMMARY   NAME: Alicia Rangel  : 1995  MR # [id number]    EVALUATION COUNSELOR: ERROL Solomon   TREATMENT COUNSELOR:  ERROL Lakhani, ERROL Shepard    REFERRAL SOURCE:  Self  PROGRAM:  [facility]    ADMISSION DATE:  23  LAST SESSION DATE: 23  DISCHARGE DATE: 23    ADMISSION DIAGNOSIS:   F15.20 Stimulant Use Disorder, Severe   F11.20 Opioid Use Disorder, Moderate    DISCHARGE DIAGNOSIS:   F15.20 Stimulant Use Disorder, Severe   F11.20 Opioid Use Disorder, Moderate    DISCHARGE STATUS: Patient chose to discharge against staff advice.    LAST USE DATE:  23  DAYS OF TREATMENT COMPLETED:  7     PRESENTING INFORMATION:   From EHR: Patient was a direct transfer from [place] [facility] for Stimulant and Opioid Use.    Patient did not attend programing other than orientation, one six groups, and two lectures and left within 7 days. Therefore a treatment plan was not completed as patient was not here long enough to reassess risk ratings of dimensions. Patient left against staff advice due to \"she owes rent, she wants to go back to work, she's detoxed from Fentanyl and is on suboxone, IP tx isn't a requirement of her probation, and she's distracted by her outside concerns.\" Patient will be allowed to return to  in 30 days if needed.    3. Risk ratings [facility] CD [facility] are as follows:    D1: 0  D2: 2  D3: 2  D4: 3  D5: 4  D6: 4    This information has been disclosed to you from records protected by Federal confidentiality rules (42 CFR part 2). The Federal rules prohibit you from making any further disclosure of this information unless further disclosure is expressly permitted by the written consent of the person to whom it pertains or as otherwise permitted by 42 CFR part 2. A general authorization for the release of medical or other information is NOT " sufficient for this purpose. The Federal rules restrict any use of the information to criminally investigate or prosecute any alcohol or drug abuse patient.

## 2024-07-06 ENCOUNTER — HEALTH MAINTENANCE LETTER (OUTPATIENT)
Age: 29
End: 2024-07-06

## 2024-08-19 NOTE — PLAN OF CARE
Work Completed:  Attended Team Meeting: Reviewed chart notes: Spoke with [facility] to check on patient's status regarding the wait list for CARE Program.     Discharge plan or goal:  CARE Program: Patient has been referred to multiple CARE facilities.                 Barriers to discharge:  Long wait list at CARE Programs:  Patient is continuing to stabilize.    no

## 2025-07-13 ENCOUNTER — HEALTH MAINTENANCE LETTER (OUTPATIENT)
Age: 30
End: 2025-07-13